# Patient Record
Sex: MALE | Race: WHITE | NOT HISPANIC OR LATINO | Employment: OTHER | URBAN - METROPOLITAN AREA
[De-identification: names, ages, dates, MRNs, and addresses within clinical notes are randomized per-mention and may not be internally consistent; named-entity substitution may affect disease eponyms.]

---

## 2024-01-21 DIAGNOSIS — G40.909 EPILEPSY, UNSPECIFIED, NOT INTRACTABLE, WITHOUT STATUS EPILEPTICUS (MULTI): ICD-10-CM

## 2024-01-22 NOTE — TELEPHONE ENCOUNTER
Pt's listed number not answered, VMB full.  I then called wife:    1) needs FU visit with Kathia for Keppra refill    2) was supposed to be seen by Kathia summer 2023 after seizures (inpatient) , pt had no insurance    3) Pt now has Medicare    4) I explained this to wife.  Note given to Cathy to schedule FU with wife for pt at [phone]

## 2024-01-24 ENCOUNTER — APPOINTMENT (OUTPATIENT)
Dept: NEUROLOGY | Facility: HOSPITAL | Age: 64
End: 2024-01-24

## 2024-01-24 RX ORDER — LEVETIRACETAM 500 MG/1
500 TABLET ORAL 2 TIMES DAILY
Qty: 60 TABLET | Refills: 2 | OUTPATIENT
Start: 2024-01-24

## 2024-02-12 ENCOUNTER — APPOINTMENT (OUTPATIENT)
Dept: NEUROLOGY | Facility: HOSPITAL | Age: 64
End: 2024-02-12

## 2024-02-12 PROBLEM — R40.4 EPISODE OF ALTERED CONSCIOUSNESS: Status: ACTIVE | Noted: 2024-02-12

## 2024-02-13 NOTE — PROGRESS NOTES
"SUBJECTIVE    Ellis Pollack is a 63 y.o. left-handed male who presents with   Chief Complaint   Patient presents with    Follow-up     Seizures-patient states he has not had one in a long time        Presents for follow up visit  Visit type: in-clinic visit    HISTORY OF PRESENT ILLNESS    RECAP:  Pt was admitted to [facility] from 7/6/23 -7/11/23 for episode of unresponsiveness. Seen inpatient by Dr. Cornell and myself.     PMH: none known     Presented for: per report patient was found in the neighbor’s yard unresponsive. EMS was contacted, attempted to give narcan however this was unsuccessful to improve his status. There was no reported seizure like activity at that time and patient was brought to the ED. Wife was at work and not present at time of event. However she did report that over the past several years he has been having episodes where when he wakes up he is talking with \"mixed up\" words and not making sense, and stumbling around and falling. States she has attempted to get him evaluated for this however patient has declined evaluation previously. She does not know of any prior seizures. Of note patient was seen here in the ED day prior for abdominal pain and found to have cholelithiasis without cholecystitis or biliary distention, and nonobstructing 9mm stone in left kidney without hydronephrosis. He was discharged with tramadol for pain control.      Testing showed:  HCT and CT c-spine without any acute findings  MRI brain wwo contrast - limited due to motion, no definitive acute findings  EEG with diffuse slowing consistent with moderate diffuse encephalopathy     Plan established including:  No driving until cleared by neurology.   Started on Keppra 500 mg BID    02/14/24 -     Pt was >20 min late for appt on 2/11/24 - rescheduled.     Presents alone for today's in-clinic visit. States no more episodes since discharge. Still taking 500 mg BID of Keppra. States did not continue the seroquel dosing. " "Does not think he needs it, states \" I dont want to quiet them. If they are there then keep them.\" Then states \"Cant understand sticking to another language to confuse everyone.\" Unable to elaborate on who the \"them\" is. ? Hearing voices.     ETOH occasionally. Not since \"a little\" at [facility] - states sips of a drink.     Pt states similar episode happened 4-5 years ago also. States it was in the summer and he was working outside helping other people out. Had period of loss of awareness. States he does not know what happened but his josé luis told him that he had a seizure then. Thinks this happened again (in July) due to working outside and being stressed out at home with family needing a lot of things. Had to fix sons car and do a lot of work outside for his wife. States \"I am always taking on other people's stress\"    Denies any issues with the Keppra. States no dizziness or drowsiness. Taking twice daily. Denies missing any doses.     REVIEW OF SYSTEMS:  10 point review of systems performed and is negative except as noted in the HPI.     History reviewed. No pertinent past medical history.    No relevant family history has been documented for this patient.    History reviewed. No pertinent surgical history.    Social History     Substance and Sexual Activity   Drug Use Never     Tobacco Use: Medium Risk (2/14/2024)    Patient History     Smoking Tobacco Use: Former     Smokeless Tobacco Use: Never     Passive Exposure: Not on file     Alcohol Use: Not on file       Current Outpatient Medications   Medication Instructions    levETIRAcetam (KEPPRA) 500 mg, oral, 2 times daily         OBJECTIVE    BP (!) 140/95   Pulse 89 Comment: Co 99%  Ht 1.702 m (5' 7\")   Wt 94.5 kg (208 lb 4.8 oz)   BMI 32.62 kg/m²       Physical Exam  Vitals reviewed.   Constitutional:       Appearance: He is not ill-appearing or toxic-appearing.   Neurological:      Mental Status: He is alert.      Cranial Nerves: No dysarthria.      " Motor: Motor strength is normal.  Psychiatric:         Attention and Perception: Attention and perception normal.         Mood and Affect: Affect is blunt.         Speech: Speech is tangential.      Comments: Disorganized thoughts  Flight of ideas  Jumping between multiple stories through discussion       Neurological Exam  Mental Status  Alert. Oriented to person, place and time. no dysarthria present.    Cranial Nerves  CN II-XII grossly intact.    Motor  Normal muscle bulk throughout. No fasciculations present. Normal muscle tone. No abnormal involuntary movements. Strength is 5/5 throughout all four extremities.    Sensory  Light touch is normal in upper and lower extremities.     Gait  Casual gait is normal including stance, stride, and arm swing.        MR brain w and wo IV contrast 07/08/2023    Narrative  Interpreted By:  JEANNINE BRANNON MD  MRN: [id number]  Patient Name: FELICIA MCLEAN    STUDY:  MRI BRAIN W/WO CONTRAST;  7/8/2023 5:01 pm    INDICATION:  New onset seizure .    COMPARISON:  CT head dated 07/06/2023.    ACCESSION NUMBER(S):  [id number]    ORDERING CLINICIAN:  MACHELLE SAEED    TECHNIQUE:  Multiplanar and multisequence MRI of the brain was performed before  and after intravenous administration of 14 mL of Dotarem intravenous  gadolinium contrast. Please note that the patient was unable to  tolerate the entire exam, and volumetric postcontrast T1 weighted  images were not obtained, only spin echo T1 weighted postcontrast  images present on current study.    FINDINGS:  Exam is somewhat degraded by motion.    Within limits of the study, no diffusion restriction abnormality is  present to suggest an acute infarct.    No definite evidence of intracranial hemorrhage is identified. There  is no mass effect or midline shift.    Several punctate foci of nonspecific FLAIR signal are present in the  subcortical white matter bilateral cerebral hemispheres, otherwise no  significant parenchymal signal  abnormality is identified. No  pathologic enhancement is identified on spin echo T1 weighted images.    No ventricular dilatation is present. Basal cisterns are patent. No  abnormal extra-axial fluid collections are identified.    Small amount of mucus/secretions are present in the inferior  maxillary sinus. Mastoid air cells are unremarkable in appearance.    Impression  1.  Limited examination due to motion and patient's inability to  tolerate the entire study, with volumetric T1 postcontrast images not  obtained.  2. Within limits of the study, no evidence of diffusion restriction  abnormality to suggest a new infarct, or other acute intracranial  abnormality.        Lab Results   Component Value Date    WBC 11.0 07/10/2023    RBC 4.95 07/10/2023    HGB 15.8 07/10/2023    HCT 45.0 07/10/2023     07/10/2023     07/10/2023    K 3.9 07/10/2023     07/10/2023    BUN 16 07/10/2023    CREATININE 0.73 07/10/2023    CALCIUM 9.0 07/10/2023    ALKPHOS 45 07/07/2023    AST 24 07/07/2023    ALT 13 07/07/2023    MG 1.81 07/09/2023    [id number] 237 07/11/2023    HGBA1C 5.4 11/22/2021    TSH 1.88 07/10/2023              ASSESSMENT & PLAN  Problem List Items Addressed This Visit       Episode of altered consciousness - Primary    Overview     Suspected seizure  Hospitalized July 2023, was newly on tramadol at the time which could have lowered seizure threshold. No witnesses, found unresponsive outside.   Difficult historian, flight of ideas  Tolerating Keppra well, 500 mg BID  Possible reported seizure episode 4-5 years ago also  Both episodes occurred with reported increased stress and working outside for long periods         Current Assessment & Plan     Continue Keppra 500 mg BID  Rx sent for 1 year         Relevant Medications    levETIRAcetam (Keppra) 500 mg tablet    Other Relevant Orders    Follow Up In Neurology    Seizure-like activity (CMS/HCC)    Relevant Orders    Follow Up In Neurology         FU  in 1 year

## 2024-02-14 ENCOUNTER — OFFICE VISIT (OUTPATIENT)
Dept: NEUROLOGY | Facility: HOSPITAL | Age: 64
End: 2024-02-14
Payer: MEDICARE

## 2024-02-14 VITALS
WEIGHT: 208.3 LBS | BODY MASS INDEX: 32.69 KG/M2 | SYSTOLIC BLOOD PRESSURE: 140 MMHG | HEIGHT: 67 IN | DIASTOLIC BLOOD PRESSURE: 95 MMHG | HEART RATE: 89 BPM

## 2024-02-14 DIAGNOSIS — R40.4 EPISODE OF ALTERED CONSCIOUSNESS: Primary | ICD-10-CM

## 2024-02-14 DIAGNOSIS — R56.9 SEIZURE-LIKE ACTIVITY (MULTI): ICD-10-CM

## 2024-02-14 PROCEDURE — 1036F TOBACCO NON-USER: CPT | Performed by: NURSE PRACTITIONER

## 2024-02-14 PROCEDURE — 1126F AMNT PAIN NOTED NONE PRSNT: CPT | Performed by: NURSE PRACTITIONER

## 2024-02-14 PROCEDURE — 1159F MED LIST DOCD IN RCRD: CPT | Performed by: NURSE PRACTITIONER

## 2024-02-14 PROCEDURE — 99213 OFFICE O/P EST LOW 20 MIN: CPT | Performed by: NURSE PRACTITIONER

## 2024-02-14 PROCEDURE — 1160F RVW MEDS BY RX/DR IN RCRD: CPT | Performed by: NURSE PRACTITIONER

## 2024-02-14 RX ORDER — LEVETIRACETAM 500 MG/1
500 TABLET ORAL 2 TIMES DAILY
Qty: 60 TABLET | Refills: 11 | Status: SHIPPED | OUTPATIENT
Start: 2024-02-14 | End: 2025-02-13

## 2024-02-14 ASSESSMENT — ENCOUNTER SYMPTOMS
OCCASIONAL FEELINGS OF UNSTEADINESS: 0
DEPRESSION: 1
LOSS OF SENSATION IN FEET: 0

## 2024-02-14 ASSESSMENT — PAIN SCALES - GENERAL: PAINLEVEL: 0-NO PAIN

## 2024-02-14 ASSESSMENT — PATIENT HEALTH QUESTIONNAIRE - PHQ9
1. LITTLE INTEREST OR PLEASURE IN DOING THINGS: SEVERAL DAYS
SUM OF ALL RESPONSES TO PHQ9 QUESTIONS 1 AND 2: 2
2. FEELING DOWN, DEPRESSED OR HOPELESS: SEVERAL DAYS
10. IF YOU CHECKED OFF ANY PROBLEMS, HOW DIFFICULT HAVE THESE PROBLEMS MADE IT FOR YOU TO DO YOUR WORK, TAKE CARE OF THINGS AT HOME, OR GET ALONG WITH OTHER PEOPLE: NOT DIFFICULT AT ALL

## 2024-02-14 NOTE — PATIENT INSTRUCTIONS
Seizures:  As we discussed, because you have not had any events or seizures where you lose awareness or control of your actions, you have no restrictions at this time. However, if you have an event of seizure where this were to occur, you must inform our office, and we will reassess things. In this event, you stop driving, using heavy machinery, climbing heights, or engaging in any other activity that would cause harm/death to yourself or others were you to lose awareness/consciousness and have a seizure. These restrictions would need to stay in place until at least 6 months of seizure freedom and clearance from your physician. Avoid possible triggers for seizure activity including alcohol or drug use, stress, not eating, and sleep deprivation.    Guidelines on when to treat a first seizure found that starting seizure medications after the first seizure can lessen the risk for more seizures in the first 2 years. The report also found:  When an adult has an unprovoked seizure, the risk for more seizures without treatment in the next 2 years can range 21 to 45%. This risk is greatest in people who have had a brain injury (such as stroke or trauma) and seizure activity is seen on the EEG. Other factors that may lead to a higher risk for seizures in the first 2 years are other abnormalities on brain imaging tests and seizures at night.  Starting a seizure medication right away may help lessen this early risk, but does not seem to affect the long-term risk of seizures. The risk of side effects from seizure medications may range from 7 to 31%. Medication side effects are usually mild and go away. Early treatment may not affect the person's quality of life over time.    Please be sure your family and friends know how to manage any future seizures you may have. This includes staying calm, turning you on your side and removing potential close hazards, cushion your head if able, remove any glasses, do NOT put anything in your  mouth, observe and monitor duration. It is normal to be tired and disoriented in your post-ictal (after seizure) phase. This can last several minutes up to hours. You may develop a headache during this time.     Call us to notify us if you have any seizure activity.   When to call 911: if you are injured during the seizure (including hitting your head), if the seizure lasts longer than 5-10 minutes, if your face/lips turn blue, if you do not start to wake up after about 30 mins following the seizure, or if it is not your typical seizure activity.     Additional helpful resources are available at:  [employer] - www.epilepsy.com  [facility] [place] [facility] - www.[facility].org

## 2024-02-22 ENCOUNTER — HOSPITAL ENCOUNTER (EMERGENCY)
Facility: HOSPITAL | Age: 64
Discharge: HOME | End: 2024-02-22
Attending: EMERGENCY MEDICINE
Payer: MEDICARE

## 2024-02-22 VITALS
WEIGHT: 142 LBS | DIASTOLIC BLOOD PRESSURE: 76 MMHG | HEART RATE: 66 BPM | BODY MASS INDEX: 22.29 KG/M2 | HEIGHT: 67 IN | OXYGEN SATURATION: 98 % | TEMPERATURE: 98.1 F | SYSTOLIC BLOOD PRESSURE: 124 MMHG | RESPIRATION RATE: 16 BRPM

## 2024-02-22 DIAGNOSIS — M79.5 FOREIGN BODY (FB) IN SOFT TISSUE: Primary | ICD-10-CM

## 2024-02-22 PROCEDURE — 90715 TDAP VACCINE 7 YRS/> IM: CPT | Performed by: EMERGENCY MEDICINE

## 2024-02-22 PROCEDURE — 99283 EMERGENCY DEPT VISIT LOW MDM: CPT | Mod: 25

## 2024-02-22 PROCEDURE — 10120 INC&RMVL FB SUBQ TISS SMPL: CPT | Performed by: EMERGENCY MEDICINE

## 2024-02-22 PROCEDURE — 2500000005 HC RX 250 GENERAL PHARMACY W/O HCPCS: Performed by: EMERGENCY MEDICINE

## 2024-02-22 PROCEDURE — 90471 IMMUNIZATION ADMIN: CPT | Performed by: EMERGENCY MEDICINE

## 2024-02-22 PROCEDURE — 2500000004 HC RX 250 GENERAL PHARMACY W/ HCPCS (ALT 636 FOR OP/ED): Performed by: EMERGENCY MEDICINE

## 2024-02-22 RX ORDER — LIDOCAINE HYDROCHLORIDE AND EPINEPHRINE 10; 10 MG/ML; UG/ML
5 INJECTION, SOLUTION INFILTRATION; PERINEURAL ONCE
Status: COMPLETED | OUTPATIENT
Start: 2024-02-22 | End: 2024-02-22

## 2024-02-22 RX ADMIN — TETANUS TOXOID, REDUCED DIPHTHERIA TOXOID AND ACELLULAR PERTUSSIS VACCINE, ADSORBED 0.5 ML: 5; 2.5; 8; 8; 2.5 SUSPENSION INTRAMUSCULAR at 18:18

## 2024-02-22 RX ADMIN — LIDOCAINE HYDROCHLORIDE,EPINEPHRINE BITARTRATE 5 ML: 10; .01 INJECTION, SOLUTION INFILTRATION; PERINEURAL at 18:23

## 2024-02-22 ASSESSMENT — PAIN - FUNCTIONAL ASSESSMENT: PAIN_FUNCTIONAL_ASSESSMENT: 0-10

## 2024-02-22 ASSESSMENT — PAIN SCALES - GENERAL
PAINLEVEL_OUTOF10: 0 - NO PAIN
PAINLEVEL_OUTOF10: 0 - NO PAIN

## 2024-02-22 NOTE — ED PROVIDER NOTES
HPI   Chief Complaint   Patient presents with    shaving of metal in abdomen        HPI     HISTORY OF PRESENT ILLNESS:  Patient is a 63-year-old male presents to the emergency department for foreign body.  Patient states that 2 months ago, he was changing the brakes on his car.  There was some flecks of metal that had went into his pants.  It hit his abdominal wall.  He noted today that there was a piece that he was trying to get out.  There is no pain or discharge.  Does not know when his last tetanus shot was.    Past Medical History: Seizures on Keppra, suspected dementia      __________________________________________________________  PHYSICAL EXAM:    Appearance: Alert, oriented , cooperative   Skin: Intact,  dry skin, no lesions, rash, petechiae or purpura.   Eyes: PERRLA, EOMs intact,  Conjunctiva pink with no redness or exudates.    HENT: Normocephalic, atraumatic. Nares patent   Neck: Supple. Trachea at midline.   Pulmonary: Lung sounds are clear bilaterally.  There is no rales, rhonchi, or wheezing.  Cardiac: Regular rate and rhythm, no rubs, murmurs, or gallops. No JVD,   Abdomen: Abdomen is soft, nontender, and nondistended.  No palpable organomegaly.  No rebound or guarding.  No CVA tenderness. Nonsurgical abdomen  Genitourinary: Exam deferred.  Musculoskeletal: no edema, pain, cyanosis, or deformity in extremities. Pulses full and equal.   Neurological:  Cranial nerves are grossly intact, grossly normal sensation, no weakness, no focal findings identified.    __________________________________________________________  MEDICAL DECISION MAKING:    Patient was seen and examined.  Patient had what appeared to be a foreign body in the lower abdominal region.  Patient believes that this could have been when he was replacing the brakes.  The area did not have any area of fluctuance.  I updated the patient's tetanus as he did not know when his last tetanus was.  Then removed the object after injecting the  patient with lidocaine.  This was removed with forceps.  Patient was given return precautions.  Patient was given supportive care instructions including Neosporin and return precautions for cellulitis.  Patient was discharged.        Chronic Medical Conditions Significantly Affecting Care:    External Records Reviewed: I reviewed recent and relevant outside records including: Patient discharge summary from July 11, 2023    Everette Garcia  Emergency Medicine               [place] Coma Scale Score: 15                     Patient History   No past medical history on file.  No past surgical history on file.  No family history on file.  Social History     Tobacco Use    Smoking status: Former     Types: Cigarettes    Smokeless tobacco: Never   Substance Use Topics    Alcohol use: Never    Drug use: Never       Physical Exam   ED Triage Vitals [02/22/24 1752]   Temperature Heart Rate Respirations BP   36.7 °C (98.1 °F) 66 16 124/76      Pulse Ox Temp Source Heart Rate Source Patient Position   98 % Temporal Monitor Sitting      BP Location FiO2 (%)     Left arm --       Physical Exam    ED Course & MDM   Diagnoses as of 02/22/24 1849   Foreign body (FB) in soft tissue       Medical Decision Making      Procedure  Foreign Body Removal - Embedded    Performed by: Everette Garcia DO  Authorized by: Everette Garcia DO    Consent:     Consent obtained:  Written and verbal    Consent given by:  Patient    Risks discussed:  Bleeding, infection, pain and worsening of condition    Alternatives discussed:  No treatment and observation  Universal protocol:     Patient identity confirmed:  Verbally with patient, arm band and provided demographic data  Location:     Location: lower abdominaln wall.    Depth:  Subcutaneous  Pre-procedure details:     Neurovascular status: intact    Anesthesia:     Anesthesia method:  Local infiltration    Local anesthetic:  Lidocaine 1% WITH epi  Procedure type:     Procedure complexity:   Simple  Procedure details:     Dissection of underlying tissues: no      Bloodless field: yes      Removal mechanism:  Forceps    Foreign bodies recovered:  1    Description:  Black, soft tissue, 2mm in lenth.  Post-procedure details:     Neurovascular status: intact      Confirmation:  No additional foreign bodies on visualization    Skin closure:  None    Dressing:  Non-adherent dressing    Procedure completion:  Tolerated       Everette Garcia DO  02/22/24 5539

## 2024-03-04 ENCOUNTER — APPOINTMENT (OUTPATIENT)
Dept: NEUROLOGY | Facility: HOSPITAL | Age: 64
End: 2024-03-04

## 2024-04-09 ENCOUNTER — APPOINTMENT (OUTPATIENT)
Dept: RADIOLOGY | Facility: HOSPITAL | Age: 64
End: 2024-04-09
Payer: MEDICARE

## 2024-04-09 ENCOUNTER — HOSPITAL ENCOUNTER (EMERGENCY)
Facility: HOSPITAL | Age: 64
Discharge: HOME | End: 2024-04-09
Payer: MEDICARE

## 2024-04-09 VITALS
BODY MASS INDEX: 21.19 KG/M2 | DIASTOLIC BLOOD PRESSURE: 68 MMHG | HEART RATE: 65 BPM | HEIGHT: 67 IN | OXYGEN SATURATION: 98 % | RESPIRATION RATE: 16 BRPM | TEMPERATURE: 98.5 F | WEIGHT: 135 LBS | SYSTOLIC BLOOD PRESSURE: 109 MMHG

## 2024-04-09 DIAGNOSIS — S30.851A FOREIGN BODY IN SKIN OF ABDOMEN: Primary | ICD-10-CM

## 2024-04-09 PROCEDURE — 99283 EMERGENCY DEPT VISIT LOW MDM: CPT

## 2024-04-09 PROCEDURE — 74018 RADEX ABDOMEN 1 VIEW: CPT

## 2024-04-09 PROCEDURE — 74018 RADEX ABDOMEN 1 VIEW: CPT | Performed by: RADIOLOGY

## 2024-04-09 PROCEDURE — 2500000001 HC RX 250 WO HCPCS SELF ADMINISTERED DRUGS (ALT 637 FOR MEDICARE OP)

## 2024-04-09 PROCEDURE — 2500000001 HC RX 250 WO HCPCS SELF ADMINISTERED DRUGS (ALT 637 FOR MEDICARE OP): Performed by: NURSE PRACTITIONER

## 2024-04-09 RX ORDER — BACITRACIN ZINC 500 UNIT/G
OINTMENT IN PACKET (EA) TOPICAL ONCE
Status: COMPLETED | OUTPATIENT
Start: 2024-04-09 | End: 2024-04-09

## 2024-04-09 RX ORDER — BACITRACIN ZINC 500 UNIT/G
OINTMENT IN PACKET (EA) TOPICAL
Status: COMPLETED
Start: 2024-04-09 | End: 2024-04-09

## 2024-04-09 RX ADMIN — BACITRACIN 1 APPLICATION: 500 OINTMENT TOPICAL at 14:39

## 2024-04-09 RX ADMIN — BACITRACIN 1 APPLICATION: 500 OINTMENT TOPICAL at 16:35

## 2024-04-09 ASSESSMENT — PAIN SCALES - GENERAL: PAINLEVEL_OUTOF10: 0 - NO PAIN

## 2024-04-09 ASSESSMENT — PAIN DESCRIPTION - LOCATION: LOCATION: ABDOMEN

## 2024-04-09 ASSESSMENT — PAIN DESCRIPTION - ORIENTATION: ORIENTATION: LOWER

## 2024-04-09 ASSESSMENT — LIFESTYLE VARIABLES
EVER HAD A DRINK FIRST THING IN THE MORNING TO STEADY YOUR NERVES TO GET RID OF A HANGOVER: NO
EVER FELT BAD OR GUILTY ABOUT YOUR DRINKING: NO
TOTAL SCORE: 0
HAVE PEOPLE ANNOYED YOU BY CRITICIZING YOUR DRINKING: NO
HAVE YOU EVER FELT YOU SHOULD CUT DOWN ON YOUR DRINKING: NO

## 2024-04-09 ASSESSMENT — PAIN - FUNCTIONAL ASSESSMENT: PAIN_FUNCTIONAL_ASSESSMENT: 0-10

## 2024-04-09 NOTE — DISCHARGE INSTRUCTIONS
WOUND CARE PATIENT INSTRUCTIONS:    GENERAL INFORMATION:   A wound is a break in the skin.  There are several types of wounds.  Abrasions occur when the outer layer of the skin is rubbed or scraped off.  Lacerations are cuts in the skin.  Puncture wounds are holes that are made by round, sharp objects such as needles or nails.  It may have been necessary to close the wound with stitches (sutures) to speed healing and to prevent infection.  Using stitches also will decrease the amount of scarring.    INSTRUCTIONS:   1.  Wash the area with soap and water twice daily, pat it dry.    2.  Apply antibiotic ointment to the area twice daily after cleansing.    3.  Cover with a Band-Aid or sterile dressing.      CONTACT YOUR DOCTOR OR GOTO THE EMERGENCY DEPARTMENT IF:  1. You have a temperature over 100.4 F (38 C).    2. You have signs of infection such as increasing pain or soreness, swelling, redness, pus, a foul smell, or red   streaks coming from the injured site.    3. You have numbness or swelling below the wound, or you can't move the joint below.     INFORMATION FROM UP TO DATE - ALL RIGHTS RESERVED

## 2024-04-09 NOTE — ED PROVIDER NOTES
"    HPI   Chief Complaint   Patient presents with    possible fb lower abdominal wall       Patient presents the emergency department for concern of a foreign body in his superficial skin of the left lower abdominal wall.  He states months ago he was working on brakes and felt that he got a hortencia of metal in his skin.  He was evaluated at the end of February and had a small piece of metal removed from the skin.  Since then, a few days ago he noticed suspicion of a metal foreign body again.  He has had no redness, swelling, abdominal pain, nausea, vomiting, fever, chills, myalgias or malaise.  He has been eating, drinking, defecating and urinating as per normal.  He reports his tetanus vaccine to be up-to-date.  He wants to see if there is still a piece of metal left over after he had it taken out.      History provided by:  Patient   used: No                          [place] Coma Scale Score: 15                  Patient History   No past medical history on file.  No past surgical history on file.  No family history on file.  Social History     Tobacco Use    Smoking status: Former     Types: Cigarettes    Smokeless tobacco: Never   Substance Use Topics    Alcohol use: Never    Drug use: Never       Physical Exam   Visit Vitals  /68 (BP Location: Left arm, Patient Position: Sitting)   Pulse 65   Temp 36.9 °C (98.5 °F) (Skin)   Resp 16   Ht 1.702 m (5' 7\")   Wt 61.2 kg (135 lb)   SpO2 98%   BMI 21.14 kg/m²   Smoking Status Former   BSA 1.7 m²      Physical Exam  Vitals and nursing note reviewed.   Constitutional:       General: He is not in acute distress.     Appearance: He is well-developed.   HENT:      Head: Normocephalic and atraumatic.      Mouth/Throat:      Lips: Pink.      Mouth: Mucous membranes are moist.   Eyes:      Conjunctiva/sclera: Conjunctivae normal.   Cardiovascular:      Rate and Rhythm: Normal rate and regular rhythm.      Pulses:           Radial pulses are 2+ on the " left side.   Pulmonary:      Effort: Pulmonary effort is normal.      Breath sounds: Normal breath sounds.   Abdominal:      General: Bowel sounds are normal.      Palpations: Abdomen is soft.      Tenderness: There is no abdominal tenderness.       Genitourinary:     Comments: deferred  Musculoskeletal:         General: No swelling.      Cervical back: Full passive range of motion without pain and neck supple.      Comments: PALACIOS katty   Skin:     General: Skin is warm and dry.      Capillary Refill: Capillary refill takes less than 2 seconds.      Findings: Lesion (suspicious for foreign body as documented under abdomem) present.   Neurological:      General: No focal deficit present.      Mental Status: He is alert and oriented to person, place, and time.   Psychiatric:         Mood and Affect: Mood normal.         Behavior: Behavior is cooperative.         XR abdomen 1 view   Final Result   No radiopaque foreign body is noted.        MACRO:   None        Signed by: Rajinder Luke 4/9/2024 3:08 PM   Dictation workstation:   [id number]          Labs Reviewed - No data to display      ED Course & [facility]   ED Course as of 04/09/24 1627   Tue Apr 09, 2024 1624 Patient continues to feel comfortable sitting in a recliner.  We discussed his x-ray result as well as skin care at home.  He states he has an antibiotic cream that he can use and declines offer for prescription. [NA]      ED Course User Index  [NA] Debbie Calles, APRN-CNP         Diagnoses as of 04/09/24 1627   Foreign body in skin of abdomen           Medical Decision Making  Patient returns to the emergency department for recheck of suspicion foreign body to the superficial skin of the abdominal wall.  He had a potential metal foreign body from doing brakes in April and believes one resurfaced or it did not completely come out.  It is quite superficial and patient did not require patient provides consent for removal without anesthesia.  Upon exam it is  difficult to ascertain if it is a piece of retained metal or a thick hair.  Will perform a KUB in which the technologist at the bedside was asked to perform lateral view to check for foreign body of the superficial skin.  Patient declines need for dose of Tylenol and he has no clinical evidence of acute abdomen or suspicion of sepsis as he is afebrile, no tachycardia, normotensive and with abdomen soft, nontender with active bowel sounds and no rigidity.     X-ray as interpreted by radiologist negative for radiopaque foreign body.  I reviewed the films as well and I do not see a foreign body in the area as well.    Plan is for skin care with daily shower with soap and water, over-the-counter topical antibiotic cream and outpatient follow-up with primary care for a wound recheck as needed. Patient is non-toxic, not hypoxic and appropriate for this outpatient management plan which they prefer. Written handout of discharge instructions provided. Patient was educated on signs of symptoms to watch for indicative of re-evaluation in the emergency department setting to include any worsening of current symptoms. They verbalized understanding of instructions and is amenable to this treatment plan with no social determinants of health that would obscure this plan. Patient departed in stable condition.              Your medication list        ASK your doctor about these medications        Instructions Last Dose Given Next Dose Due   levETIRAcetam 500 mg tablet  Commonly known as: Keppra      Take 1 tablet (500 mg) by mouth 2 times a day.                Procedure  Time: 1430    FOREIGN BODY REMOVAL SKIN    Indication: foreign body skin  Performed By: Debbie Calles CNP  Risks, benefits and alternatives for the applicable procedure described. Opportunity for questions and answers provided to allow for informed decision making.  Consent: Verbal consent was obtained by the patient    Location:   LLQ superficial skin    Prep:  The skin was cleansed with chlorhexidine gluconate  Local Anesthesia: None  Removal: The foreign body was retrieved with 18 g needle and forceps.  It was approximately 2 mm thin linear dark brown/black in color.  Unable to determine if it is consistent with metal or a thick hair.  Complications: There were no complications and patient tolerated the procedure well. Bacitracin band-aid applied.       *This report was transcribed using voice recognition software.  Every effort was made to ensure accuracy; however, inadvertent computerized transcription errors may be present.*  Debbie Calles, MELVIN-AUNDREA  04/09/24         Debbie Calles, MELVIN-AUNDREA  04/09/24 [id number]

## 2025-02-14 ENCOUNTER — APPOINTMENT (OUTPATIENT)
Dept: NEUROLOGY | Facility: HOSPITAL | Age: 65
End: 2025-02-14
Payer: MEDICARE

## 2025-03-06 ENCOUNTER — OFFICE VISIT (OUTPATIENT)
Dept: NEUROLOGY | Facility: HOSPITAL | Age: 65
End: 2025-03-06
Payer: MEDICARE

## 2025-03-06 VITALS
BODY MASS INDEX: 23.63 KG/M2 | DIASTOLIC BLOOD PRESSURE: 95 MMHG | WEIGHT: 150.9 LBS | SYSTOLIC BLOOD PRESSURE: 155 MMHG | HEART RATE: 95 BPM

## 2025-03-06 DIAGNOSIS — R56.9 SEIZURE-LIKE ACTIVITY (MULTI): ICD-10-CM

## 2025-03-06 DIAGNOSIS — R40.4 EPISODE OF ALTERED CONSCIOUSNESS: Primary | ICD-10-CM

## 2025-03-06 PROCEDURE — 1036F TOBACCO NON-USER: CPT | Performed by: NURSE PRACTITIONER

## 2025-03-06 PROCEDURE — 1160F RVW MEDS BY RX/DR IN RCRD: CPT | Performed by: NURSE PRACTITIONER

## 2025-03-06 PROCEDURE — 1159F MED LIST DOCD IN RCRD: CPT | Performed by: NURSE PRACTITIONER

## 2025-03-06 PROCEDURE — 99214 OFFICE O/P EST MOD 30 MIN: CPT | Performed by: NURSE PRACTITIONER

## 2025-03-06 PROCEDURE — 1126F AMNT PAIN NOTED NONE PRSNT: CPT | Performed by: NURSE PRACTITIONER

## 2025-03-06 RX ORDER — LEVETIRACETAM 500 MG/1
500 TABLET ORAL 2 TIMES DAILY
Qty: 60 TABLET | Refills: 11 | Status: SHIPPED | OUTPATIENT
Start: 2025-03-06 | End: 2026-03-06

## 2025-03-06 ASSESSMENT — ENCOUNTER SYMPTOMS
OCCASIONAL FEELINGS OF UNSTEADINESS: 0
LOSS OF SENSATION IN FEET: 0
DEPRESSION: 0

## 2025-03-06 ASSESSMENT — PAIN SCALES - GENERAL: PAINLEVEL_OUTOF10: 0-NO PAIN

## 2025-03-06 NOTE — PROGRESS NOTES
"[facility]    SUBJECTIVE    Ellis Pollack is a 64 y.o. left-handed male who presents with   Chief Complaint   Patient presents with    Seizures     Yearly visit        Presents for follow up visit  Visit type: in-clinic visit    HISTORY OF PRESENT ILLNESS    RECAP:  Initially seen as hospital follow up from admission 7/6/23 -7/11/23 for episode of unresponsiveness. PMH: none known. Per report patient was found in the neighbor’s yard unresponsive. EMS was contacted, attempted to give narcan however this was unsuccessful to improve his status. There was no reported seizure like activity at that time and patient was brought to the ED. Wife was at work and not present at time of event. However she did report that over the past several years he has been having episodes where when he wakes up he is talking with \"mixed up\" words and not making sense, and stumbling around and falling. States she has attempted to get him evaluated for this however patient has declined evaluation previously. She does not know of any prior seizures. Of note patient was seen here in the ED day prior for abdominal pain and found to have cholelithiasis without cholecystitis or biliary distention, and nonobstructing 9mm stone in left kidney without hydronephrosis. He was discharged with tramadol for pain control.      HCT and CT c-spine without any acute findings  MRI brain wwo contrast - limited due to motion, no definitive acute findings  EEG with diffuse slowing consistent with moderate diffuse encephalopathy    No driving until cleared by neurology. Started on Keppra 500 mg BID    Did not follow up until February 2024. Presents alone for today's in-clinic visit. States no more episodes since discharge. Still taking 500 mg BID of Keppra. States did not continue the seroquel dosing. Does not think he needs it, states \" I dont want to quiet them. If they are there then keep them.\" Then states \"Cant understand sticking " "to another language to confuse everyone.\" Unable to elaborate on who the \"them\" is. ? Hearing voices.      ETOH occasionally. Not since \"a little\" at [place] - states sips of a drink.      Pt states similar episode happened 4-5 years ago also. States it was in the summer and he was working outside helping other people out. Had period of loss of awareness. States he does not know what happened but his josé luis told him that he had a seizure then. Thinks this happened again (in July) due to working outside and being stressed out at home with family needing a lot of things. Had to fix sons car and do a lot of work outside for his wife. States \"I am always taking on other people's stress\"     Denies any issues with the Keppra. States no dizziness or drowsiness. Taking twice daily. Denies missing any doses.     03/06/25 - pt presented alone for today's visit.     Patient denies any auras, shaking, jerking, convulsions, loss of time, zoning out, waking up with tongue or cheek bites, incontinence or unexplained bruising.    States he has been not overexerting himself either. Previously was working all day and then going home and doing all of the house work. He states he set boundaries with his family.     REVIEW OF SYSTEMS:  10 point review of systems performed and is negative except as noted in the HPI.     History reviewed. No pertinent past medical history.    No relevant family history has been documented for this patient.    History reviewed. No pertinent surgical history.    Social History     Substance and Sexual Activity   Drug Use Never     Tobacco Use: Medium Risk (3/6/2025)    Patient History     Smoking Tobacco Use: Former     Smokeless Tobacco Use: Never     Passive Exposure: Not on file     Alcohol Use: Not on file       Current Outpatient Medications   Medication Instructions    levETIRAcetam (KEPPRA) 500 mg, oral, 2 times daily         OBJECTIVE    BP (!) 155/95   Pulse 95   Wt 68.4 kg (150 lb 14.4 oz)   " BMI 23.63 kg/m²       Physical Exam  Psychiatric:         Mood and Affect: Affect is flat.         Speech: Speech is rapid and pressured and tangential.       Neurological Exam  Mental Status  Awake, alert and oriented to person, place and time. Language is fluent with no aphasia. Attention and concentration are normal. Fund of knowledge is appropriate for level of education.    Cranial Nerves  CN II-XII grossly intact.    Motor  Normal muscle bulk throughout. No fasciculations present. Normal muscle tone. No abnormal involuntary movements.  Strength at least antigravity throughout    +decreased blink  No tremor.    Sensory  Light touch is normal in upper and lower extremities.     Coordination  Right: Finger-to-nose normal.Left: Finger-to-nose normal.    Gait  Casual gait: Reduced stride length. Shuffling gait.        MR brain w and wo IV contrast 07/08/2023    Narrative  Interpreted By:  JEANNINE BRANNON MD  MRN: [id number]  Patient Name: FELICIA MCLEAN    STUDY:  MRI BRAIN W/WO CONTRAST;  7/8/2023 5:01 pm    INDICATION:  New onset seizure .    COMPARISON:  CT head dated 07/06/2023.    ACCESSION NUMBER(S):  [id number]    ORDERING CLINICIAN:  MACHELLE SAEED    TECHNIQUE:  Multiplanar and multisequence MRI of the brain was performed before  and after intravenous administration of 14 mL of Dotarem intravenous  gadolinium contrast. Please note that the patient was unable to  tolerate the entire exam, and volumetric postcontrast T1 weighted  images were not obtained, only spin echo T1 weighted postcontrast  images present on current study.    FINDINGS:  Exam is somewhat degraded by motion.    Within limits of the study, no diffusion restriction abnormality is  present to suggest an acute infarct.    No definite evidence of intracranial hemorrhage is identified. There  is no mass effect or midline shift.    Several punctate foci of nonspecific FLAIR signal are present in the  subcortical white matter bilateral cerebral  hemispheres, otherwise no  significant parenchymal signal abnormality is identified. No  pathologic enhancement is identified on spin echo T1 weighted images.    No ventricular dilatation is present. Basal cisterns are patent. No  abnormal extra-axial fluid collections are identified.    Small amount of mucus/secretions are present in the inferior  maxillary sinus. Mastoid air cells are unremarkable in appearance.    Impression  1.  Limited examination due to motion and patient's inability to  tolerate the entire study, with volumetric T1 postcontrast images not  obtained.  2. Within limits of the study, no evidence of diffusion restriction  abnormality to suggest a new infarct, or other acute intracranial  abnormality.      Lab Results   Component Value Date    WBC 11.0 07/10/2023    RBC 4.95 07/10/2023    HGB 15.8 07/10/2023    HCT 45.0 07/10/2023     07/10/2023     07/10/2023    K 3.9 07/10/2023     07/10/2023    BUN 16 07/10/2023    CREATININE 0.73 07/10/2023    CALCIUM 9.0 07/10/2023    ALKPHOS 45 07/07/2023    AST 24 07/07/2023    ALT 13 07/07/2023    MG 1.81 07/09/2023    [id number] 237 07/11/2023    HGBA1C 5.4 11/22/2021    TSH 1.88 07/10/2023          ASSESSMENT & PLAN  Problem List Items Addressed This Visit       Episode of altered consciousness - Primary    Overview     Suspected seizure  Hospitalized July 2023, was newly on tramadol at the time which could have lowered seizure threshold. No witnesses, found unresponsive outside.   Difficult historian, flight of ideas  Tolerating Keppra well, 500 mg BID  Possible reported seizure episode 4-5 years ago also  Both episodes occurred with reported increased stress and working outside for long periods         Relevant Medications    levETIRAcetam (Keppra) 500 mg tablet    Other Relevant Orders    Follow Up In Neurology    Seizure-like activity (Multi)    Relevant Medications    levETIRAcetam (Keppra) 500 mg tablet    Other Relevant Orders     Follow Up In Neurology         FU in 1 year         Kathia Chan, APRN-CNP

## 2025-04-10 ENCOUNTER — PHARMACY VISIT (OUTPATIENT)
Dept: PHARMACY | Facility: CLINIC | Age: 65
End: 2025-04-10
Payer: MEDICARE

## 2025-04-10 ENCOUNTER — APPOINTMENT (OUTPATIENT)
Dept: URGENT CARE | Age: 65
End: 2025-04-10

## 2025-04-10 ENCOUNTER — HOSPITAL ENCOUNTER (EMERGENCY)
Facility: HOSPITAL | Age: 65
Discharge: HOME | End: 2025-04-10
Payer: COMMERCIAL

## 2025-04-10 ENCOUNTER — APPOINTMENT (OUTPATIENT)
Dept: RADIOLOGY | Facility: HOSPITAL | Age: 65
End: 2025-04-10
Payer: COMMERCIAL

## 2025-04-10 VITALS
RESPIRATION RATE: 16 BRPM | TEMPERATURE: 98.1 F | HEIGHT: 67 IN | DIASTOLIC BLOOD PRESSURE: 84 MMHG | SYSTOLIC BLOOD PRESSURE: 123 MMHG | BODY MASS INDEX: 23.54 KG/M2 | HEART RATE: 98 BPM | OXYGEN SATURATION: 95 % | WEIGHT: 150 LBS

## 2025-04-10 DIAGNOSIS — S61.421A LACERATION OF RIGHT HAND WITH FOREIGN BODY, INITIAL ENCOUNTER: Primary | ICD-10-CM

## 2025-04-10 PROCEDURE — 73130 X-RAY EXAM OF HAND: CPT | Mod: RT

## 2025-04-10 PROCEDURE — 2500000005 HC RX 250 GENERAL PHARMACY W/O HCPCS: Performed by: NURSE PRACTITIONER

## 2025-04-10 PROCEDURE — 73130 X-RAY EXAM OF HAND: CPT | Mod: RIGHT SIDE | Performed by: RADIOLOGY

## 2025-04-10 PROCEDURE — RXMED WILLOW AMBULATORY MEDICATION CHARGE

## 2025-04-10 PROCEDURE — 2500000001 HC RX 250 WO HCPCS SELF ADMINISTERED DRUGS (ALT 637 FOR MEDICARE OP): Performed by: NURSE PRACTITIONER

## 2025-04-10 PROCEDURE — 99283 EMERGENCY DEPT VISIT LOW MDM: CPT

## 2025-04-10 RX ORDER — CEPHALEXIN 250 MG/1
500 CAPSULE ORAL ONCE
Status: COMPLETED | OUTPATIENT
Start: 2025-04-10 | End: 2025-04-10

## 2025-04-10 RX ORDER — BACITRACIN ZINC 500 UNIT/G
OINTMENT IN PACKET (EA) TOPICAL ONCE
Status: COMPLETED | OUTPATIENT
Start: 2025-04-10 | End: 2025-04-10

## 2025-04-10 RX ORDER — ACETAMINOPHEN 325 MG/1
975 TABLET ORAL ONCE
Status: COMPLETED | OUTPATIENT
Start: 2025-04-10 | End: 2025-04-10

## 2025-04-10 RX ORDER — CEPHALEXIN 500 MG/1
500 CAPSULE ORAL 4 TIMES DAILY
Qty: 28 CAPSULE | Refills: 0 | Status: SHIPPED | OUTPATIENT
Start: 2025-04-10 | End: 2025-04-17

## 2025-04-10 RX ORDER — MUPIROCIN 20 MG/G
1 OINTMENT TOPICAL 2 TIMES DAILY
Qty: 22 G | Refills: 0 | Status: SHIPPED | OUTPATIENT
Start: 2025-04-10 | End: 2025-04-17

## 2025-04-10 RX ADMIN — BACITRACIN 1 APPLICATION: 500 OINTMENT TOPICAL at 16:28

## 2025-04-10 RX ADMIN — ACETAMINOPHEN 975 MG: 325 TABLET ORAL at 16:26

## 2025-04-10 RX ADMIN — CEPHALEXIN 500 MG: 250 CAPSULE ORAL at 17:21

## 2025-04-10 ASSESSMENT — PAIN SCALES - GENERAL
PAINLEVEL_OUTOF10: 0 - NO PAIN
PAINLEVEL_OUTOF10: 0 - NO PAIN

## 2025-04-10 ASSESSMENT — PAIN - FUNCTIONAL ASSESSMENT
PAIN_FUNCTIONAL_ASSESSMENT: 0-10
PAIN_FUNCTIONAL_ASSESSMENT: 0-10

## 2025-04-10 ASSESSMENT — COLUMBIA-SUICIDE SEVERITY RATING SCALE - C-SSRS
2. HAVE YOU ACTUALLY HAD ANY THOUGHTS OF KILLING YOURSELF?: NO
1. IN THE PAST MONTH, HAVE YOU WISHED YOU WERE DEAD OR WISHED YOU COULD GO TO SLEEP AND NOT WAKE UP?: NO
6. HAVE YOU EVER DONE ANYTHING, STARTED TO DO ANYTHING, OR PREPARED TO DO ANYTHING TO END YOUR LIFE?: NO

## 2025-04-10 NOTE — ED TRIAGE NOTES
Pt presented to the ED with complaints of laceration to right hand. Pt states he was working with metal when a pice cut his right hand. Pt shana pain in the hand at this time.

## 2025-04-10 NOTE — DISCHARGE INSTRUCTIONS
WOUND CARE PATIENT INSTRUCTIONS:    GENERAL INFORMATION:   A wound is a break in the skin.  There are several types of wounds.  Abrasions occur when the outer layer of the skin is rubbed or scraped off.  Lacerations are cuts in the skin.  Puncture wounds are holes that are made by round, sharp objects such as needles or nails.  It may have been necessary to close the wound with stitches (sutures) to speed healing and to prevent infection.  Using stitches also will decrease the amount of scarring.    INSTRUCTIONS:   IF WOUND CARE CALLS AND CAN SEE YOU TOMORROW, 4-11-25, LEAVE THE DRESSING ON UNTIL THEY SEE YOU.    1.  Wash the area with soap and water twice daily, pat it dry.    2.  Apply small smear of antibiotic ointment to the area twice daily after cleansing.    3.  Cover with a Band-Aid or sterile dressing.      CONTACT YOUR DOCTOR OR GOTO THE EMERGENCY DEPARTMENT IF:  1. You have a temperature over 100.4 F (38 C).    2. You have signs of infection such as increasing pain or soreness, swelling, redness, pus, a foul smell, or red   streaks coming from the injured site.    3. You have numbness or swelling below the wound, or you can't move the joint below.     INFORMATION FROM UP TO DATE - ALL RIGHTS RESERVED

## 2025-04-11 ASSESSMENT — VISUAL ACUITY: OU: 1

## 2025-04-11 NOTE — ED PROVIDER NOTES
"    HPI   Chief Complaint   Patient presents with    Hand Injury     Laceration to right hand       Patient presents the emergency department for evaluation of a right hand laceration that occurred with a piece of metal yesterday around 2 PM.  Patient got the bleeding to stop by applying tape to his hand.  He had concerned because his wound would gape open when he would open up his index and thumb so he taped his index and thumb to gather.  He denies being on any anticoagulation or being a diabetic.  He does not have any foreign body sensation to the area and he did not notice any limited range of motion of his thumb or fingers secondary to his injury.  He reports being left hand dominant.  He wondered today now if maybe he should have gotten sutures.  He has not taken any over-the-counter medication for discomfort as there is not much pain.  Otherwise he has no other complaints including no syncope, chest pain, shortness of breath, vomiting, abdominal pain, numbness or weakness.      History provided by:  Patient   used: No        INFORMED PHOTO CONSENT:    The patient has given verbal consent to have photos taken of right hand and inserted into their provider note as a part of their permanent medical record for purposes of documentation, treatment management, and/or medical review. All images taken were transmitted and stored on a secure Epic  Site located within a Media Folder Tab by a registered Epic-Haiku Mobile Application Device. See \"Media\" tab in Epic or photo as below.                    [place] Coma Scale Score: 15                  Patient History   History reviewed. No pertinent past medical history.  History reviewed. No pertinent surgical history.  No family history on file.  Social History     Tobacco Use    Smoking status: Former     Types: Cigarettes    Smokeless tobacco: Never   Substance Use Topics    Alcohol use: Never    Drug use: Never       Physical Exam   Visit " "Vitals  /84   Pulse 98   Temp 36.7 °C (98.1 °F)   Resp 16   Ht 1.702 m (5' 7\")   Wt 68 kg (150 lb)   SpO2 95%   BMI 23.49 kg/m²   Smoking Status Former   BSA 1.79 m²      Physical Exam  Vitals reviewed.   Constitutional:       General: He is not in acute distress.     Appearance: He is not ill-appearing.      Comments: Patient has type wrapped around his right hand as well as thumb and index finger upon my approach.  No saturation of blood.   HENT:      Head: Normocephalic and atraumatic.      Right Ear: Hearing normal.      Left Ear: Hearing normal.      Nose: Nose normal.      Mouth/Throat:      Lips: Pink.      Mouth: Mucous membranes are moist.   Eyes:      General: Vision grossly intact.   Cardiovascular:      Rate and Rhythm: Normal rate and regular rhythm.      Pulses:           Radial pulses are 2+ on the right side.   Pulmonary:      Effort: Pulmonary effort is normal.      Breath sounds: Normal breath sounds.   Musculoskeletal:      Cervical back: Normal range of motion and neck supple.      Comments: There is no bony tenderness to the right wrist, hand, thumb or fingers.  To the webspace between the right thumb and index finger is a 4 cm laceration as well as a 1 cm laceration and skin tear.  The skin is macerated.  There is no obvious foreign bodies.  There is dirt debris about the hands.  No evidence of tendon injury as patient is able to abduct, abduct, flex and extend the right thumb and index finger and is strong against resistance.  Strong radial and ulnar pulse.  Full sensation and motor movement along the radial, median and ulnar nerve distribution.   Skin:     General: Skin is warm and dry.      Capillary Refill: Capillary refill takes less than 2 seconds.      Findings: Wound (right hand as depicted below) present.   Neurological:      Mental Status: He is alert and oriented to person, place, and time.      Sensory: Sensation is intact.      Motor: Motor function is intact.      " Coordination: Coordination is intact.      Gait: Gait is intact.   Psychiatric:         Behavior: Behavior is cooperative.         XR hand right 3+ views   Final Result   1. Soft tissue laceration in the 1st webspace with a few subtle   punctate foci of metallic foreign body in the webspace.        Signed by: Miguel A Adams 4/10/2025 4:40 PM   Dictation workstation:   [id number]          Labs Reviewed - No data to display    No results found for this or any previous visit (from the past 4464 hours).    ED Course & MDM     Medical Decision Making  Patient presents for evaluation of a wound. Tetanus vaccination is up to date. Bleeding is controlled and patient is not on anticoagulation. There is clinical evidence warranting diagnostic imaging.  Plan is for irrigation of the wound, hand cleansing, x-ray of the hand and empiric antibiotic therapy with referral to wound care.    X-ray as interpreted by radiologist shows no fracture.  Punctate foreign bodies are superficial in the skin and not believed to be within the wound however the wound was irrigated well.    Given the appearance of the wound, it being open for more than 24 hours and his hands being dirty and the likelihood of increased risk for infection upon closing the wound that has been open for this period of time, shared decision making with patient for no formal closure of the wound with sutures.  Plan is for cephalexin 500 mg 4 times daily, wound care with soap and water, bacitracin, nonstick dressing and referral to wound care.  Ideally this would take place prior to the weekend however patient was given some supplies for wound care over the weekend and may return to the emergency department as needed for wound recheck until follow-up with wound care.  Patient is aware of S/S indicative of re-evaluation in the ER setting. Patient verbalizes understanding of instructions and departed in stable condition with no social determinants of health that would  obscure this treatment plan.         ED Course as of 04/11/25 0101   Thu Apr 10, 2025   1636 Wet read of the hand x-ray shows some punctate foreign bodies towards the second metacarpal not in the wound defect but likely superficial on the surface.  No obvious fractures in the area of the wound.  Awaiting formal read by radiologist. [NA]      ED Course User Index  [NA] MELVIN Woods-CNP         Diagnoses as of 04/11/25 0101   Laceration of right hand with foreign body, initial encounter          Your medication list        START taking these medications        Instructions Last Dose Given Next Dose Due   cephalexin 500 mg capsule  Commonly known as: Keflex      Take 1 capsule (500 mg) by mouth 4 times a day for 7 days.       mupirocin 2 % ointment  Commonly known as: Bactroban      Apply 1 Application topically 2 times a day for 7 days.              ASK your doctor about these medications        Instructions Last Dose Given Next Dose Due   levETIRAcetam 500 mg tablet  Commonly known as: Keppra      Take 1 tablet (500 mg) by mouth 2 times a day.                 Where to Get Your Medications        These medications were sent to [facility]  [address] [place] [id number]      Hours: 8AM to 6PM Mon-Fri, 8AM to 4PM Sat-Sun Phone: [phone]   cephalexin 500 mg capsule  mupirocin 2 % ointment         Procedure  TIME: 1655    WOUND CARE    Indication: hand laceration and skin tear  Performed By: Debbie Calles CNP  Risks, benefits and alternatives for the applicable procedure described. Opportunity for questions and answers provided to allow for informed decision making.  Consent: Verbal consent was obtained by the patient    LOCATION: hand hand  LENGTH: 4 cm and 1 cm  THICKNESS: Partial     Anesthesia was not required. The wound was cleaned with wound cleanser and irrigated with normal saline under pressure. The wound was explored and there are no foreign bodies. A  bacitracin Telfa pad,  large Band-Aid  were applied and secured with ace wrap. Patient was neurovascularly intact before and after the procedure. They tolerated the procedure well without complications. Wound care, dressing changes and return precautions discussed with understanding verbalized.        *This report was transcribed using voice recognition software.  Every effort was made to ensure accuracy; however, inadvertent computerized transcription errors may be present.*  Debbie Calles, MELVIN-AUNDREA  04/11/25         MELVIN Woods-AUNDREA  04/11/25 0101

## 2025-08-05 ENCOUNTER — HOSPITAL ENCOUNTER (EMERGENCY)
Facility: HOSPITAL | Age: 65
Discharge: PSYCHIATRIC OR SUBSTANCE USE FACILITY | End: 2025-08-05
Attending: EMERGENCY MEDICINE
Payer: MEDICARE

## 2025-08-05 ENCOUNTER — APPOINTMENT (OUTPATIENT)
Dept: CARDIOLOGY | Facility: HOSPITAL | Age: 65
End: 2025-08-05
Payer: MEDICARE

## 2025-08-05 ENCOUNTER — HOSPITAL ENCOUNTER (INPATIENT)
Facility: HOSPITAL | Age: 65
End: 2025-08-05
Attending: PSYCHIATRY & NEUROLOGY | Admitting: PSYCHIATRY & NEUROLOGY
Payer: MEDICARE

## 2025-08-05 VITALS
SYSTOLIC BLOOD PRESSURE: 148 MMHG | TEMPERATURE: 98 F | HEART RATE: 68 BPM | BODY MASS INDEX: 23.3 KG/M2 | WEIGHT: 145 LBS | DIASTOLIC BLOOD PRESSURE: 84 MMHG | OXYGEN SATURATION: 97 % | HEIGHT: 66 IN | RESPIRATION RATE: 18 BRPM

## 2025-08-05 DIAGNOSIS — F22 PARANOID DELUSION (MULTI): Primary | ICD-10-CM

## 2025-08-05 PROBLEM — F29 ATYPICAL PSYCHOSIS: Status: ACTIVE | Noted: 2025-08-05

## 2025-08-05 LAB
ALBUMIN SERPL BCP-MCNC: 3.9 G/DL (ref 3.4–5)
ALP SERPL-CCNC: 49 U/L (ref 33–136)
ALT SERPL W P-5'-P-CCNC: 10 U/L (ref 10–52)
AMPHETAMINES UR QL SCN: ABNORMAL
ANION GAP SERPL CALC-SCNC: 8 MMOL/L (ref 10–20)
APAP SERPL-MCNC: <10 UG/ML (ref ?–30)
APPEARANCE UR: CLEAR
AST SERPL W P-5'-P-CCNC: 12 U/L (ref 9–39)
BARBITURATES UR QL SCN: ABNORMAL
BASOPHILS # BLD AUTO: 0.07 X10*3/UL (ref 0–0.1)
BASOPHILS NFR BLD AUTO: 0.8 %
BENZODIAZ UR QL SCN: ABNORMAL
BILIRUB SERPL-MCNC: 0.6 MG/DL (ref 0–1.2)
BILIRUB UR STRIP.AUTO-MCNC: NEGATIVE MG/DL
BUN SERPL-MCNC: 17 MG/DL (ref 6–23)
BZE UR QL SCN: ABNORMAL
CALCIUM SERPL-MCNC: 9 MG/DL (ref 8.6–10.3)
CANNABINOIDS UR QL SCN: ABNORMAL
CHLORIDE SERPL-SCNC: 108 MMOL/L (ref 98–107)
CO2 SERPL-SCNC: 27 MMOL/L (ref 21–32)
COLOR UR: ABNORMAL
CREAT SERPL-MCNC: 0.91 MG/DL (ref 0.5–1.3)
EGFRCR SERPLBLD CKD-EPI 2021: >90 ML/MIN/1.73M*2
EOSINOPHIL # BLD AUTO: 0.14 X10*3/UL (ref 0–0.7)
EOSINOPHIL NFR BLD AUTO: 1.5 %
ERYTHROCYTE [DISTWIDTH] IN BLOOD BY AUTOMATED COUNT: 13.2 % (ref 11.5–14.5)
ETHANOL SERPL-MCNC: <10 MG/DL
FENTANYL+NORFENTANYL UR QL SCN: ABNORMAL
GLUCOSE SERPL-MCNC: 91 MG/DL (ref 74–99)
GLUCOSE UR STRIP.AUTO-MCNC: NORMAL MG/DL
HCT VFR BLD AUTO: 43.2 % (ref 41–52)
HGB BLD-MCNC: 14.8 G/DL (ref 13.5–17.5)
IMM GRANULOCYTES # BLD AUTO: 0.02 X10*3/UL (ref 0–0.7)
IMM GRANULOCYTES NFR BLD AUTO: 0.2 % (ref 0–0.9)
KETONES UR STRIP.AUTO-MCNC: NEGATIVE MG/DL
LEUKOCYTE ESTERASE UR QL STRIP.AUTO: ABNORMAL
LYMPHOCYTES # BLD AUTO: 2.11 X10*3/UL (ref 1.2–4.8)
LYMPHOCYTES NFR BLD AUTO: 22.6 %
MCH RBC QN AUTO: 32.1 PG (ref 26–34)
MCHC RBC AUTO-ENTMCNC: 34.3 G/DL (ref 32–36)
MCV RBC AUTO: 94 FL (ref 80–100)
METHADONE UR QL SCN: ABNORMAL
MONOCYTES # BLD AUTO: 1.04 X10*3/UL (ref 0.1–1)
MONOCYTES NFR BLD AUTO: 11.1 %
MUCOUS THREADS #/AREA URNS AUTO: NORMAL /LPF
NEUTROPHILS # BLD AUTO: 5.95 X10*3/UL (ref 1.2–7.7)
NEUTROPHILS NFR BLD AUTO: 63.8 %
NITRITE UR QL STRIP.AUTO: NEGATIVE
NRBC BLD-RTO: 0 /100 WBCS (ref 0–0)
OPIATES UR QL SCN: ABNORMAL
OXYCODONE+OXYMORPHONE UR QL SCN: ABNORMAL
PCP UR QL SCN: ABNORMAL
PH UR STRIP.AUTO: 5.5 [PH]
PLATELET # BLD AUTO: 232 X10*3/UL (ref 150–450)
POTASSIUM SERPL-SCNC: 3.9 MMOL/L (ref 3.5–5.3)
PROT SERPL-MCNC: 6 G/DL (ref 6.4–8.2)
PROT UR STRIP.AUTO-MCNC: NEGATIVE MG/DL
RBC # BLD AUTO: 4.61 X10*6/UL (ref 4.5–5.9)
RBC # UR STRIP.AUTO: NEGATIVE MG/DL
RBC #/AREA URNS AUTO: NORMAL /HPF
SALICYLATES SERPL-MCNC: <3 MG/DL (ref ?–20)
SARS-COV-2 RNA RESP QL NAA+PROBE: NOT DETECTED
SODIUM SERPL-SCNC: 139 MMOL/L (ref 136–145)
SP GR UR STRIP.AUTO: 1.02
UROBILINOGEN UR STRIP.AUTO-MCNC: NORMAL MG/DL
WBC # BLD AUTO: 9.3 X10*3/UL (ref 4.4–11.3)
WBC #/AREA URNS AUTO: NORMAL /HPF

## 2025-08-05 PROCEDURE — 85025 COMPLETE CBC W/AUTO DIFF WBC: CPT | Performed by: EMERGENCY MEDICINE

## 2025-08-05 PROCEDURE — 93005 ELECTROCARDIOGRAM TRACING: CPT

## 2025-08-05 PROCEDURE — 80307 DRUG TEST PRSMV CHEM ANLYZR: CPT | Performed by: EMERGENCY MEDICINE

## 2025-08-05 PROCEDURE — 80320 DRUG SCREEN QUANTALCOHOLS: CPT | Performed by: EMERGENCY MEDICINE

## 2025-08-05 PROCEDURE — 87086 URINE CULTURE/COLONY COUNT: CPT | Mod: PORLAB | Performed by: EMERGENCY MEDICINE

## 2025-08-05 PROCEDURE — 80053 COMPREHEN METABOLIC PANEL: CPT | Performed by: EMERGENCY MEDICINE

## 2025-08-05 PROCEDURE — 87635 SARS-COV-2 COVID-19 AMP PRB: CPT | Performed by: EMERGENCY MEDICINE

## 2025-08-05 PROCEDURE — 99285 EMERGENCY DEPT VISIT HI MDM: CPT | Performed by: EMERGENCY MEDICINE

## 2025-08-05 PROCEDURE — 81001 URINALYSIS AUTO W/SCOPE: CPT | Mod: 59 | Performed by: EMERGENCY MEDICINE

## 2025-08-05 PROCEDURE — 36415 COLL VENOUS BLD VENIPUNCTURE: CPT | Performed by: EMERGENCY MEDICINE

## 2025-08-05 SDOH — HEALTH STABILITY: PHYSICAL HEALTH
ON AVERAGE, HOW MANY DAYS PER WEEK DO YOU ENGAGE IN MODERATE TO STRENUOUS EXERCISE (LIKE A BRISK WALK)?: PATIENT DECLINED

## 2025-08-05 SDOH — SOCIAL STABILITY: SOCIAL INSECURITY: HAVE YOU HAD THOUGHTS OF HARMING ANYONE ELSE?: NO

## 2025-08-05 SDOH — SOCIAL STABILITY: SOCIAL NETWORK: HOW OFTEN DO YOU ATTEND MEETINGS OF THE CLUBS OR ORGANIZATIONS YOU BELONG TO?: NEVER

## 2025-08-05 SDOH — HEALTH STABILITY: MENTAL HEALTH: SUICIDAL BEHAVIOR (LIFETIME): NO

## 2025-08-05 SDOH — HEALTH STABILITY: MENTAL HEALTH: EXPERIENCED ANY OF THE FOLLOWING LIFE EVENTS: OTHER (COMMENT)

## 2025-08-05 SDOH — ECONOMIC STABILITY: FOOD INSECURITY: WITHIN THE PAST 12 MONTHS, YOU WORRIED THAT YOUR FOOD WOULD RUN OUT BEFORE YOU GOT THE MONEY TO BUY MORE.: NEVER TRUE

## 2025-08-05 SDOH — HEALTH STABILITY: MENTAL HEALTH: HAVE YOU EVER DONE ANYTHING, STARTED TO DO ANYTHING, OR PREPARED TO DO ANYTHING TO END YOUR LIFE?: NO

## 2025-08-05 SDOH — HEALTH STABILITY: PHYSICAL HEALTH
HOW OFTEN DO YOU NEED TO HAVE SOMEONE HELP YOU WHEN YOU READ INSTRUCTIONS, PAMPHLETS, OR OTHER WRITTEN MATERIAL FROM YOUR DOCTOR OR PHARMACY?: NEVER

## 2025-08-05 SDOH — HEALTH STABILITY: MENTAL HEALTH: BEHAVIORAL HEALTH(WDL): EXCEPTIONS TO WDL

## 2025-08-05 SDOH — ECONOMIC STABILITY: INCOME INSECURITY: IN THE PAST 12 MONTHS HAS THE ELECTRIC, GAS, OIL, OR WATER COMPANY THREATENED TO SHUT OFF SERVICES IN YOUR HOME?: NO

## 2025-08-05 SDOH — SOCIAL STABILITY: SOCIAL INSECURITY: WITHIN THE LAST YEAR, HAVE YOU BEEN HUMILIATED OR EMOTIONALLY ABUSED IN OTHER WAYS BY YOUR PARTNER OR EX-PARTNER?: NO

## 2025-08-05 SDOH — ECONOMIC STABILITY: HOUSING INSECURITY: IN THE PAST 12 MONTHS, HOW MANY TIMES HAVE YOU MOVED WHERE YOU WERE LIVING?: 1

## 2025-08-05 SDOH — HEALTH STABILITY: MENTAL HEALTH
DO YOU FEEL STRESS - TENSE, RESTLESS, NERVOUS, OR ANXIOUS, OR UNABLE TO SLEEP AT NIGHT BECAUSE YOUR MIND IS TROUBLED ALL THE TIME - THESE DAYS?: TO SOME EXTENT

## 2025-08-05 SDOH — HEALTH STABILITY: MENTAL HEALTH: NON-SPECIFIC ACTIVE SUICIDAL THOUGHTS (PAST 1 MONTH): NO

## 2025-08-05 SDOH — SOCIAL STABILITY: SOCIAL INSECURITY: ARE YOU MARRIED, WIDOWED, DIVORCED, SEPARATED, NEVER MARRIED, OR LIVING WITH A PARTNER?: MARRIED

## 2025-08-05 SDOH — HEALTH STABILITY: MENTAL HEALTH: BEHAVIORS/MOOD: AGITATED;FLAT AFFECT;FLIGHT OF IDEAS;PARANOID

## 2025-08-05 SDOH — HEALTH STABILITY: MENTAL HEALTH: HAVE YOU ACTUALLY HAD ANY THOUGHTS OF KILLING YOURSELF?: NO

## 2025-08-05 SDOH — SOCIAL STABILITY: SOCIAL NETWORK: HOW OFTEN DO YOU GET TOGETHER WITH FRIENDS OR RELATIVES?: ONCE A WEEK

## 2025-08-05 SDOH — HEALTH STABILITY: MENTAL HEALTH: DELUSIONS: PARANOID

## 2025-08-05 SDOH — HEALTH STABILITY: MENTAL HEALTH: WISH TO BE DEAD (PAST 1 MONTH): NO

## 2025-08-05 SDOH — ECONOMIC STABILITY: FOOD INSECURITY: WITHIN THE PAST 12 MONTHS, THE FOOD YOU BOUGHT JUST DIDN'T LAST AND YOU DIDN'T HAVE MONEY TO GET MORE.: NEVER TRUE

## 2025-08-05 SDOH — ECONOMIC STABILITY: FOOD INSECURITY: HOW HARD IS IT FOR YOU TO PAY FOR THE VERY BASICS LIKE FOOD, HOUSING, MEDICAL CARE, AND HEATING?: NOT VERY HARD

## 2025-08-05 SDOH — ECONOMIC STABILITY: HOUSING INSECURITY: AT ANY TIME IN THE PAST 12 MONTHS, WERE YOU HOMELESS OR LIVING IN A SHELTER (INCLUDING NOW)?: NO

## 2025-08-05 SDOH — HEALTH STABILITY: MENTAL HEALTH: BEHAVIORS/MOOD: SLEEPING

## 2025-08-05 SDOH — SOCIAL STABILITY: SOCIAL INSECURITY
WITHIN THE LAST YEAR, HAVE YOU BEEN KICKED, HIT, SLAPPED, OR OTHERWISE PHYSICALLY HURT BY YOUR PARTNER OR EX-PARTNER?: NO

## 2025-08-05 SDOH — SOCIAL STABILITY: SOCIAL INSECURITY: ARE YOU OR HAVE YOU BEEN THREATENED OR ABUSED PHYSICALLY, EMOTIONALLY, OR SEXUALLY BY ANYONE?: NO

## 2025-08-05 SDOH — HEALTH STABILITY: MENTAL HEALTH: FOR HIGH RISK PATIENTS: ALL INTERVENTIONS ABOVE, PLUS:;1:1 PATIENT OBSERVER AT ALL TIMES

## 2025-08-05 SDOH — SOCIAL STABILITY: SOCIAL INSECURITY: WERE YOU ABLE TO COMPLETE ALL THE BEHAVIORAL HEALTH SCREENINGS?: YES

## 2025-08-05 SDOH — HEALTH STABILITY: MENTAL HEALTH: SUICIDE ASSESSMENT: ADULT (C-SSRS)

## 2025-08-05 SDOH — HEALTH STABILITY: MENTAL HEALTH: ANXIETY SYMPTOMS: GENERALIZED

## 2025-08-05 SDOH — SOCIAL STABILITY: SOCIAL INSECURITY
WITHIN THE LAST YEAR, HAVE YOU BEEN RAPED OR FORCED TO HAVE ANY KIND OF SEXUAL ACTIVITY BY YOUR PARTNER OR EX-PARTNER?: NO

## 2025-08-05 SDOH — ECONOMIC STABILITY: HOUSING INSECURITY: IN THE LAST 12 MONTHS, WAS THERE A TIME WHEN YOU WERE NOT ABLE TO PAY THE MORTGAGE OR RENT ON TIME?: NO

## 2025-08-05 SDOH — HEALTH STABILITY: MENTAL HEALTH: CONTENT: BLAMING OTHERS

## 2025-08-05 SDOH — SOCIAL STABILITY: SOCIAL NETWORK: IN A TYPICAL WEEK, HOW MANY TIMES DO YOU TALK ON THE PHONE WITH FAMILY, FRIENDS, OR NEIGHBORS?: ONCE A WEEK

## 2025-08-05 SDOH — SOCIAL STABILITY: SOCIAL NETWORK
DO YOU BELONG TO ANY CLUBS OR ORGANIZATIONS SUCH AS CHURCH GROUPS, UNIONS, FRATERNAL OR ATHLETIC GROUPS, OR SCHOOL GROUPS?: NO

## 2025-08-05 SDOH — HEALTH STABILITY: MENTAL HEALTH: IN THE PAST FEW WEEKS, HAVE YOU FELT THAT YOU OR YOUR FAMILY WOULD BE BETTER OFF IF YOU WERE DEAD?: NO

## 2025-08-05 SDOH — SOCIAL STABILITY: SOCIAL INSECURITY: ABUSE: ADULT

## 2025-08-05 SDOH — SOCIAL STABILITY: SOCIAL INSECURITY: POSSIBLE ABUSE REPORTED TO:: OTHER (COMMENT)

## 2025-08-05 SDOH — HEALTH STABILITY: MENTAL HEALTH: IN THE PAST WEEK, HAVE YOU BEEN HAVING THOUGHTS ABOUT KILLING YOURSELF?: NO

## 2025-08-05 SDOH — HEALTH STABILITY: MENTAL HEALTH: HAVE YOU WISHED YOU WERE DEAD OR WISHED YOU COULD GO TO SLEEP AND NOT WAKE UP?: NO

## 2025-08-05 SDOH — SOCIAL STABILITY: SOCIAL INSECURITY: WITHIN THE LAST YEAR, HAVE YOU BEEN AFRAID OF YOUR PARTNER OR EX-PARTNER?: NO

## 2025-08-05 SDOH — SOCIAL STABILITY: SOCIAL NETWORK: HOW OFTEN DO YOU ATTEND CHURCH OR RELIGIOUS SERVICES?: 1 TO 4 TIMES PER YEAR

## 2025-08-05 SDOH — ECONOMIC STABILITY: TRANSPORTATION INSECURITY: IN THE PAST 12 MONTHS, HAS LACK OF TRANSPORTATION KEPT YOU FROM MEDICAL APPOINTMENTS OR FROM GETTING MEDICATIONS?: NO

## 2025-08-05 SDOH — SOCIAL STABILITY: SOCIAL INSECURITY: HAVE YOU HAD ANY THOUGHTS OF HARMING ANYONE ELSE?: NO

## 2025-08-05 SDOH — HEALTH STABILITY: MENTAL HEALTH: HAVE YOU EVER TRIED TO KILL YOURSELF?: NO

## 2025-08-05 SDOH — HEALTH STABILITY: MENTAL HEALTH: IN THE PAST FEW WEEKS, HAVE YOU WISHED YOU WERE DEAD?: NO

## 2025-08-05 SDOH — SOCIAL STABILITY: SOCIAL INSECURITY: DOES ANYONE TRY TO KEEP YOU FROM HAVING/CONTACTING OTHER FRIENDS OR DOING THINGS OUTSIDE YOUR HOME?: NO

## 2025-08-05 SDOH — ECONOMIC STABILITY: HOUSING INSECURITY: FEELS SAFE LIVING IN HOME: OTHER (COMMENT)

## 2025-08-05 SDOH — SOCIAL STABILITY: SOCIAL INSECURITY: HAS ANYONE EVER THREATENED TO HURT YOUR FAMILY OR YOUR PETS?: NO

## 2025-08-05 SDOH — HEALTH STABILITY: MENTAL HEALTH: ARE YOU HAVING THOUGHTS OF KILLING YOURSELF RIGHT NOW?: NO

## 2025-08-05 SDOH — HEALTH STABILITY: MENTAL HEALTH: DEPRESSION SYMPTOMS: NO PROBLEMS REPORTED OR OBSERVED.

## 2025-08-05 SDOH — SOCIAL STABILITY: SOCIAL INSECURITY: ARE THERE ANY APPARENT SIGNS OF INJURIES/BEHAVIORS THAT COULD BE RELATED TO ABUSE/NEGLECT?: NO

## 2025-08-05 SDOH — SOCIAL STABILITY: SOCIAL INSECURITY: DO YOU FEEL ANYONE HAS EXPLOITED OR TAKEN ADVANTAGE OF YOU FINANCIALLY OR OF YOUR PERSONAL PROPERTY?: NO

## 2025-08-05 SDOH — SOCIAL STABILITY: SOCIAL INSECURITY: DO YOU FEEL UNSAFE GOING BACK TO THE PLACE WHERE YOU ARE LIVING?: NO

## 2025-08-05 ASSESSMENT — LIFESTYLE VARIABLES
SUBSTANCE_ABUSE_PAST_12_MONTHS: YES
PRESCIPTION_ABUSE_PAST_12_MONTHS: NO
NAUSEA AND VOMITING: NO NAUSEA AND NO VOMITING
AGITATION: NORMAL ACTIVITY
SUBSTANCE_ABUSE_PAST_12_MONTHS: YES
AUDITORY DISTURBANCES: NOT PRESENT
SKIP TO QUESTIONS 9-10: 1
PAROXYSMAL SWEATS: NO SWEAT VISIBLE
VISUAL DISTURBANCES: NOT PRESENT
TREMOR: NO TREMOR
PRESCIPTION_ABUSE_PAST_12_MONTHS: YES
EVER FELT BAD OR GUILTY ABOUT YOUR DRINKING: NO
ORIENTATION AND CLOUDING OF SENSORIUM: ORIENTED AND CAN DO SERIAL ADDITIONS
TOTAL SCORE: 0
ANXIETY: MILDLY ANXIOUS
HAVE PEOPLE ANNOYED YOU BY CRITICIZING YOUR DRINKING: NO
CIWA TOTAL SCORE: 1
HOW OFTEN DO YOU HAVE 6 OR MORE DRINKS ON ONE OCCASION: NEVER
EVER HAD A DRINK FIRST THING IN THE MORNING TO STEADY YOUR NERVES TO GET RID OF A HANGOVER: NO
TOTAL_SCORE: 0
HAVE YOU EVER FELT YOU SHOULD CUT DOWN ON YOUR DRINKING: NO
HEADACHE, FULLNESS IN HEAD: NOT PRESENT
AUDIT-C TOTAL SCORE: 0
HOW MANY STANDARD DRINKS CONTAINING ALCOHOL DO YOU HAVE ON A TYPICAL DAY: PATIENT DOES NOT DRINK
HOW OFTEN DO YOU HAVE A DRINK CONTAINING ALCOHOL: NEVER
AUDIT-C TOTAL SCORE: 0

## 2025-08-05 ASSESSMENT — PAIN SCALES - GENERAL
PAINLEVEL_OUTOF10: 0 - NO PAIN

## 2025-08-05 ASSESSMENT — ACTIVITIES OF DAILY LIVING (ADL)
HEARING - RIGHT EAR: FUNCTIONAL
WALKS IN HOME: INDEPENDENT
PATIENT'S MEMORY ADEQUATE TO SAFELY COMPLETE DAILY ACTIVITIES?: NO
FEEDING YOURSELF: INDEPENDENT
HEARING - LEFT EAR: FUNCTIONAL
ASSISTIVE_DEVICE: DENTURES UPPER;EYEGLASSES
BATHING: INDEPENDENT
JUDGMENT_ADEQUATE_SAFELY_COMPLETE_DAILY_ACTIVITIES: NO
ADEQUATE_TO_COMPLETE_ADL: YES
GROOMING: INDEPENDENT
LACK_OF_TRANSPORTATION: NO
TOILETING: INDEPENDENT
DRESSING YOURSELF: INDEPENDENT

## 2025-08-05 ASSESSMENT — PATIENT HEALTH QUESTIONNAIRE - PHQ9
SUM OF ALL RESPONSES TO PHQ9 QUESTIONS 1 & 2: 2
2. FEELING DOWN, DEPRESSED OR HOPELESS: SEVERAL DAYS
1. LITTLE INTEREST OR PLEASURE IN DOING THINGS: SEVERAL DAYS

## 2025-08-05 ASSESSMENT — PAIN - FUNCTIONAL ASSESSMENT
PAIN_FUNCTIONAL_ASSESSMENT: 0-10
PAIN_FUNCTIONAL_ASSESSMENT: 0-10

## 2025-08-05 NOTE — PROGRESS NOTES
"Emergency Medicine Transition of Care Note.    I received Ellis Pollack in signout from Dr. Cruz.  Please see the previous ED provider note for all HPI, PE and MDM up to the time of signout at 0700. This is in addition to the primary record.    In brief Ellis Pollack is an 64 y.o. male presenting for   Chief Complaint   Patient presents with    Psychiatric Evaluation     At the time of signout we were awaiting: Psych eval    ED Course as of 08/05/25 1318   Tue Aug 05, 2025   0857 I spoke with the patient's wife who corroborated the story that the patient called 911.  She states that he has \"mental issues\" and that he feels that everybody including the government is out to get him.  She says that he is becoming more agitated, combative, and she is concerned about his mental wellbeing.  I discussed with her that we will be having our emergency psychiatric assessment team evaluating the patient here in the emergency department.    At this present time the patient is medically cleared [NS]      ED Course User Index  [NS] Oli Lind MD         Diagnoses as of 08/05/25 1318   Paranoid delusion (Multi)       Medical Decision Making  Patient was signed out to me from the outgoing team pending EPAT consultation.  I did consult EPAT after I got collaboration from the wife.  Patient is medically cleared.  EPAT recommended inpatient psychiatric stabilization and treatment.  Patient was accepted to [facility].  Hilliard slip and EMTALA form were filled out.  The patient will be transferred for definitive management.    Final diagnoses:   [F22] Paranoid delusion (Multi)           Procedure  Procedures    Oli Lind MD   "
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
## 2025-08-05 NOTE — ED PROCEDURE NOTE
Procedure  Suture Removal    Performed by: Oli Lind MD  Authorized by: Oli Lind MD    Consent:     Consent obtained:  Verbal    Consent given by:  Patient    Risks discussed:  Pain and wound separation    Alternatives discussed:  Delayed treatment  Universal protocol:     Patient identity confirmed:  Verbally with patient  Location:     Location:  Head/neck    Head/neck location:  Scalp  Procedure details:     Wound appearance:  No signs of infection, good wound healing, nontender and clean    Number of sutures removed:  2  Post-procedure details:     Post-removal:  No dressing applied    Procedure completion:  Tolerated well, no immediate complications               Oli Lind MD  08/05/25 4532

## 2025-08-05 NOTE — SIGNIFICANT EVENT
Application for Emergency Admission      Ready for Transfer?  Is the patient medically cleared for transfer to inpatient psychiatry: Yes  Has the patient been accepted to an inpatient psychiatric hospital: Yes    Application for Emergency Admission  IN ACCORDANCE WITH [place]122[place]  The Chief Clinical Officer of: GREG Willett 8/5/2025 .1:16 PM    Reason for Hospitalization  The undersigned has reason to believe that: Ellis Pollack Is a mentally ill person subject to hospitalization by court order under division B Section 5122.01 of the Revised Code, i.e., this person:    1.No  Represents a substantial risk of physical harm to self as manifested by evidence of threats of, or attempts at, suicide or serious self-inflicted bodily harm    2.No Represents a substantial risk of physical harm to others as manifested by evidence of recent homicidal or other violent behavior, evidence of recent threats that place another in reasonable fear of violent behavior and serious physical harm, or other evidence of present dangerousness    3.Yes Represents a substantial and immediate risk of serious physical impairment or injury to self as manifested by  evidence that the person is unable to provide for and is not providing for the person's basic physical needs because of the person's mental illness and that appropriate provision for those needs cannot be made  immediately available in the community    4.Yes Would benefit from treatment in a hospital for his mental illness and is in need of such treatment as manifested by evidence of behavior that creates a grave and imminent risk to substantial rights of others or  himself.    5.No Would benefit from treatment as manifested by evidence of behavior that indicates all of the following:       (a) The person is unlikely to survive safely in the community without supervision, based on a clinical determination.       (b) The person has a history of lack of compliance with  treatment for mental illness and one of the following applies:      (i) At least twice within the thirty-six months prior to the filing of an affidavit seeking court-ordered treatment of the person under section 5122.111 of the Revised Code, the lack of compliance has been a significant factor in necessitating hospitalization in a hospital or receipt of services in a forensic or other mental health unit of a correctional facility, provided that the thirty-six-month period shall be extended by the length of any hospitalization or incarceration of the person that occurred within the thirty-six-month period.      (ii) Within the forty-eight months prior to the filing of an affidavit seeking court-ordered treatment of the person under section 5122.111 of the Revised Code, the lack of compliance resulted in one or more acts of serious violent behavior toward self or others or threats of, or attempts at, serious physical harm to self or others, provided that the forty-eight-month period shall be extended by the length of any hospitalization or incarceration of the person that occurred within the forty-eight-month period.      (c) The person, as a result of mental illness, is unlikely to voluntarily participate in necessary treatment.       (d) In view of the person's treatment history and current behavior, the person is in need of treatment in order to prevent a relapse or deterioration that would be likely to result in substantial risk of serious harm to the person or others.    (e) Represents a substantial risk of physical harm to self or others if allowed to remain at liberty pending examination.    Therefore, it is requested that said person be admitted to the above named facility.    STATEMENT OF BELIEF    Must be filled out by one of the following: a psychiatrist, licensed physician, licensed clinical psychologist, health or ,  or .  (Statement shall include the circumstances under  which the individual was taken into custody and the reason for the person's belief that hospitalization is necessary. The statement shall also include a reference to efforts made to secure the individual's property at his residence if he was taken into custody there. Every reasonable and appropriate effort should be made to take this person into custody in the least conspicuous manner possible.)    Patient presented to the emergency department pink slipped by the 's department for paranoid delusions.  No homicidal or suicidal ideation, but collateral information obtained from the wife demonstrates that the patient has been having increased paranoid thoughts that are affecting his ADLs and overall life.  He has been evaluated by the emergency psychiatric assessment team and deemed appropriate for inpatient psychiatric stabilization and treatment.    Oli Lind MD 8/5/2025     _____________________________________________________________   Place of Employment: [facility]    STATEMENT OF OBSERVATION BY PSYCHIATRIST, LICENSED PHYSICIAN, OR LICENSED CLINICAL PSYCHOLOGIST, IF APPLICABLE    Place of Observation (e.g., On license of [facility], general hospital, office, emergency facility)  (If applicable, please complete)    Oli Lind MD 8/5/2025    _____________________________________________________________
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
## 2025-08-06 PROBLEM — G40.909 SEIZURE DISORDER (MULTI): Status: ACTIVE | Noted: 2025-08-06

## 2025-08-06 LAB
BACTERIA UR CULT: NORMAL
HOLD SPECIMEN: NORMAL

## 2025-08-06 SDOH — HEALTH STABILITY: MENTAL HEALTH: PREVIOUS MENTAL HEALTH TREATMENT: COUNSELING

## 2025-08-06 SDOH — HEALTH STABILITY: MENTAL HEALTH: MENTAL HEALTH TREATMENT: COUNSELING

## 2025-08-06 SDOH — HEALTH STABILITY: MENTAL HEALTH: MENTAL HEALTH CONDITIONS/ SYMPTOMS: DELUSIONAL;FLIGHT OF IDEAS;THOUGHT PROCESS, ALTERED

## 2025-08-06 SDOH — HEALTH STABILITY: MENTAL HEALTH: EMOTIONAL/ PSCYHOLOGICAL COMMENTS: PT DECLINES MEDICATIONS

## 2025-08-06 SDOH — HEALTH STABILITY: MENTAL HEALTH: RECENT CHANGES IN MENTAL STATUS/COGNITIVE FUNCTIONING: UNABLE TO ASSESS

## 2025-08-06 SDOH — HEALTH STABILITY: MENTAL HEALTH: BEHAVIOR: CONFUSED

## 2025-08-06 SDOH — SOCIAL STABILITY: SOCIAL INSECURITY

## 2025-08-06 SDOH — HEALTH STABILITY: MENTAL HEALTH: DEPRESSION SYMPTOMS: ISOLATIVE;SLEEP DISTURBANCE

## 2025-08-06 SDOH — HEALTH STABILITY: MENTAL HEALTH

## 2025-08-06 SDOH — HEALTH STABILITY: MENTAL HEALTH: ANXIETY SYMPTOMS: GENERALIZED

## 2025-08-06 SDOH — SOCIAL STABILITY: SOCIAL INSECURITY: RELIGIOUS/CULTURAL FACTORS: NONE REPORTED

## 2025-08-06 SDOH — HEALTH STABILITY: MENTAL HEALTH: SUBSTANCE USE: UNABLE TO ASSESS

## 2025-08-06 SDOH — ECONOMIC STABILITY: HOUSING INSECURITY

## 2025-08-06 ASSESSMENT — COLUMBIA-SUICIDE SEVERITY RATING SCALE - C-SSRS
6. HAVE YOU EVER DONE ANYTHING, STARTED TO DO ANYTHING, OR PREPARED TO DO ANYTHING TO END YOUR LIFE?: NO
1. SINCE LAST CONTACT, HAVE YOU WISHED YOU WERE DEAD OR WISHED YOU COULD GO TO SLEEP AND NOT WAKE UP?: NO

## 2025-08-06 ASSESSMENT — PAIN DESCRIPTION - DESCRIPTORS: DESCRIPTORS: PATIENT UNABLE TO DESCRIBE

## 2025-08-06 ASSESSMENT — PAIN SCALES - GENERAL
PAINLEVEL_OUTOF10: 0 - NO PAIN

## 2025-08-06 ASSESSMENT — ACTIVITIES OF DAILY LIVING (ADL)
BATHING_ASSISTANCE: INDEPENDENT
EFFECT OF PAIN ON DAILY ACTIVITIES: MINIMAL
ADL_ASSISTANCE: INDEPENDENT
ADL_ASSISTANCE: INDEPENDENT

## 2025-08-06 ASSESSMENT — COGNITIVE AND FUNCTIONAL STATUS - GENERAL
MOBILITY SCORE: 24
DAILY ACTIVITIY SCORE: 24

## 2025-08-06 ASSESSMENT — PAIN - FUNCTIONAL ASSESSMENT
PAIN_FUNCTIONAL_ASSESSMENT: 0-10

## 2025-08-06 NOTE — CONSULTS
"Nutrition Assessement Note    Nutrition Assessment    Reason for Assessment: Admission nursing screening    Reason for Hospital Admission:  Ellis Pollack is a 64 y.o. male who is admitted for atypical psychosis.     Malnutrition Screening Tool (MST)  Have you recently lost weight without trying?: Unsure  If yes, how much weight have you lost?: Unsure  Weight Loss Score: 2  Have you been eating poorly because of a decreased appetite?: No  Malnutrition Score: 2  Nutrition Screen  Stage 3 or 4 Pressure Injury or Multiple Non-Healing Wounds: No  Home Tube Feeding or Total Parenteral Nutrition (TPN): No  Dietitian Consult Needed: No    Medical History[1]   Surgical History[2]    Nutrition History:  Energy Intake: Good > 75 %    Anthropometrics:  Ht: 170.2 cm (5' 7\"), Wt: 61.2 kg (135 lb), BMI: 21.14    Weight Change:  Daily Weight  08/05/25 : 61.2 kg (135 lb)  08/05/25 : 65.8 kg (145 lb)  04/10/25 : 68 kg (150 lb)  03/06/25 : 68.4 kg (150 lb 14.4 oz)  04/09/24 : 61.2 kg (135 lb)  02/22/24 : 64.4 kg (142 lb)  02/14/24 : 94.5 kg (208 lb 4.8 oz) - question accuracy    Weight History / % Weight Change: per wt hx, pt with a 15# (10%) wt loss over ~5 months (3/6-8/5)  Significant Weight Loss: Yes    Nutrition Focused Physical Exam Findings: defer: not appropriate    Nutrition Significant Labs:  Lab Results   Component Value Date    WBC 9.3 08/05/2025    HGB 14.8 08/05/2025    HCT 43.2 08/05/2025     08/05/2025    ALT 10 08/05/2025    AST 12 08/05/2025     08/05/2025    K 3.9 08/05/2025     (H) 08/05/2025    CREATININE 0.91 08/05/2025    BUN 17 08/05/2025    CO2 27 08/05/2025    TSH 1.88 07/10/2023    HGBA1C 5.4 11/22/2021     Nutrition Specific Medications:  Scheduled Medications[3]  Continuous Medications[4]    Dietary Orders (From admission, onward)       Start     Ordered    08/05/25 2034  Adult diet Cardiac; 70 gm fat; 2 - 3 grams Sodium  Diet effective now        Question Answer Comment   Diet type " Cardiac    Fat restriction: 70 gm fat    Sodium restriction: 2 - 3 grams Sodium        08/05/25 2037    Unscheduled  Oral nutritional supplements  Until discontinued        Question Answer Comment   Deliver with Dinner    Select supplement: Sugar Free Mighty Shake        08/06/25 1011                  Estimated Needs:   Estimated Energy Needs  Total Energy Estimated Needs in 24 hours (kCal): 1841 kCal  Energy Estimated Needs per kg Body Weight in 24 hours (kCal/kg): 30 kCal/kg  Method for Estimating Needs: actual wt    Estimated Protein Needs  Total Protein Estimated Needs in 24 Hours (g): 74 g  Protein Estimated Needs per kg Body Weight in 24 Hours (g/kg): 1.2 g/kg  Method for Estimating 24 Hour Protein Needs: actual wt    Estimated Fluid Needs  Method for Estimating 24 Hour Fluid Needs: 1 ml/kcal or per MD       Nutrition Diagnosis   Nutrition Diagnosis:  Malnutrition Diagnosis  Patient has Malnutrition Diagnosis: No    Nutrition Diagnosis  Patient has Nutrition Diagnosis: Yes  Diagnosis Status (1): New  Nutrition Diagnosis 1: Unintended weight loss  Related to (1): decreased ability to consume/tolerate sufficient energy  As Evidenced by (1): 15# (10%) wt loss over ~5 months       Nutrition Interventions/Recommendations   Nutrition Interventions and Recommendations:  Nutrition Prescription: Nutrition prescription for oral nutrition    Nutrition Recommendations:  Individualized Nutrition Prescription Provided for : continue cardiac diet with mighty shake once daily to supplement po intake.    Nutrition Interventions/Goals:   Food and/or Nutrient Delivery Interventions  Interventions: Meals and snacks, Medical food supplement  Meals and Snacks: Mineral-modified diet, Fat-modified diet  Goal: provide as ordered  Medical Food Supplement: Commercial beverage medical food supplement therapy  Goal: mighty shake once daily to provide 200 kcals and 7g protein    Education Documentation  No documentation found.               Nutrition Monitoring and Evaluation   Monitoring/Evaluation:   Food/Nutrient Related History Monitoring  Monitoring and Evaluation Plan: Estimated Energy Intake  Estimated Energy Intake: Energy intake greater or equal to 75% of estimated energy needs    Anthropometric Measurements  Monitoring and Evaluation Plan: Body weight  Body Weight: Body weight - Maintain stable weight    Goal Status: New goal(s) identified    Follow Up  Time Spent (min): 30 minutes  Last Date of Nutrition Visit: 08/06/25  Nutrition Follow-Up Needed?: 5-7 days  Follow up Comment: 8/13/25          [1] History reviewed. No pertinent past medical history.  [2] History reviewed. No pertinent surgical history.  [3] calcium carbonate, 1,250 mg of calcium carbonate, oral, Daily  levETIRAcetam, 500 mg, oral, BID     [4]

## 2025-08-06 NOTE — CARE PLAN
The patient's goals for the shift include go home    The clinical goals for the shift include maintain safety    Over the shift, the patient did not make progress toward the following goals. Barriers to progression include lack of insight into reason for being here. Recommendations to address these barriers include reorientation as needed.      Problem: Safety - Adult  Goal: Free from fall injury  Outcome: Progressing
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
## 2025-08-06 NOTE — NURSING NOTE
ALLY NOTE     Problem:  Pt. Is continuing their journey on working to improve their anxiety & depressive Sx.     Behavior:  Pt. Is cooperative w/ Tx. Plan and has been agreeable to talking w/ staff & peers alike. Pt. Has been able to maintain safety.   Appetite/Meals: good        Interventions:  Pt. Was offered groups and their scheduled medications.     Response:  Pt. Has been compliant w/ meds, tolerated them well; and did not attend the group sessions.     Plan:  Pt. will continue to be monitored for changes in mental status & safety on the unit.

## 2025-08-06 NOTE — PROGRESS NOTES
"Occupational Therapy Evaluation       08/06/25 [id number]   OT Last Visit   OT Received On 08/06/25   General   Reason for Referral Impaired ADLs d/t atypical psychosis   Referred By Dr. Dhillon   Past Medical History Relevant to Rehab epsiodes of altered consciousness   Family/Caregiver Present No   Co-Treatment PT   Co-Treatment Reason to maximize safety with mobility and outcome   Prior to Session Communication Bedside nurse   Patient Position Received Up in chair;Alarm off, caregiver present   Preferred Learning Style verbal;visual   General Comment Cleared by nursing and agreeable for therapy   Precautions   Hearing/Visual Limitations +corrective lenses   Medical Precautions Fall precautions;Seizure precautions   Precautions Comment assault and suicide precautions   Pain Assessment   Pain Assessment 0-10   0-10 (Numeric) Pain Score 0 - No pain   Cognition   Overall Cognitive Status Impaired   Following Commands   (Follows one step commands with redirection/cueing d/t impulsivity)   Safety Judgment Decreased awareness of need for safety precautions   Cognition Comments Pt has been aggressive and verbally abusive toward staff since admission, primarily in the evenings per nursing   Insight Moderate   Impulsive Moderately   Home Living   Type of Home House   Lives With Spouse  (and 19 yo son)   Home Adaptive Equipment Cane   Home Layout One level   Home Access Stairs to enter with rails   Entrance Stairs-Rails Both   Entrance Stairs-Number of Steps 3   Bathroom Shower/Tub Tub/shower unit   Bathroom Toilet Standard   Bathroom Equipment None   Home Living Comments Lives with spouse and son who both work   Prior Function Per Pt/Caregiver Report   Level of [place] Independent with ADLs and functional transfers;Independent with homemaking with ambulation   Receives Help From Family  (Shares IADLs with spouse:  \"I do the outside, she does the inside\")   ADL Assistance Independent   Homemaking Assistance Independent "   Ambulatory Assistance Independent   Prior Function Comments +drives   ADL   Eating Assistance Independent   Grooming Assistance Independent   Bathing Assistance Independent   UE Dressing Assistance Independent   LE Dressing Assistance Independent   LE Dressing Deficit   (able to reach feet to adjust socks)   Toileting Assistance with Device Independent   ADL Comments all per clinical judgement   Activity Tolerance   Endurance Endurance does not limit participation in activity   Functional Mobility   Functional Mobility Performed   (Independent without an A.D. in bedroom, bathroom, and on unit ~50'.)   Transfers   Transfer Yes   Transfer 1   Transfer From 1 Chair with arms to   Transfer to 1 Stand   Technique 1 Sit to stand   Transfer Level of Assistance 1 Independent   Transfers 2   Transfer From 2 Toilet to   Transfer to 2 Stand   Technique 2 Sit to stand;Stand to sit   Transfer Level of Assistance 2 Independent   Sensation   Light Touch No apparent deficits   Strength   Strength Comments BUEs=>3+/5   Hand Function   Gross Grasp Functional   Coordination Functional   IP OT Assessment   OT Assessment 63 yo male admits with paranoid delusions.  Pt currently is at baseline functional status with ADLs, transfers and mobility.  No skilled OT needs identified at this time.  Will sign off.   Barriers to Discharge Home Cognition needs   Cognition Needs 24hr supervision for safety awareness needed;Insight of patient limited regarding functional ability/needs   Evaluation/Treatment Tolerance Patient tolerated treatment well   Medical Staff Made Aware Yes   End of Session Communication Bedside nurse   End of Session Patient Position Up in chair;Alarm off, caregiver present   OT Assessment   OT Assessment Results Decreased cognition;Decreased safe judgment during ADL   Strengths Premorbid level of function   Barriers to Participation   (Cognitive/psychosocial deficits)   Inpatient/Swing Bed or Outpatient   Inpatient/Swing  Bed or Outpatient Inpatient   Inpatient Plan   No Skilled OT No acute OT goals identified   OT Frequency OT eval only   OT Recommended Transfer Status Stand by assist

## 2025-08-06 NOTE — GROUP NOTE
Group Topic: Goals   Group Date: 8/5/2025  Start Time: 2010  End Time: 2025  Facilitators: Meseret Yang   Department: [facility] Geriatric     Number of Participants: 9   Group Focus: check in  Treatment Modality: Interpersonal Therapy  Interventions utilized were reminiscence and story telling  Purpose: feelings    Name: Ellis Pollack YOB: 1960   MR: [id number]      Facilitator: Mental Health [facility]  Level of Participation: did not attend  Quality of Participation: distractible, distracting to others, intrusive, oppositional, pushed limits, removed from group, and restless  Interactions with others: minimal  Mood/Affect: agitated, angry, blunted, incongruent, and irritable  Triggers (if applicable): none  Cognition: confused, not focused, and pressured speech  Progress: Minimal  Comments: patient presented with Atypical psychosis  Plan: continue with services

## 2025-08-06 NOTE — GROUP NOTE
Group Topic: Goals   Group Date: 8/6/2025  Start Time: 0730  End Time: 0800  Facilitators: Zaira Spring LPN   Department: [facility]    Number of Participants: 11   Group Focus: goals  Treatment Modality: Interpersonal Therapy  Interventions utilized were support  Purpose: feelings    Name: Ellis Pollack YOB: 1960   MR: [id number]      Facilitator: Mental Health PCNA  Level of Participation: minimal  Quality of Participation: distractible  Interactions with others: minimal  Mood/Affect: agitated, angry, blunted, incongruent, and irritable  Triggers (if applicable): none  Cognition: confused, not focused, and pressured speech  Progress: Minimal  Comments: patient presented with Atypical psychosis  Plan: continue with services

## 2025-08-06 NOTE — PROGRESS NOTES
Physical Therapy    Physical Therapy Evaluation    Patient Name: Ellis Pollack  MRN: [id number]  Department: [facility]  Room: 402/402-A  Today's Date: 8/6/2025   Time Calculation  Start Time: 1031  Stop Time: 1047  Time Calculation (min): 16 min    Assessment/Plan   PT Assessment  PT Assessment Results:  (none)  Medical Staff Made Aware: Yes  Strengths: Premorbid level of function  Barriers to Participation: Comorbidities  End of Session Communication: Bedside nurse  Assessment Comment: 64 year old male who presents with cognition deficits, however, no physical deficits noted. Patient is independent for all mobility and has not suffered a decline in function. No PT needed at this time. Discharge PT>  End of Session Patient Position: Up in room (with Psychologist)  IP OR SWING BED PT PLAN  Inpatient or Swing Bed: Inpatient  PT Plan  PT Plan: PT Eval only  PT Eval Only Reason: At baseline function  PT Recommended Transfer Status: Independent  PT - OK to Discharge: Yes    Subjective     PT Visit Info:  PT Received On: 08/06/25  General Visit Information:  General  Reason for Referral: Impaired mobility, Atypical psychosis  Referred By: Dr. Dhillon  Past Medical History Relevant to Rehab: none per medical chart  Patient Position Received: Up in chair (in group room)  General Comment: 64 yr old male to ED brought in on a pink slip by the 's department due to concern for paranoid delusions  Home Living:  Home Living  Type of Home: House  Lives With: Spouse (20 yr old son)  Home Adaptive Equipment: Cane  Home Layout: One level  Home Access: Stairs to enter with rails  Entrance Stairs-Rails: Both  Entrance Stairs-Number of Steps: 3  Bathroom Shower/Tub: Tub/shower unit  Bathroom Toilet: Standard  Bathroom Equipment: None  Prior Level of Function:  Prior Function Per Pt/Caregiver Report  Level of [place]: Independent with ADLs and functional transfers, Independent with homemaking with  ambulation  Receives Help From: Family (shares with wife)  ADL Assistance: Independent  Homemaking Assistance: Independent  Ambulatory Assistance: Independent  Prior Function Comments: + drives  Precautions:  Precautions  Hearing/Visual Limitations: glasses, age related hearing changes, no aides  Medical Precautions: Fall precautions             Objective   Pain:  Pain Assessment  Pain Assessment: 0-10  0-10 (Numeric) Pain Score: 0 - No pain  Cognition:  Cognition  Overall Cognitive Status: Impaired  Orientation Level: Disoriented to situation, Disoriented to place  Insight: Mild  Impulsive: Mildly    General Assessments:                  Activity Tolerance  Endurance: Endurance does not limit participation in activity    Sensation  Light Touch: No apparent deficits  Sensation Comment: denies paresthesias B UE and B LE    Strength  Strength Comments: B LE 4+/5 hip, knee,and ankle  Coordination  Movements are Fluid and Coordinated: Yes  Rapid Alternating Movements: Intact  Alternating Toe Taps: Intact    Postural Control  Postural Control: Within Functional Limits    Static Sitting Balance  Static Sitting-Balance Support: Feet supported  Static Sitting-Level of Assistance: Independent  Dynamic Sitting Balance  Dynamic Sitting-Balance Support: Feet supported  Dynamic Sitting-Level of Assistance: Independent    Static Standing Balance  Static Standing-Balance Support: No upper extremity supported  Static Standing-Level of Assistance: Independent  Dynamic Standing Balance  Dynamic Standing-Balance Support: No upper extremity supported  Dynamic Standing-Level of Assistance: Independent  Dynamic Standing-Balance: Turning  Functional Assessments:  Bed Mobility  Bed Mobility: No    Transfers  Transfer: Yes  Transfer 1  Transfer From 1: Chair with arms to  Transfer to 1: Stand  Technique 1: Sit to stand, Stand to sit  Transfer Device 1:  (none)  Transfer Level of Assistance 1: Independent  Trials/Comments 1: pushes chair  back from table and stands supporting self on table, initial loss of balance to right but self corrects. Stand to sit to chair without arms Independent, safe technique noted.    Ambulation/Gait Training  Ambulation/Gait Training Performed: Yes  Ambulation/Gait Training 1  Surface 1: Level tile  Device 1: No device  Assistance 1: Independent  Quality of Gait 1: Narrow base of support  Comments/Distance (ft) 1: 100 ft, 30 ft no device, turns 360 degrees no loss of balance, bends over to  pen from floor with no loss of balance Independent     Object From Floor  Devices: No device  Assist Level: Independent  Comments: pen from floor  Extremity/Trunk Assessments: B LE WFL     Outcome Measures:  [facility] Basic Mobility  Turning from your back to your side while in a flat bed without using bedrails: None  Moving from lying on your back to sitting on the side of a flat bed without using bedrails: None  Moving to and from bed to chair (including a wheelchair): None  Standing up from a chair using your arms (e.g. wheelchair or bedside chair): None  To walk in hospital room: None  Climbing 3-5 steps with railing: None  Basic Mobility - Total Score: 24        Education Documentation  Mobility Training, taught by Marta Lantigua PT at 8/6/2025 11:07 AM.  Learner: Patient  Readiness: Acceptance  Method: Explanation  Response: Verbalizes Understanding  Comment: Education provided re: PT purpose, POC, safe mobility techniques

## 2025-08-06 NOTE — GROUP NOTE
Group Topic: Self-Care/Wellness   Group Date: 8/6/2025  Start Time: 1000  End Time: 1045  Facilitators: Olimpia De Santiago CTRS   Department: [facility] REHABTH VIRTUAL    Number of Participants: 9   Group Focus: other Self Care Tips  Treatment Modality: Other: Recreation Therapy  Interventions utilized were exploration, group exercise, patient education, problem solving, and support  Purpose: coping skills, maladaptive thinking, feelings, irrational fears, communication skills, insight or knowledge, self-worth, self-care, relapse prevention strategies, and trigger / craving management    Name: Ellis Pollack YOB: 1960   MR: [id number]      Facilitator: Recreational Therapist  Level of Participation: minimal  Quality of Participation: distractible, distracting to others, impulsive, intrusive, and restless  Interactions with others: intrusive, monopolizing, and sarcastic  Mood/Affect: angry, flat, and irritable  Triggers (if applicable): n/a  Cognition: grandiose, no insight, and processing slowly  Progress: Minimal  Comments: pt problem is delusional thought.  Pt joins group with little encouragement  Displays intrusive and somewhat disruptive behaviors.  Talk during group but not at CTRS or peers, mumbles under his breath.  Comments/statements made are paranoid and not related to group questions.  Pt engages about being trained to be a criminal.  Pt exits group to have a PT treatment.   Plan: continue with services
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
## 2025-08-06 NOTE — GROUP NOTE
Group Topic: Reminiscence   Group Date: 8/6/2025  Start Time: 1330  End Time: 1430  Facilitators: DENVER Mcghee   Department: [facility] REHABTH VIRTUAL    Number of Participants: 8   Group Focus: other Let's Talk  Treatment Modality: Other: Recreation Therapy  Interventions utilized were exploration, group exercise, reminiscence, and story telling  Purpose: other: fun, elevate mood, increase socialization, enhance self esteem    Name: Ellis Pollack YOB: 1960   MR: [id number]      Facilitator: Recreational Therapist  Level of Participation: minimal  Quality of Participation: distractible, distracting to others, and impulsive  Interactions with others: intrusive and monopolizing  Mood/Affect: irritable and restless  Triggers (if applicable): n/a  Cognition: confused and illogical  Progress: Minimal  Comments: pt problem is delusional.  Pt is late to group after initially refusing to attend.  Upon entering group, he is muttering to himself which is disruptive to group.   Making comments inappropriate and not related to group.  Unable to focus and appears paranoia and angry.  Redirection ineffective and pt exits group right after redirection is offered.  Very limited responses and very limited insight offered.  Plan: continue with services

## 2025-08-06 NOTE — CARE PLAN
The patient's goals for the shift include go home    The clinical goals for the shift include maintain safety    Over the shift, the patient did make progress toward the following goals.

## 2025-08-06 NOTE — CONSULTS
Consults    Reason For Consult  Medical management at [facility]    History Of Present Illness  Ellis Pollack is a 64 y.o. male presenting with paranoid delusions.  Patient was admitted to the ER with paranoid delusions and was evaluated and medically cleared for admission to [facility].  Patient's evaluation done in the ER reviewed..  Patient's urine tox is positive for cannabinoids.  Liver and kidney functions are normal no anemia.  CBC is normal.  Patient does have seizure disorder and is on Keppra.     Past Medical History  Seizure disoder  cholelithisasis    Surgical History  He has no past surgical history on file.     Social History  He reports that he has quit smoking. His smoking use included cigarettes. He has never used smokeless tobacco. He reports that he does not drink alcohol and does not use drugs.    Family History  Family History[1]     Allergies  Patient has no known allergies.    Review of Systems   Negative for fever or chills  Negative for sore throat, ear pain, nasal discharge  Negative for cough, shortness of breath or wheezing  Negative for chest pain, palpitations, swelling of legs  Negative for abdominal pain, constipation, diarrhea, blood in the stools  Negative for urinary complaints  Negative for headache, dizziness, weakness or numbness  Negative for joint pain  Positive for depression or anxiety  All other systems reviewed and were negative   Physical Exam   Constitutional: Patient does not appear to be in any acute distress  Head and Face: NCAT  Eyes: Normal external exam, EOMI  ENT: Normal external inspection of ears and nose. Oropharynx normal.  Cardiovascular: RRR, S1/S2, no murmurs, rubs, or gallops, radial pulses +2, no edema of extremities  Pulmonary: CTAB, no respiratory distress.  Abdomen: +BS, soft, non-tender, nondistended, no guarding or rebound, no masses noted  MSK: No joint swelling, normal movements of all extremities. Range of motion- normal.  Skin- No lesions,  "contusions, or erythema.  Peripheral puslses palpable bilaterally 2+  Neuro: AAO X1-2, Cranial nerves 2-12 grossly intact,DTR 2+ in all 4 limbs      Last Recorded Vitals  Blood pressure 125/79, pulse 62, temperature 36.2 °C (97.2 °F), temperature source Temporal, resp. rate 18, height 1.702 m (5' 7\"), weight 61.2 kg (135 lb), SpO2 98%.    Relevant Results  Results for orders placed or performed during the hospital encounter of 08/05/25 (from the past 96 hours)   Electrocardiogram, 12-lead   Result Value Ref Range    Ventricular Rate 68 BPM    Atrial Rate 69 BPM    OH Interval 126 ms    QRS Duration 90 ms    QT Interval 370 ms    QTC Calculation(Bazett) 394 ms    P Axis 59 degrees    R Axis 51 degrees    T Axis 38 degrees    QRS Count 11 beats    Q Onset 252 ms    T Offset 437 ms    QTC Fredericia 386 ms   Sars-CoV-2 PCR   Result Value Ref Range    Coronavirus 2019, PCR Not Detected Not Detected   CBC and Auto Differential   Result Value Ref Range    WBC 9.3 4.4 - 11.3 x10*3/uL    nRBC 0.0 0.0 - 0.0 /100 WBCs    RBC 4.61 4.50 - 5.90 x10*6/uL    Hemoglobin 14.8 13.5 - 17.5 g/dL    Hematocrit 43.2 41.0 - 52.0 %    MCV 94 80 - 100 fL    MCH 32.1 26.0 - 34.0 pg    MCHC 34.3 32.0 - 36.0 g/dL    RDW 13.2 11.5 - 14.5 %    Platelets 232 150 - 450 x10*3/uL    Neutrophils % 63.8 40.0 - 80.0 %    Immature Granulocytes %, Automated 0.2 0.0 - 0.9 %    Lymphocytes % 22.6 13.0 - 44.0 %    Monocytes % 11.1 2.0 - 10.0 %    Eosinophils % 1.5 0.0 - 6.0 %    Basophils % 0.8 0.0 - 2.0 %    Neutrophils Absolute 5.95 1.20 - 7.70 x10*3/uL    Immature Granulocytes Absolute, Automated 0.02 0.00 - 0.70 x10*3/uL    Lymphocytes Absolute 2.11 1.20 - 4.80 x10*3/uL    Monocytes Absolute 1.04 (H) 0.10 - 1.00 x10*3/uL    Eosinophils Absolute 0.14 0.00 - 0.70 x10*3/uL    Basophils Absolute 0.07 0.00 - 0.10 x10*3/uL   Comprehensive Metabolic Panel   Result Value Ref Range    Glucose 91 74 - 99 mg/dL    Sodium 139 136 - 145 mmol/L    Potassium 3.9 3.5 - " 5.3 mmol/L    Chloride 108 (H) 98 - 107 mmol/L    Bicarbonate 27 21 - 32 mmol/L    Anion Gap 8 (L) 10 - 20 mmol/L    Urea Nitrogen 17 6 - 23 mg/dL    Creatinine 0.91 0.50 - 1.30 mg/dL    eGFR >90 >60 mL/min/1.73m*2    Calcium 9.0 8.6 - 10.3 mg/dL    Albumin 3.9 3.4 - 5.0 g/dL    Alkaline Phosphatase 49 33 - 136 U/L    Total Protein 6.0 (L) 6.4 - 8.2 g/dL    AST 12 9 - 39 U/L    Bilirubin, Total 0.6 0.0 - 1.2 mg/dL    ALT 10 10 - 52 U/L   Acute Toxicology Panel, Blood   Result Value Ref Range    Acetaminophen <10.0 10.0 - 30.0 ug/mL    Salicylate  <3 4 - 20 mg/dL    Alcohol <10 <=10 mg/dL   Urinalysis with Reflex Culture and Microscopic   Result Value Ref Range    Color, Urine Light-Yellow Light-Yellow, Yellow, Dark-Yellow    Appearance, Urine Clear Clear    Specific Gravity, Urine 1.021 1.005 - 1.035    pH, Urine 5.5 5.0, 5.5, 6.0, 6.5, 7.0, 7.5, 8.0    Protein, Urine NEGATIVE NEGATIVE, 10 (TRACE), 20 (TRACE) mg/dL    Glucose, Urine Normal Normal mg/dL    Blood, Urine NEGATIVE NEGATIVE mg/dL    Ketones, Urine NEGATIVE NEGATIVE mg/dL    Bilirubin, Urine NEGATIVE NEGATIVE mg/dL    Urobilinogen, Urine Normal Normal mg/dL    Nitrite, Urine NEGATIVE NEGATIVE    Leukocyte Esterase, Urine 25 Noemi/uL (A) NEGATIVE   Microscopic Only, Urine   Result Value Ref Range    WBC, Urine 1-5 1-5, NONE /HPF    RBC, Urine 1-2 NONE, 1-2, 3-5 /HPF    Mucus, Urine FEW Reference range not established. /LPF   Drug Screen, Urine   Result Value Ref Range    Amphetamine Screen, Urine Presumptive Negative Presumptive Negative    Barbiturate Screen, Urine Presumptive Negative Presumptive Negative    Benzodiazepines Screen, Urine Presumptive Negative Presumptive Negative    Cannabinoid Screen, Urine Presumptive Positive (A) Presumptive Negative    Cocaine Metabolite Screen, Urine Presumptive Negative Presumptive Negative    Fentanyl Screen, Urine Presumptive Negative Presumptive Negative    Opiate Screen, Urine Presumptive Negative Presumptive  Negative    Oxycodone Screen, Urine Presumptive Negative Presumptive Negative    PCP Screen, Urine Presumptive Negative Presumptive Negative    Methadone Screen, Urine Presumptive Negative Presumptive Negative         Assessment/Plan     Active Hospital Problems    Seizure disorder (Multi)      *Atypical psychosis    Patient is being seen for medical management at [facility].  Patient was admitted to the ER with paranoid delusions.  Patient had workup done in the ER.  Urine tox screen is positive for cannabinoids.  Patient's CMP and CBC is normal urine is normal.  Patient's urine culture is normal.  Patient does have a history of seizures and is on Keppra.  Will continue with the same dose.  Patient will be seen by [facility] team and will monitor for any medical issues.    I spent 25 minutes in the professional and overall care of this patient.               [1] No family history on file.

## 2025-08-06 NOTE — GROUP NOTE
Group Topic: Music Therapy   Group Date: 8/6/2025  Start Time: 1100  End Time: 1200  Facilitators: Vesna Hennessy   Department: [facility] EXPRESSIVE THER VIRTUAL    Number of Participants: 11   Group Focus: expressive outlet, feeling awareness/expression, and self-esteem/task mastery  Treatment Modality: Music Therapy  Interventions Utilized were: active music engagement, empathic listening/validating emotions, exploration, and passive music engagement    Pts were invited to share their musical background, interests, and given the choice of whether they'd like to make group music or have an introspective intervention. Pts decided together to sing and play songs as a group with uplifting and resilient messages.      Name: Ellis Pollack YOB: 1960   MR: [id number]      Level of Participation: moderate  Quality of Participation: attention seeking, distracting to others, and side talking  Interactions with others: appropriate and sarcastic  Mood/Affect: blunted and flat  Cognition, Pre Treatment: confused and tracking difficulty  Cognition, Post Treatment: confused and tracking difficulty  Progress: Minimal  Plan: continue with services    Pt disruptive at times- asking a lot of questions about staff/ programming, also about life in general. Redirectable at times, but usually tangential and talked through most of session regardless.

## 2025-08-07 ASSESSMENT — PAIN - FUNCTIONAL ASSESSMENT
PAIN_FUNCTIONAL_ASSESSMENT: 0-10
PAIN_FUNCTIONAL_ASSESSMENT: 0-10

## 2025-08-07 ASSESSMENT — PAIN SCALES - GENERAL
PAINLEVEL_OUTOF10: 0 - NO PAIN
PAINLEVEL_OUTOF10: 0 - NO PAIN

## 2025-08-07 NOTE — CARE PLAN
The clinical goals for the shift include maintain safety, promote rest.    Over the shift, the patient did not make progress toward the following goals. Barriers to progression include irritability, uncooperative. Recommendations to address these barriers include redirection as needed, encourage med compliance.

## 2025-08-07 NOTE — CONSULTS
Inpatient consult to Podiatry  Consult performed by: Diego Manzanares DPM  Consult ordered by: Jasvir Dhillon DO        Reason For Consult  Routine footcare needs    History Of Present Illness  Ellis Pollack is a 64 y.o. male currently admitted to [facility] secondary to development of paranoid delusions thinking the government was trying to hurt him.  Podiatric consultation was placed secondary to routine care needs.  Patient with painful elongated nail plates.  Nondiabetic.       Past Medical History  He has no past medical history on file.    Surgical History  He has no past surgical history on file.     Social History  He reports that he has quit smoking. His smoking use included cigarettes. He has never used smokeless tobacco. He reports that he does not drink alcohol and does not use drugs.    Family History  Family History[1]     Allergies  Patient has no known allergies.    Review of Systems    REVIEW OF SYSTEMS  GENERAL:  Negative for malaise, significant weight loss, fever  HEENT:  No changes in hearing or vision, no nose bleeds or other nasal problems and Negative for frequent or significant headaches  NECK:  Negative for lumps, goiter, pain and significant neck swelling  RESPIRATORY:  Negative for cough, wheezing and shortness of breath  CARDIOVASCULAR:  Negative for chest pain, leg swelling and palpitations  GI:  Negative for abdominal discomfort, blood in stools or black stools and change in bowel habits  :  Negative for dysuria, frequency and incontinence  MUSCULOSKELETAL:  Negative for joint pain or swelling, back pain, and muscle pain.  SKIN:  Negative for lesions, rash, and itching  PSYCH:  Negative for sleep disturbance, mood disorder and recent psychosocial stressors  HEMATOLOGY/LYMPHOLOGY:  Negative for prolonged bleeding, bruising easily, and swollen nodes.  ENDOCRINE:  Negative for cold or heat intolerance, polyuria, polydipsia and goiter  NEURO: Negative, denies any burning,  "tingling or numbness     Objective:   Vasc: DP and PT pulses are palpable bilateral.  CFT is less than 3 seconds bilateral.  Skin temperature is warm to cool proximal to distal bilateral.      Neuro:  Light touch is intact to the foot bilateral.  Protective sensation is intact to the foot when tested with the 5.07 SWM bilateral.  There is no clonus noted.  The hallux is downgoing bilateral.      Derm: Nails 1-5 bilateral are elongated, mycotic appearing with pain to bilateral hallux borders.  Skin is supple with normal texture and turgor noted.  Webspaces are clean, dry and intact bilateral.  There are no hyperkeratoses, ulcerations, verruca or other lesions noted.      Ortho: Pain to bilateral hallux borders.  Muscle strength is 5/5 for all pedal groups tested.  Ankle joint, subtalar joint, 1st MPJ and lesser MPJ ROM is full and without pain or crepitus.  The foot type is rectus bilateral off weight bearing.  There are no structural deformities noted.       Physical Exam     Last Recorded Vitals  Blood pressure 132/77, pulse 74, temperature 36.9 °C (98.4 °F), temperature source Temporal, resp. rate 18, height 1.702 m (5' 7\"), weight 61.2 kg (135 lb), SpO2 98%.       Assessment/Plan   Pain into the left foot  Pain in toe right foot  Onychomycosis    Pedal exam performed today.  Nail debridement was performed without incident with sterile nail nipper.  No other remarkable issues on exam today.  Discussed podiatric follow-up outpatient as needed.      I spent 50 minutes in the professional and overall care of this patient.    Diego Manzanares DPM                 [1] No family history on file.    "

## 2025-08-07 NOTE — PROGRESS NOTES
Social Work Note    Met with pt to review consent paperwork. Pt signed in voluntary, signed GOMEZ for wife Em, and signed medicare, bill of rights, and general consents.     Pt was cooperative. Shared concerns about not having needs met quickly enough and believing he does not require hospitalization, but that yesterday he thought this placement was a care home, so he is in better spirits now that he knows it is a hospital.     [facility]il[facility] Hernandez [facility]

## 2025-08-07 NOTE — NURSING NOTE
"ALLY NOTE     Problem:  Delusions     Behavior:  Pt on phone with wife at start of shift. Loud, yelling into phone, disruptive to milieu. OOR for snack, cooperative with VS. When this RN went to pt room for med pass, pt irritable and angry, demanding this nurse leave him alone. Pt refused scheduled meds, stating \"I'm sick of being harrassed by everyone. It's all harrassment. Take your money and shove it.\"  Group Participation: n/a  Appetite/Meals: HS snack provided     Interventions:  1:1 provided with reassurance and much encouragement for med compliance. Encouraged pt to make needs known.    Response:  Pt resting in bed overnight. OOR this morning around 0545 requesting we change the date on his board. Pt expressed gratefulness when PCA updated his board.      Plan:  Will continue to monitor behaviors and effectiveness of interventions while maintaining pt safety.    "

## 2025-08-07 NOTE — GROUP NOTE
Group Topic: Goals   Group Date: 8/7/2025  Start Time: 0730  End Time: 0800  Facilitators: Jyoti Foote   Department: [facility] Geriatric     Number of Participants: 12   Group Focus: daily focus and goals  Treatment Modality: Other: Daily goal setting  Interventions utilized were assignment  Purpose: other: Daily goal setting    Name: Ellis Pollack YOB: 1960   MR: [id number]      Facilitator: Mental Health PCNA  Level of Participation: minimal  Quality of Participation: appropriate/pleasant and cooperative  Interactions with others: appropriate  Mood/Affect: appropriate  Cognition: coherent/clear  Progress: Minimal  Comments: Patient problem is atypical psychosis. Patient attended group and took handout but did not fill in all areas needing completion.   Plan: continue with services

## 2025-08-07 NOTE — GROUP NOTE
Group Topic: Other   Group Date: 8/7/2025  Start Time: 1330  End Time: 1430  Facilitators: BONNIE ColeS   Department: [facility] REHABTH VIRTUAL    Number of Participants: 11   Group Focus: leisure skills, reminiscence, self-esteem, and social skills  Treatment Modality: Leisure Development and Other: Recreation Therapy  Interventions utilized were group exercise, leisure development, mental fitness, and reminiscence  Purpose: In this Recreation Therapy group of [facility] patients were prompted to engage together in a game which aims to promote increased socialization, motivation, activity level, following directions, concentration along with utilizing mental fitness    Name: Ellis Pollack YOB: 1960   MR: [id number]      Facilitator: Recreational Therapist  Level of Participation: active  Quality of Participation: appropriate/pleasant, attentive, cooperative, and engaged  Interactions with others: appropriate  Mood/Affect: appropriate and bright  Cognition: coherent/clear  Progress: Moderate  Comments: patient engages well with peers. Displays good attention to tasks with some prompting needed. Appropriate mood and behaviors.   Plan: continue with services

## 2025-08-07 NOTE — CARE PLAN
The patient's goals for the shift include go to a group but I dont need to be here    The clinical goals for the shift include participate in group activities    Over the shift, the patient did make progress toward the following goals.       Problem: Chronic Conditions and Co-morbidities  Goal: Patient's chronic conditions and co-morbidity symptoms are monitored and maintained or improved  Outcome: Progressing

## 2025-08-07 NOTE — GROUP NOTE
Group Topic: Goals   Group Date: 8/6/2025  Start Time: 2000  End Time: 2030  Facilitators: Laura Corley   Department: [facility] Geriatric     Number of Participants: 12   Group Focus: goals  Treatment Modality: Individual Therapy and Patient-Centered Therapy  Interventions utilized were problem solving  Purpose: communication skills, self-worth, and self-care    Name: Ellis Pollack YOB: 1960   MR: [id number]      Facilitator: Mental Health PCNA  Level of Participation: minimal  Quality of Participation: appropriate/pleasant  Interactions with others: appropriate  Mood/Affect: appropriate  Triggers (if applicable): N/A  Cognition: coherent/clear  Progress: Minimal  Comments:  pt problem is delusional thought.   Plan: continue with services

## 2025-08-07 NOTE — PROGRESS NOTES
"Ellis Pollack is a 64 y.o. male on day 2 of admission presenting with Atypical psychosis.      Subjective   Case discussed in morning team and chart reviewed.  Patient slept well last night, appetite is ok. He is less irritable today. He declined totake risperdal last night but provder explained the reasoning behind oprescribing it in a way he seemed tohear today and he agreed to take it- ordered risperdal 1mg bid for psychosis/anxiety.       Objective     Last Recorded Vitals  Blood pressure 132/77, pulse 74, temperature 36.9 °C (98.4 °F), temperature source Temporal, resp. rate 18, height 1.702 m (5' 7\"), weight 61.2 kg (135 lb), SpO2 98%.         Sleep Log  Estimated \"Sleeping\" Durations   Night Est. Hours   08/05 8.00-8.75   08/06 8.50-9.25   08/07 0.00        Review of Systems    Psychiatric ROS - Adult  Anxiety: General Anxiety Disorder (SAPPHIRE)SAPPHIRE Behaviors: difficult to control worry, excessive anxiety/worry, difficulty concentrating, irritability, restlessness, and sleep disturbance  Depression: anhedonia, concentration, helpless, and irritability  Delirium: negative  Psychosis: delusions and paranoia  Elizabeth: negative  Safety Issues: delusions  Psychiatric ROS Comment: admits to confusion at times  Physical Exam      Mental Status Exam  General: appears older than stated age, mildly disheveled  Appearance: hospital attire  Attitude: mildly frustrated, irritable  Behavior: cooperative  Motor Activity: gait steady, no abnormal movements noted  Speech: normal rate and volume  Mood: \"I am frustrated and confused with this world\"  Affect: mildly constricted  Thought Process: moderately disorganized  Thought Content: probable delusions, probable paranoia, deies SI/HI  Thought Perception: denies AH/VH  Cognition: alert and oriented x4  Insight: patient is aware that he has confusion, he denies mentalhealthissuesnow or in thepast  Judgement: patient is able to participate in medical decision making     Psychiatric " Risk Assessment  Violence Risk Assessment: major mental illness, male, substance abuse, and other possible developing dementia  Acute Risk of Harm to Others is Considered: moderate   Suicide Risk Assessment: age > 65 yrs old, , and male  Protective Factors against Suicide: other denies SI/HI, denies past SA  Acute Risk of Harm to Self is Considered: low    Relevant Results           Scheduled medications  Scheduled Medications[1]  Continuous medications  Continuous Medications[2]  PRN medications  PRN Medications[3]   No results found for this or any previous visit (from the past 24 hours).   Assessment & Plan  Atypical psychosis    Seizure disorder (Multi)    Patient is a 65yo male dx with anxiety, psychosis.  He is presenting as suspicious /paranoia, moderately disorganized,though adding onto the political division in the [place].  He says everything is a  conspiracy theory, that he can't believe anything that is said on the news, anything that is in his email. He says he is always feeling frustrated and confused.  He does have a hisptory of mental health issues and does deny that as well though chart review says he is in cousneling twice per month for anxiety.  He lives with his wife who is 64yo.     Biological  -increase risperdal 1mg at bid for psychosis/agitation/anxiety with possible intentof DYER as patient does not appear to be cognizant of need for medication  -cont to assess for dementia  -prns available  -medical pertinences appreciated     Psychological     -Provider encourages patient to attend all groups.     Sociological     -Provider encourages patient to work with  social work to work on discharge plan. Will contact family for collateral               I spent 25 minutes in the professional and overall care of this patient.      Lucero Batista, APRN-CNS           [1] calcium carbonate, 1,250 mg of calcium carbonate, oral, Daily  levETIRAcetam, 500 mg, oral, BID  risperiDONE, 1 mg,  oral, BID  [2]    [3] PRN medications: acetaminophen, alum-mag hydroxide-simeth, hydrOXYzine HCL, magnesium hydroxide, nicotine polacrilex, OLANZapine **OR** OLANZapine, traZODone

## 2025-08-07 NOTE — GROUP NOTE
Group Topic: Symptom Management   Group Date: 8/7/2025  Start Time: 1030  End Time: 1130  Facilitators: Apoorva FALL CTRS   Department: [facility] REHABTH VIRTUAL    Number of Participants: 12   Group Focus: coping skills, feeling awareness/expression, personal responsibility, and self-awareness  Treatment Modality: Other: Recreation Therapy, Education  Interventions utilized were exploration, patient education, and support  Purpose: Patients engaged in a group session aimed to increase knowledge focused on stressors/triggers, warning signs/symptoms and personal responsibilities. Pts also encouraged to create a coping skills toolbox for those times when in a distressed situation/s. This group also aims to help foster use of prosocial behaviors and become aware of and handle negative behaviors in a more effective manner.     Name: Ellis Pollack YOB: 1960   MR: [id number]      Facilitator: Recreational Therapist  Level of Participation: active  Quality of Participation: disruptive, intrusive, redirectable, and side talking  Interactions with others: distracting and intrusive  Mood/Affect: restless  Cognition: no insight  Progress: Minimal  Comments: engages without prompting. Little insight. Often not following topic. Intrusive and needs redirection.   Plan: continue with services

## 2025-08-07 NOTE — NURSING NOTE
"ALLY NOTE     Problem:  Paranoia     Behavior:  This patient has been calm and cooperative.  He went to am group and he is taking scheduled medications.  Pt is alert and oriented to person, place and time but became confused about what medications he took at breakfast.  Pt made remarks about the world being in turmoil and stated \"What are you going to do about it?\"  But did not say anything more.  Pt observed talking to himself at times.  Group Participation: yes  Appetite/Meals: good       Interventions:  1:1 interaction with encouragement to go to groups and take scheduled medications.  Every 15 minute checks  Monitor for side effects      Response:  Pt has been walking the hallways for exercise.  He has been calm and cooperative      Plan:  1:1 interaction with encouragement to go to groups and take scheduled medications.  Every 15 minute checks  Monitor for side effects    "

## 2025-08-08 ASSESSMENT — PAIN SCALES - GENERAL
PAINLEVEL_OUTOF10: 0 - NO PAIN
PAINLEVEL_OUTOF10: 0 - NO PAIN

## 2025-08-08 ASSESSMENT — PAIN - FUNCTIONAL ASSESSMENT
PAIN_FUNCTIONAL_ASSESSMENT: 0-10
PAIN_FUNCTIONAL_ASSESSMENT: 0-10

## 2025-08-08 NOTE — PROGRESS NOTES
Ellis Pollack is a 64 y.o. male on day 3 of admission presenting with Atypical psychosis. Patient remains inpatient for ongoing care and treatment. Patient was conversational with this worker this date. He was noted to attend group and socialized with staff at nurses station. Spoke to NP this PM who made aware patient contacted the help line this date. She was also able to connect with spouse Em for collateral information.  will continue to follow as a resource for transition of care and will meet with patient to answer questions or concerns, and collaborate with IDT on patient needs to ensure safe discharge plan. There is no anticipated date of discharge at this time.

## 2025-08-08 NOTE — NURSING NOTE
"JOSIEP NOTE     Problem:  Delusions     Behavior:  Pt out in milieu this evening. Pt irritable, disorganized and preoccupied with unit information sheet from red folder. Pt pointing to phone access, stating \"I don't even have a phone.\" Writing [phone] on the sheet , reporting he tried calling and it would not go through. Pt easily distracted with med pass. Pt selectively compliant with meds, taking scheduled keppra but refusing risperdal, stating he only takes it once a day in the morning.  Group Participation: n/a  Appetite/Meals: HS snack provided     Interventions:  1:1 provided with redirection and reassurance as needed. Encouraged pt to make needs known.    Response:  Pt resting in bed overnight. 0520-Pt OOR approaches nurses station pointing his finger at this RN stating \"Playing truth or dare is hurting thousands of people and you know it.\" Pt mumbled something under his breath as he walked back to his room.      Plan:  Will continue to monitor behaviors and effectiveness of interventions while maintaining pt safety.    "

## 2025-08-08 NOTE — NURSING NOTE
ALLY NOTE     Problem:  Paranoia       Behavior:  Pt refused taking Risperidone and Os jaspreet this morning.  Pt was confused about how many medications he takes in the morning.  Attempted to go over medications with patient and he refused teaching.  Group Participation: yes  Appetite/Meals: good         Interventions:  Education regarding medications  Encourage pt to go to group activities  Discuss in team meeting    Response:  Pt came to nurses station and stated he was confused about his medications and he is willing to take them now.       Plan:  Encourage pt to take his medications  Discuss in team meeting  Every 15 minutes

## 2025-08-08 NOTE — CARE PLAN
The patient's goals for the shift include pt did not state a goal    The clinical goals for the shift include encourage pt to take his medications    Over the shift, the patient did make progress toward the following goals.       Problem: Chronic Conditions and Co-morbidities  Goal: Patient's chronic conditions and co-morbidity symptoms are monitored and maintained or improved  Outcome: Progressing

## 2025-08-08 NOTE — GROUP NOTE
"Group Topic: Other   Group Date: 8/8/2025  Start Time: 1330  End Time: 1430  Facilitators: Apoorva FALL CTRS   Department: [facility] REHABTH VIRTUAL    Number of Participants: 10   Group Focus: leisure skills, self-esteem, and social skills, perception  Treatment Modality: Leisure Development and Other: Recreation Therapy  Interventions utilized were group exercise, leisure development, and support  Purpose: This session involved a leisure activity game “apples to apples\".  This is a perspective-based game which involves cognitive tasks, social interactions, leisure awareness, and following directions.    Name: Ellis Pollack YOB: 1960   MR: [id number]      Facilitator: Recreational Therapist  Level of Participation: active  Quality of Participation: appropriate/pleasant, cooperative, and engaged  Interactions with others: appropriate  Mood/Affect: appropriate  Cognition: loose  Progress: Moderate  Comments: joins group half way. Needs some reminders to follow directions. Appropriate interactions displays.   Plan: continue with services      "

## 2025-08-08 NOTE — GROUP NOTE
Group Topic: Self-Care/Wellness   Group Date: 8/8/2025  Start Time: 1030  End Time: 1130  Facilitators: BONNIE ColeS   Department: [facility] REHAB[facility] VIRTUAL    Number of Participants: 9   Group Focus: coping skills, feeling awareness/expression, healthy friendships, leisure skills, problem solving, self-esteem, and social skills  Treatment Modality: Psychoeducation and Other: Recreation Therapy  Interventions utilized were exploration, patient education, and support  Purpose: Pt engaged in group session focused on the topic of protective factors. This group aims to review multiple protective factors and ngoc to indicate how well they are performing in each area. Patents then discussed which factors have been the most valuable and ones they would like to improve on, why and steps to take to improve.      Name: Ellis Pollack YOB: 1960   MR: [id number]      Facilitator: Recreational Therapist  Level of Participation: moderate  Quality of Participation: disruptive, distractible, distracting to others, and intrusive  Interactions with others: distracting and intrusive  Mood/Affect: restless  Cognition: no insight, confused  Progress: Minimal  Comments: pt often needs redirection secondary to intrusive and disruption. Often uttering indistinctly. Distracted by coloring sheets.   Plan: continue with services
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
## 2025-08-08 NOTE — GROUP NOTE
Group Topic: Goals   Group Date: 8/8/2025  Start Time: 0730  End Time: 0800  Facilitators: Zaira Spring LPN   Department: [facility]    Number of Participants: 7   Group Focus: goals  Treatment Modality: Patient-Centered Therapy  Interventions utilized were exploration  Purpose: feelings    Name: Ellis Pollack YOB: 1960   MR: [id number]      Facilitator: Mental Health [facility]  Level of Participation: minimal  Quality of Participation: appropriate/pleasant  Interactions with others: minimal  Mood/Affect: agitated, angry, blunted, incongruent, and irritable  Triggers (if applicable): none  Cognition: confused, not focused, and pressured speech  Progress: Minimal  Comments: patient presented with Atypical psychosis  Plan: continue with services

## 2025-08-08 NOTE — PROGRESS NOTES
"[facility]: [facility]  PROGRESS NOTE      Ellis Pollack is a 64 y.o. male that is being seen  today for follow up at [facility].  Subjective   Pt. Is being seen for follow up at [facility].  Patient has been stable.  Vital signs are stable.  Patient is a pleasantly confused.  Not able to provide adequate review of system.      ROS  Patient is pleasantly confused and not able to provide adequate review of system.  Vitals:    08/06/25 1900   BP: 132/77   Pulse: 74   Resp: 18   SpO2: 98%      Vitals:    08/05/25 2036   Weight: 61.2 kg (135 lb)     Body mass index is 21.14 kg/m².  Physical Exam  Constitutional: Patient does not appear to be in any acute distress  Cardiovascular: RRR, S1/S2, no murmurs, rubs, or gallops, radial pulses +2, no edema of extremities  Pulmonary: CTAB, no respiratory distress.  Abdomen: +BS, soft, non-tender, nondistended, no guarding or rebound, no masses noted  MSK: No joint swelling, normal movements of all extremities. Range of motion- normal.  Skin- No lesions, contusions, or erythema.  Peripheral puslses palpable bilaterally 2+  Neuro: AAO X1-2, Cranial nerves 2-12 grossly intact,DTR 2+ in all 4 limbs       LABS   [unfilled]  Lab Results   Component Value Date    GLUCOSE 91 08/05/2025    CALCIUM 9.0 08/05/2025     08/05/2025    K 3.9 08/05/2025    CO2 27 08/05/2025     (H) 08/05/2025    BUN 17 08/05/2025    CREATININE 0.91 08/05/2025     Lab Results   Component Value Date    ALT 10 08/05/2025    AST 12 08/05/2025    ALKPHOS 49 08/05/2025    BILITOT 0.6 08/05/2025     Lab Results   Component Value Date    WBC 9.3 08/05/2025    HGB 14.8 08/05/2025    HCT 43.2 08/05/2025    MCV 94 08/05/2025     08/05/2025     No results found for: \"CHOL\"  No results found for: \"HDL\"  No results found for: \"LDLCALC\"  No results found for: \"TRIG\"  Lab Results   Component Value Date    HGBA1C 5.4 11/22/2021     Other labs not included in the list above were reviewed " either before or during this encounter.    History    Medical History[1]  Surgical History[2]  Family History[3]  Allergies[4]  Medications Ordered Prior to Encounter[5]  Immunization History   Administered Date(s) Administered    Moderna SARS-CoV-2 Vaccination 04/10/2021, 05/08/2021, 12/28/2021    Pfizer COVID-19 vaccine, 12 years and older, (30mcg/0.3mL) (Comirnaty) 12/06/2023, 09/29/2024    Pfizer COVID-19 vaccine, bivalent, age 12 years and older (30 mcg/0.3 mL) 12/20/2022    Tdap vaccine, age 7 year and older (BOOSTRIX, ADACEL) 02/22/2024     Patient's medical history was reviewed and updated either before or during this encounter.  ASSESSMENT / PLAN:  Active Hospital Problems    Seizure disorder (Multi)      *Atypical psychosis       Patient is being seen for follow-up at [facility].  Patient has seizure disorder and is on Keppra.  Patient's vital signs are stable.  Patient is being seen by [facility] team for atypical psychosis.  Patient is pleasantly confused not able to provide adequate review of systems.      Pierce Murray MD          [1] History reviewed. No pertinent past medical history.  [2] History reviewed. No pertinent surgical history.  [3] No family history on file.  [4] No Known Allergies  [5]   No current facility-administered medications on file prior to encounter.     Current Outpatient Medications on File Prior to Encounter   Medication Sig Dispense Refill    calcium carbonate 500 mg calcium (1,250 mg) chewable tablet Chew and swallow 1 tablet (500 mg of elemental calcium) once daily.      levETIRAcetam (Keppra) 500 mg tablet Take 1 tablet (500 mg) by mouth 2 times a day. 60 tablet 11    multivitamin tablet Take 1 tablet by mouth once daily.

## 2025-08-09 LAB
ATRIAL RATE: 69 BPM
P AXIS: 59 DEGREES
PR INTERVAL: 126 MS
Q ONSET: 252 MS
QRS COUNT: 11 BEATS
QRS DURATION: 90 MS
QT INTERVAL: 370 MS
QTC CALCULATION(BAZETT): 394 MS
QTC FREDERICIA: 386 MS
R AXIS: 51 DEGREES
T AXIS: 38 DEGREES
T OFFSET: 437 MS
VENTRICULAR RATE: 68 BPM

## 2025-08-09 ASSESSMENT — PAIN - FUNCTIONAL ASSESSMENT: PAIN_FUNCTIONAL_ASSESSMENT: 0-10

## 2025-08-09 ASSESSMENT — PAIN SCALES - WONG BAKER: WONGBAKER_NUMERICALRESPONSE: NO HURT

## 2025-08-09 ASSESSMENT — PAIN SCALES - GENERAL
PAINLEVEL_OUTOF10: 0 - NO PAIN
PAINLEVEL_OUTOF10: 0 - NO PAIN

## 2025-08-09 NOTE — CARE PLAN
The patient's goals for the shift include pt did not state a goal    The clinical goals for the shift include maintain safety/rest        Problem: Safety - Adult  Goal: Free from fall injury  Outcome: Progressing     Problem: Discharge Planning  Goal: Discharge to home or other facility with appropriate resources  Outcome: Progressing     Problem: Fall/Injury  Goal: Verbalize understanding of personal risk factors for fall in the hospital  Outcome: Progressing     Problem: Fall/Injury  Goal: Verbalize understanding of risk factor reduction measures to prevent injury from fall in the home  Outcome: Progressing     Problem: Community resource needs  Goal: Patient is receiving increased resource support to enhance ability to remain at home  Outcome: Progressing     Problem: Mental health issues  Goal: Stabilize adverse mental health factors affecting plan of care  Outcome: Progressing

## 2025-08-09 NOTE — NURSING NOTE
ALLY NOTE     Problem:  Paranoia and Delusions       Behavior:  Pt presents calm at time of assessment. Pt Denies Auditory and Visual , and Denies suicidal ideation with plan, and voices no homicidal ideation at this time. Pt continues to have thought of paranoia AEB asking staff to check chart to make sure that his house keys was checked in when he came in. Pt voices 0/10 pain at this time during assessment. Pt educated on medications and has no other new concerns. Pt compliant with medication at this time. Pt has not been given PRN medications. Pt has no other new concerns with nutritional needs at this time. Pt encouraged to attend unit programing throughout shift to and to adopt positive coping skills. Pt also encouraged to wear slip resistance foot wear to reduce and minimize falls while ambulating around the hospital unit. Pt encouraged to be social while on unit with other peers.     Group Participation: No   Appetite/Meals: Excellent    [unfilled]      Interventions:  Pt. Was offered groups and their scheduled medications per MAR orders.    PRN Medications:  N/A     Response:  Pt. Has been compliant w/ meds, tolerated them well; and did not attend the majority of group sessions, while having very minium outburst of labile behaviors.      Plan:  Pt. will continue to be monitored Q 15 minutes  for changes in mental status & safety on the unit.    Charly Shafer RN  8/9/2025

## 2025-08-09 NOTE — PROGRESS NOTES
"Ellis Pollack is a 64 y.o. male on day 4 of admission presenting with Atypical psychosis.      Subjective   Case discussed with team and chart reviewed. Fair sleep and appetite. No irritability. Attending groups.    Pt asked this provider why he was admitted, and despite explaining concerning behavior and cognitive symptoms, he continued to say he does not understand why he is admitted. Pt trying to remember AED that he stopped last year because it didn't work.    States he is anxious from waiting here. States he always tried to do right, is bothered by how broken the world is, trying to balance love and jealousy and greed.        Objective     Last Recorded Vitals  Blood pressure 124/78, pulse 79, temperature 35.9 °C (96.6 °F), temperature source Temporal, resp. rate 16, height 1.702 m (5' 7\"), weight 61.2 kg (135 lb), SpO2 100%.         Sleep Log  Estimated \"Sleeping\" Durations   Night Est. Hours   08/05 8.00-8.75   08/06 8.50-9.25   08/07 6.75-7.50   08/08 7.75-8.75        Review of Systems    Psychiatric ROS - Adult  Anxiety: General Anxiety Disorder (SAPPHIRE)SAPPHIRE Behaviors: difficult to control worry, excessive anxiety/worry, difficulty concentrating, irritability, restlessness, and sleep disturbance  Depression: anhedonia, concentration, helpless, and irritability  Delirium: negative  Psychosis: delusions and paranoia  Elizabeth: negative  Safety Issues: delusions  Psychiatric ROS Comment: admits to confusion at times  Physical Exam      Mental Status Exam  General: appears older than stated age, mildly disheveled  Appearance: hospital attire  Attitude: cooperative  Behavior: cooperative  Motor Activity: gait steady, no abnormal movements noted  Speech: normal rate and volume  Mood: \"I don't understand why I'm here\", anxious  Affect: mildly constricted, anxious, confused  Thought Process: perseverative, some loose associations, tangential  Thought Content: probable delusions, probable paranoia, deies SI/HI  Thought " Perception: denies AH/VH  Cognition: alert and oriented x4  Insight: patient is aware that he has confusion, he denies mentalhealthissuesnow or in thepast  Judgement: patient is able to participate in medical decision making     Psychiatric Risk Assessment  Violence Risk Assessment: major mental illness, male, substance abuse, and other possible developing dementia  Acute Risk of Harm to Others is Considered: moderate   Suicide Risk Assessment: age > 65 yrs old, , and male  Protective Factors against Suicide: other denies SI/HI, denies past SA  Acute Risk of Harm to Self is Considered: low    Relevant Results           Scheduled medications  Scheduled Medications[1]  Continuous medications  Continuous Medications[2]  PRN medications  PRN Medications[3]   No results found for this or any previous visit (from the past 24 hours).   Assessment & Plan  Atypical psychosis    Seizure disorder (Multi)    Patient is a 65yo male dx with anxiety, psychosis.  He is presenting as suspicious /paranoia, moderately disorganized,though adding onto the political division in the [place].  He says everything is a  conspiracy theory, that he can't believe anything that is said on the news, anything that is in his email. He says he is always feeling frustrated and confused.  He does have a hisptory of mental health issues and does deny that as well though chart review says he is in cousneling twice per month for anxiety.  He lives with his wife who is 64yo.     Biological  -continue increased risperdal 1mg at bid for psychosis/agitation/anxiety with possible intentof DYER as patient does not appear to be cognizant of need for medication  -cont to assess for dementia  -consider organic etiology of paranoia.agitation as onset of seizures coincided with change in personality(MRI 7/6/2023 at onset of seizures was limited due to his inanblity to tolerate but what they could see showed no evidence of new infarct or other intracranial  abnormality)  -prns available  -medical pertinences appreciated     Psychological     -Provider encourages patient to attend all groups.     Sociological     -Provider encourages patient to work with  social work to work on discharge plan.     1647 Patient was on phone with [place] [id number] helpline in [place] when provider walked by telling them he could hang himself with the shirt  he had tied around his waist to hold up his shorts. Provider assured [facility] 2 worker  that he was on inpatient unit and was safe.  Provider asked patient who had shorts on over green [facility] bottoms and top to give staff his civilian clothes and will stay in [facility] for safety. Staff will observe for need for sitter.  Wife says he does this often but has not tried to harm himself- she says that is how he ended up in ER because he called a crisis line. She says they all know him well.               I spent 25 minutes in the professional and overall care of this patient.      Ezio Kilpatrick MD               [1] calcium carbonate, 1,250 mg of calcium carbonate, oral, Daily  levETIRAcetam, 500 mg, oral, BID  risperiDONE, 1 mg, oral, BID     [2]    [3] PRN medications: acetaminophen, alum-mag hydroxide-simeth, hydrOXYzine HCL, magnesium hydroxide, nicotine polacrilex, OLANZapine **OR** OLANZapine, traZODone     1

## 2025-08-09 NOTE — NURSING NOTE
ALLY NOTE     Problem:  Delusions     Behavior:  Pt OOR, observed sitting in the group room with peers watching television. Pt is irritable at times. Delusions noted. Pt spoke on the phone to his wife and became agitated and started yelling.   Group Participation: Attends  Appetite/Meals: Hs snack provided       Interventions:  Hs medications administered per MAR    Response:  Medication compliant without incident.     Plan:  Continue to monitor and assist. Maintain q15 minute rounds for safety.

## 2025-08-09 NOTE — GROUP NOTE
Group Topic: Other   Group Date: 8/9/2025  Start Time: 1330  End Time: 1430  Facilitators: DENVER Mcghee   Department: [facility] REHABTH VIRTUAL    Number of Participants: 8   Group Focus: other Rhyme Out!  Treatment Modality: Other: Recreation Therapy  Interventions utilized were mental fitness  Purpose: other: fun, elevate mood, increase socialization, enhance self esteem, stimulate memory    Name: Ellis Pollack YOB: 1960   MR: [id number]      Facilitator: Recreational Therapist  Level of Participation: did not attend  Quality of Participation: did not attend  Interactions with others: did not attend  Mood/Affect: did not attend  Triggers (if applicable): n/a  Cognition: did not attend  Progress: None  Comments: pt problem is delusional thought.  Pt is walking around the unit, refuses to attend group and continues to walk around.  No handout to offer.  Plan: continue with services

## 2025-08-09 NOTE — GROUP NOTE
Group Topic: Coping Skills   Group Date: 8/9/2025  Start Time: 1015  End Time: 1110  Facilitators: DENVER Mcghee   Department: [facility] REHABTH VIRTUAL    Number of Participants: 7   Group Focus: other Coping Skills Cards  Treatment Modality: Other: Recreation Therapy  Interventions utilized were exploration, group exercise, patient education, problem solving, and support  Purpose: coping skills, maladaptive thinking, feelings, irrational fears, communication skills, insight or knowledge, self-worth, self-care, relapse prevention strategies, and trigger / craving management    Name: Ellis Pollack YOB: 1960   MR: [id number]      Facilitator: Recreational Therapist  Level of Participation: did not attend  Quality of Participation: did not attend  Interactions with others: did not attend  Mood/Affect: did not attend  Triggers (if applicable): n/a  Cognition: did not attend  Progress: None  Comments: pt problem is delusional thought.,  Pt is resting in his room.  No handout to offer.  Plan: continue with services

## 2025-08-09 NOTE — CARE PLAN
The patient's goals for the shift include Maintain safety    The clinical goals for the shift include To attend unit programing    Recommendations to address these barriers include pt encouraged to wear non-slip socks when ambulating around unit to minimize slips and falls.

## 2025-08-09 NOTE — GROUP NOTE
Group Topic: Reflection   Group Date: 8/9/2025  Start Time: 0945  End Time: 1015  Facilitators: BONNIE McgheeS   Department: [facility] REHABTH VIRTUAL    Number of Participants: 7   Group Focus: other Getting To Know You  Treatment Modality: Other: Recreation Therapy  Interventions utilized were exploration, reminiscence, and story telling  Purpose: other: fun, elevate mood, increase socialization    Name: Ellis Pollack YOB: 1960   MR: [id number]      Facilitator: Recreational Therapist  Level of Participation: moderate  Quality of Participation: appropriate/pleasant, attentive, and cooperative  Interactions with others: appropriate and gave feedback  Mood/Affect: appropriate  Triggers (if applicable): n/a  Cognition: loose  Progress: Minimal  Comments: pt problem is paranoia.  Pt joins group with little encouragement.  Mumbling and rambling with speech during group.  Speech is difficult to hear so CTRS is unclear if its related to group topic.  Pt exits group right before the second group begins.    Plan: continue with services

## 2025-08-09 NOTE — GROUP NOTE
Group Topic: Goals   Group Date: 8/9/2025  Start Time: 0730  End Time: 0800  Facilitators: Alfonso Glover   Department: [facility] Geriatric     Number of Participants: 6   Group Focus: goals  Treatment Modality: Patient-Centered Therapy  Interventions utilized were assignment  Purpose: self-care    Name: Ellis Pollack YOB: 1960   MR: [id number]      Facilitator: Mental Health PCNA  Level of Participation: minimal  Quality of Participation: appropriate/pleasant  Interactions with others: appropriate  Mood/Affect: appropriate  Triggers (if applicable): n/a  Cognition: coherent/clear  Progress: Moderate  Comments: pt worked on assignment  Plan: continue with services

## 2025-08-10 ASSESSMENT — PAIN SCALES - GENERAL
PAINLEVEL_OUTOF10: 0 - NO PAIN
PAINLEVEL_OUTOF10: 0 - NO PAIN

## 2025-08-10 ASSESSMENT — PAIN - FUNCTIONAL ASSESSMENT
PAIN_FUNCTIONAL_ASSESSMENT: 0-10
PAIN_FUNCTIONAL_ASSESSMENT: 0-10

## 2025-08-10 NOTE — GROUP NOTE
Group Topic: Goals   Group Date: 8/10/2025  Start Time: 0730  End Time: 0800  Facilitators: Alfonso Glover   Department: [facility] Geriatric     Number of Participants: 4   Group Focus: goals  Treatment Modality: Patient-Centered Therapy  Interventions utilized were assignment  Purpose: self-care    Name: Ellis Pollack YOB: 1960   MR: [id number]      Facilitator: Mental Health PCNA  Level of Participation: did not attend  Quality of Participation:  did not attend  Interactions with others:  did not attend  Mood/Affect:  did not attend  Triggers (if applicable):  did not attend  Cognition:  did not attend  Progress: None  Comments:  did not attend  Plan: continue with services

## 2025-08-10 NOTE — GROUP NOTE
Group Topic: Other   Group Date: 8/10/2025  Start Time: 1330  End Time: 1430  Facilitators: DENVER Mcghee   Department: [facility] REHABTH VIRTUAL    Number of Participants: 11   Group Focus: other Tri-Bond Game  Treatment Modality: Other: Recreation Therapy  Interventions utilized were mental fitness  Purpose: other: fun, elevate mood, increase socialization    Name: Ellis Pollack YOB: 1960   MR: [id number]      Facilitator: Recreational Therapist  Level of Participation: minimal  Quality of Participation: passive and quiet  Interactions with others: mostly passive  Mood/Affect: passive  Triggers (if applicable): n/a  Cognition: passive  Progress: Minimal  Comments: pt problem is delusional thought.  Plan: continue with services

## 2025-08-10 NOTE — CARE PLAN
The patient's goals for the shift include Maintain safety    The clinical goals for the shift include maintain safety/rest      Problem: Safety - Adult  Goal: Free from fall injury  Outcome: Progressing     Problem: Discharge Planning  Goal: Discharge to home or other facility with appropriate resources  Outcome: Progressing     Problem: Chronic Conditions and Co-morbidities  Goal: Patient's chronic conditions and co-morbidity symptoms are monitored and maintained or improved  Outcome: Progressing     Problem: Nutrition  Goal: Nutrient intake appropriate for maintaining nutritional needs  Outcome: Progressing

## 2025-08-10 NOTE — GROUP NOTE
Group Topic: Self Esteem   Group Date: 8/10/2025  Start Time: 1030  End Time: 1130  Facilitators: DENVER Mcghee   Department: [facility] REHABTH VIRTUAL    Number of Participants: 9   Group Focus: other Self Esteem Cards  Treatment Modality: Other: Recreation Therapy  Interventions utilized were exploration, group exercise, patient education, problem solving, and support  Purpose: coping skills, maladaptive thinking, feelings, irrational fears, communication skills, insight or knowledge, self-worth, self-care, relapse prevention strategies, and trigger / craving management    Name: Ellis Pollack YOB: 1960   MR: [id number]      Facilitator: Recreational Therapist  Level of Participation: minimal  Quality of Participation: distractible  Interactions with others: gave feedback  Mood/Affect: flat  Triggers (if applicable): n/a  Cognition: confused  Progress: Minimal  Comments: pt problem is delusional thought.  Pt continues to have difficulty understanding/grasping group topic.   Makes comments unrelated to questions asked.  Appears confused.   Plan: continue with services

## 2025-08-10 NOTE — PROGRESS NOTES
"Ellis Pollack is a 64 y.o. male on day 5 of admission presenting with Atypical psychosis.      Subjective   ***       Objective     Last Recorded Vitals  Blood pressure 115/83, pulse 80, temperature 36.6 °C (97.9 °F), temperature source Temporal, resp. rate 16, height 1.702 m (5' 7\"), weight 61.2 kg (135 lb), SpO2 96%.         Sleep Log  Estimated \"Sleeping\" Durations   Night Est. Hours   08/05 8.00-8.75   08/06 8.50-9.25   08/07 6.75-7.50   08/08 7.75-8.75   08/09 9.25-9.75        Review of Systems    Psychiatric ROS - Adult  Anxiety: {Anxiety:90598001}  Depression: {Depression:33866322}  Delirium: {Delirium:81119215}  Psychosis: {Psychosis:75679235}  Elizabeth: {Elizabeth:16787948}  Safety Issues: {Safety Issues:98472399}  Psychiatric ROS Comment: ***    Physical Exam      Mental Status Exam  General: ***  Appearance: ***  Attitude: ***  Behavior: ***  Motor Activity: ***  Speech: ***  Mood: ***  Affect: ***  Thought Process: ***  Thought Content: ***  Thought Perception: ***  Cognition: ***  Insight: ***  Judgement: ***    Psychiatric Risk Assessment  Violence Risk Assessment: {Violence Risk Assessment:43849}  Acute Risk of Harm to Others is Considered: {Low/ModHigh:34434}   Suicide Risk Assessment: {Suicide Risk Assessment:58034}  Protective Factors against Suicide: {Protective Factors:42179}  Acute Risk of Harm to Self is Considered: {Low/ModHigh:21599}    Relevant Results  {If you would like to pull in Medications, type .meds     If you would like to pull in Lab results for the last 24 hours, type .[id number]    If you would like to pull in Imaging results, type .imgrslt :99}    {Link to Stroke Scoring tools - Link :99}       Assessment & Plan  Atypical psychosis    Seizure disorder (Multi)    ***           {This patient does not have an ACP note on file for this encounter, please fill one out - Advance Care Planning Activity :99}    I spent *** minutes in the professional and overall care of this patient.      Ezio BAUGH" MD [place]       plan.     1647 Patient was on phone with [place] [id number] helpline in [place] when provider walked by telling them he could hang himself with the shirt  he had tied around his waist to hold up his shorts. Provider assured [facility] 2 worker  that he was on inpatient unit and was safe.  Provider asked patient who had shorts on over green [facility] bottoms and top to give staff his civilian clothes and will stay in [facility] for safety. Staff will observe for need for sitter.  Wife says he does this often but has not tried to harm himself- she says that is how he ended up in ER because he called a crisis line. She says they all know him well.               I spent 25 minutes in the professional and overall care of this patient.      Ezio Kilpatrick MD                 [1] calcium carbonate, 1,250 mg of calcium carbonate, oral, Daily  levETIRAcetam, 500 mg, oral, BID  risperiDONE, 1 mg, oral, TID     [2]    [3] PRN medications: acetaminophen, alum-mag hydroxide-simeth, hydrOXYzine HCL, magnesium hydroxide, nicotine polacrilex, OLANZapine **OR** OLANZapine, traZODone

## 2025-08-10 NOTE — CARE PLAN
The patient's goals for the shift include go to groups    The clinical goals for the shift include maintain safety    Over the shift, the patient did make progress toward the following goals. Barriers to progression include understanding. Recommendations to address these barriers include education.    Problem: Safety - Adult  Goal: Free from fall injury  Outcome: Progressing     Problem: Nutrition  Goal: Nutrient intake appropriate for maintaining nutritional needs  Outcome: Progressing

## 2025-08-10 NOTE — GROUP NOTE
Group Topic: Reminiscence   Group Date: 8/10/2025  Start Time: 1000  End Time: 1030  Facilitators: DENVER Mcghee   Department: [facility] REHABTH VIRTUAL    Number of Participants: 9   Group Focus: other My favorite...  Treatment Modality: Other: Recreation Therapy  Interventions utilized were exploration, group exercise, reminiscence, and story telling  Purpose: other: fun, elevate mood, increase socialization, enhance self esteem    Name: Ellis Pollack YOB: 1960   MR: [id number]      Facilitator: Recreational Therapist  Level of Participation: moderate  Quality of Participation: cooperative  Interactions with others: gave feedback  Mood/Affect: appropriate  Triggers (if applicable): n/a  Cognition: confused  Progress: Minimal  Comments: pt problem is delusional thought.  Pt joins group with little encouragement.  Appears to have some difficulty focusing on topic.  Restless, in and out of group a few times also.   Plan: continue with services

## 2025-08-11 ASSESSMENT — PAIN SCALES - GENERAL: PAINLEVEL_OUTOF10: 0 - NO PAIN

## 2025-08-11 ASSESSMENT — PAIN - FUNCTIONAL ASSESSMENT: PAIN_FUNCTIONAL_ASSESSMENT: 0-10

## 2025-08-11 NOTE — PROGRESS NOTES
"Ellis Pollack is a 64 y.o. male on day 6 of admission presenting with Atypical psychosis.      Subjective   Case discussed with team and chart reviewed. Ok sleep and appetite. less irritability. Attending groups.    Patient continues to exhibit periods of confusion and anxiety about how fast the world is going and how he is a victim of it. He called a help line again over the weekend which is baseline behavior for him at home. He is taking the rsiperdal as ordered- will increase risperdal 1mg tid.          Objective     Last Recorded Vitals  Blood pressure 109/69, pulse 77, temperature 36.5 °C (97.7 °F), temperature source Temporal, resp. rate 17, height 1.702 m (5' 7\"), weight 61.2 kg (135 lb), SpO2 99%.         Sleep Log  Estimated \"Sleeping\" Durations   Night Est. Hours   08/05 8.00-8.75   08/06 8.50-9.25   08/07 6.75-7.50   08/08 7.75-8.75   08/09 9.25-9.75   08/10 8.75-9.25   08/11 0.00        Review of Systems    Psychiatric ROS - Adult  Anxiety: General Anxiety Disorder (SAPPHIRE)SAPPHIRE Behaviors: difficult to control worry, excessive anxiety/worry, difficulty concentrating, irritability, restlessness, and sleep disturbance  Depression: anhedonia, concentration, helpless, and irritability  Delirium: negative  Psychosis: delusions and paranoia  Elizabeth: negative  Safety Issues: delusions  Psychiatric ROS Comment: admits to confusion at times  Physical Exam      Mental Status Exam  General: appears older than stated age, mildly disheveled  Appearance: hospital attire  Attitude: cooperative  Behavior: cooperative  Motor Activity: gait steady, no abnormal movements noted  Speech: normal rate and volume  Mood: \"I don't understand why I'm here\", anxious  Affect: mildly constricted, anxious, confused  Thought Process: perseverative, some loose associations, tangential  Thought Content: probable delusions, probable paranoia, deies SI/HI  Thought Perception: denies AH/VH  Cognition: alert and oriented x4  Insight: patient is " aware that he has confusion, he denies mentalhealthissuesnow or in thepast  Judgement: patient is able to participate in medical decision making     Psychiatric Risk Assessment  Violence Risk Assessment: major mental illness, male, substance abuse, and other possible developing dementia  Acute Risk of Harm to Others is Considered: moderate   Suicide Risk Assessment: age > 65 yrs old, , and male  Protective Factors against Suicide: other denies SI/HI, denies past SA  Acute Risk of Harm to Self is Considered: low    Relevant Results           Scheduled medications  Scheduled Medications[1]  Continuous medications  Continuous Medications[2]  PRN medications  PRN Medications[3]   No results found for this or any previous visit (from the past 24 hours).   Assessment & Plan  Atypical psychosis    Seizure disorder (Multi)    Patient is a 63yo male dx with anxiety, psychosis.  He is presenting as suspicious /paranoia, moderately disorganized,though adding onto the political division in the [place].  He says everything is a  conspiracy theory, that he can't believe anything that is said on the news, anything that is in his email. He says he is always feeling frustrated and confused.  He does have a hisptory of mental health issues and does deny that as well though chart review says he is in cousneling twice per month for anxiety.  He lives with his wife who is 62yo.     Biological  - increased risperdal 1mg at tid for psychosis/agitation/anxiety with possible intentof DYER as patient does not appear to be cognizant of need for medication  -cont to assess for dementia(MMSE 24/30)  -consider organic etiology of paranoia.agitation as onset of seizures coincided with change in personality(MRI 7/6/2023 at onset of seizures was limited due to his inanblity to tolerate but what they could see showed no evidence of new infarct or other intracranial abnormality)  -prns available  -medical pertinences appreciated      Psychological     -Provider encourages patient to attend all groups.     Sociological     -Provider encourages patient to work with  social work to work on discharge plan.              I spent 25 minutes in the professional and overall care of this patient.      Lucero Batista, MELVIN-CNS                 [1] calcium carbonate, 1,250 mg of calcium carbonate, oral, Daily  levETIRAcetam, 500 mg, oral, BID  risperiDONE, 1 mg, oral, TID     [2]    [3] PRN medications: acetaminophen, alum-mag hydroxide-simeth, hydrOXYzine HCL, magnesium hydroxide, nicotine polacrilex, OLANZapine **OR** OLANZapine, traZODone

## 2025-08-11 NOTE — NURSING NOTE
"ALLY NOTE     Problem:  Delusions     Behavior:  Pt OOR, ambulating throughout hallways. Initially, at the beginning of the shift, Pt was irritable and grumpy. Upset that there was not a telephone available for him to use right when he asked. \"I need to use it, why should I wait? Give me this one then\" while reaching into nursing station for one of the station phones. No delusions noted.    Group Participation: Attends  Appetite/Meals: Hs snack provided.       Interventions:  1:1 time provided- Hs medications administered per MAR    Response:  Pt calmed with interaction and conversation. Compliant with medications. Pt appreciative and told RN to \"have a good night.\"      Plan:  Continue to monitor and assist. Maintain q15 minute rounds for safety.    "

## 2025-08-11 NOTE — GROUP NOTE
Group Topic: Goals   Group Date: 8/10/2025  Start Time: 2010  End Time: 2025  Facilitators: Meseret Yang   Department: [facility] Geriatric     Number of Participants: 15   Group Focus: check in  Treatment Modality: Interpersonal Therapy  Interventions utilized were reminiscence  Purpose: feelings    Name: Ellis Pollack YOB: 1960   MR: [id number]      Facilitator: [facility]  Level of Participation: minimal  Quality of Participation: oppositional  Interactions with others: sarcastic  Mood/Affect: agitated  Triggers (if applicable): none  Cognition: confused  Progress: Minimal  Comments: patient presented with atypical psychosis  Plan: continue with services

## 2025-08-11 NOTE — PROGRESS NOTES
[facility]: [facility]  PROGRESS NOTE      Ellis Pollack is a 64 y.o. male that is being seen  today for follow up at [facility].  Subjective   Pt. Is being seen for follow up at [facility].  Patient has been stable.  Vital signs are stable.  Patient is a pleasantly confused.        ROS  Negative for fever or chills  Negative for sore throat, ear pain, nasal discharge  Negative for cough, shortness of breath or wheezing  Negative for chest pain, palpitations, swelling of legs  Negative for abdominal pain, constipation, diarrhea, blood in the stools  Negative for urinary complaints  Negative for headache, dizziness, weakness or numbness  Negative for joint pain  Positive for dementia and anxiety  All other systems reviewed and were negative   Vitals:    08/11/25 0746   BP: 109/69   Pulse: 77   Resp:    Temp: 36.5 °C (97.7 °F)   SpO2: 99%      Vitals:    08/05/25 2036   Weight: 61.2 kg (135 lb)     Body mass index is 21.14 kg/m².  Physical Exam  Constitutional: Patient does not appear to be in any acute distress  Cardiovascular: RRR, S1/S2, no murmurs, rubs, or gallops, radial pulses +2, no edema of extremities  Pulmonary: CTAB, no respiratory distress.  Abdomen: +BS, soft, non-tender, nondistended, no guarding or rebound, no masses noted  MSK: No joint swelling, normal movements of all extremities. Range of motion- normal.  Skin- No lesions, contusions, or erythema.  Peripheral puslses palpable bilaterally 2+  Neuro: AAO X1-2, Cranial nerves 2-12 grossly intact,DTR 2+ in all 4 limbs       LABS   [unfilled]  Lab Results   Component Value Date    GLUCOSE 91 08/05/2025    CALCIUM 9.0 08/05/2025     08/05/2025    K 3.9 08/05/2025    CO2 27 08/05/2025     (H) 08/05/2025    BUN 17 08/05/2025    CREATININE 0.91 08/05/2025     Lab Results   Component Value Date    ALT 10 08/05/2025    AST 12 08/05/2025    ALKPHOS 49 08/05/2025    BILITOT 0.6 08/05/2025     Lab Results   Component Value Date  "   WBC 9.3 08/05/2025    HGB 14.8 08/05/2025    HCT 43.2 08/05/2025    MCV 94 08/05/2025     08/05/2025     No results found for: \"CHOL\"  No results found for: \"HDL\"  No results found for: \"LDLCALC\"  No results found for: \"TRIG\"  Lab Results   Component Value Date    HGBA1C 5.4 11/22/2021     Other labs not included in the list above were reviewed either before or during this encounter.    History    Medical History[1]  Surgical History[2]  Family History[3]  Allergies[4]  Medications Ordered Prior to Encounter[5]  Immunization History   Administered Date(s) Administered    Moderna SARS-CoV-2 Vaccination 04/10/2021, 05/08/2021, 12/28/2021    Pfizer COVID-19 vaccine, 12 years and older, (30mcg/0.3mL) (Comirnaty) 12/06/2023, 09/29/2024    Pfizer COVID-19 vaccine, bivalent, age 12 years and older (30 mcg/0.3 mL) 12/20/2022    Tdap vaccine, age 7 year and older (BOOSTRIX, ADACEL) 02/22/2024     Patient's medical history was reviewed and updated either before or during this encounter.  ASSESSMENT / PLAN:  Active Hospital Problems    Seizure disorder (Multi)      *Atypical psychosis       Patient is being seen for follow-up at [facility].  Patient has seizure disorder and is on Keppra.  Will continue with the same dose.  Patient's vital signs are stable.  Patient is being seen by [facility] team for atypical psychosis.  Patient is pleasantly confused not able to provide adequate review of systems.      Pierce Murray MD            [1] History reviewed. No pertinent past medical history.  [2] History reviewed. No pertinent surgical history.  [3] No family history on file.  [4] No Known Allergies  [5]   No current facility-administered medications on file prior to encounter.     Current Outpatient Medications on File Prior to Encounter   Medication Sig Dispense Refill    calcium carbonate 500 mg calcium (1,250 mg) chewable tablet Chew and swallow 1 tablet (500 mg of elemental calcium) once daily.      " levETIRAcetam (Keppra) 500 mg tablet Take 1 tablet (500 mg) by mouth 2 times a day. 60 tablet 11    multivitamin tablet Take 1 tablet by mouth once daily.

## 2025-08-11 NOTE — GROUP NOTE
Group Topic: Self-Care/Wellness   Group Date: 8/11/2025  Start Time: 1015  End Time: 1130  Facilitators: DENVER Mcghee   Department: [facility] REHAB[facility] VIRTUAL    Number of Participants: 14   Group Focus: other Positive Steps To Well Being  Treatment Modality: Other: Recreation Therapy  Interventions utilized were exploration, group exercise, patient education, problem solving, and support  Purpose: coping skills, maladaptive thinking, feelings, irrational fears, communication skills, insight or knowledge, self-worth, self-care, relapse prevention strategies, and trigger / craving management    Name: Ellis Pollack YOB: 1960   MR: [id number]      Facilitator: Recreational Therapist  Level of Participation: minimal  Quality of Participation: passive and quiet  Interactions with others: passive  Mood/Affect: passive  Triggers (if applicable): n/a  Cognition: passive  Progress: Minimal  Comments: pt problem is delusional thought.  Pt joins group with little encouragement.  Displays passive participation.   Plan: continue with services

## 2025-08-11 NOTE — GROUP NOTE
Group Topic: Goals   Group Date: 8/11/2025  Start Time: 0730  End Time: 0800  Facilitators: Alfonso Glover   Department: [facility] Geriatric     Number of Participants: 10   Group Focus: goals  Treatment Modality: Patient-Centered Therapy  Interventions utilized were assignment  Purpose: self-care    Name: Ellis Pollack YOB: 1960   MR: [id number]      Facilitator: Mental [facility]  Level of Participation: minimal  Quality of Participation: appropriate/pleasant  Interactions with others: appropriate  Mood/Affect: appropriate  Triggers (if applicable): n/a  Cognition: coherent/clear  Progress: Minimal  Comments: pt attended group and worked on assignment  Plan: continue with services

## 2025-08-11 NOTE — CARE PLAN
The patient's goals for the shift include pt did not state    The clinical goals for the shift include participate in a group activity    Over the shift, the patient did make progress toward the following goals.       Problem: Chronic Conditions and Co-morbidities  Goal: Patient's chronic conditions and co-morbidity symptoms are monitored and maintained or improved  Outcome: Progressing     Problem: Nutrition  Goal: Nutrient intake appropriate for maintaining nutritional needs  Outcome: Progressing     Problem: Fall/Injury  Goal: Not fall by end of shift  Outcome: Progressing     Problem: Fall/Injury  Goal: Be free from injury by end of the shift  Outcome: Progressing

## 2025-08-11 NOTE — PROGRESS NOTES
SOFIA was provided paperwork from the [facility] that pt has a civil commitment hearing on 8/13/25 at 11:30am. SOFIA spoke with Jyoti at the [facility] Board, [phone] and was instructed that this process was started prior to pt being admitted to the hospital by pt's outpatient treatment agency. ALEXW submitted pt's voluntary admission form to the court to notify the court that pt is voluntarily agreeing to treatment. ALEXW served pt the paperwork from the court. Pt became agitated and states that he does not want to attend the hearing. ALEXW instructed pt that he has appointed an  to whom is able to discuss the situation further. ALEXW assisted pt in contacting the 's office and left a message with the . Less than 10 minutes later someone from the 's office called back and informed LSW that the hearing has been cancelled due to pt being voluntary. ALEXW instructed pt of this information. Pt presented confused and required LSW to write for him on who is provider is and when she would meet with him to discuss his treatment. LSW instructed pt that the provider would meet with him tomorrow. ALEXW has not yet received anything from the court with the information about the hearing being cancelled. ALEXW will follow up with the court tomorrow to verify. Pt presented irritable, disorganized, and difficult to process information.     MARGARITA Grace

## 2025-08-11 NOTE — CARE PLAN
The patient's goals for the shift include go to groups    The clinical goals for the shift include maintain safety/rest      Problem: Safety - Adult  Goal: Free from fall injury  Outcome: Progressing     Problem: Discharge Planning  Goal: Discharge to home or other facility with appropriate resources  Outcome: Progressing     Problem: Chronic Conditions and Co-morbidities  Goal: Patient's chronic conditions and co-morbidity symptoms are monitored and maintained or improved  Outcome: Progressing     Problem: Nutrition  Goal: Nutrient intake appropriate for maintaining nutritional needs  Outcome: Progressing

## 2025-08-11 NOTE — NURSING NOTE
ALLY NOTE     Problem:  Delusions     Behavior:  Pt has been out of his room interacting with peers, in the group room.  He is taking scheduled medications and going to group activities.  Group Participation: yes  Appetite/Meals: good       Interventions:  Give scheduled medications and participate in group activities    Response:  Pt out of his room and going to groups and interacting with peers.  Pt expresses delusions when asked regarding the World being out of order.     Plan:  Give scheduled medications   participate in group activities  1:1 interaction with reassurance

## 2025-08-12 ASSESSMENT — PAIN SCALES - GENERAL: PAINLEVEL_OUTOF10: 0 - NO PAIN

## 2025-08-12 ASSESSMENT — PAIN - FUNCTIONAL ASSESSMENT: PAIN_FUNCTIONAL_ASSESSMENT: 0-10

## 2025-08-12 NOTE — NURSING NOTE
ALLY NOTE     Problem:  delusions     Behavior:  Pt has been calm and cooperative with care.  He has not expressed delusions this shift.  He is going to group and taking scheduled medications.  Group Participation: yes  Appetite/Meals: good       Interventions:  Give scheduled medications  Every 15 minute checks  Encourage group participation    Response:  Pt going to groups and taking scheduled medications.     Plan:  Give scheduled medications  Every 15 minute checks  Encourage group participation

## 2025-08-12 NOTE — GROUP NOTE
Group Topic: Medication Education   Group Date: 8/12/2025  Start Time: 1000  End Time: 1055  Facilitators: Ena Stockton PharmD   Department: [facility]    Number of Participants: 10   Group Focus: daily focus and other general medication education  Treatment Modality: Skills Training  Interventions utilized were group exercise, other [employer]opardy Game, and patient education  Purpose: insight or knowledge and other: improved medication compliance     Name: Ellis Pollack YOB: 1960   MR: [id number]      Facilitator: Pharmacist  Level of Participation: minimal  Quality of Participation: distracting to others and side talking  Interactions with others: appropriate  Mood/Affect: agitated and incongruent  Triggers (if applicable): n/a  Cognition: confused, not focused, and processing slowly  Progress: Gaining insight or knowledge  Comments: Ellis Pollack came to the general medication education group late - arriving around 10:40. We played Jeopardy.  We went around the the room choosing categories and each time that Ellis Pollack was called upon he was unable to follow to participate.  Ellis Pollack offered many incongruent statements in group - off topic and hard to understand.  He did always raise his hand to speak.     Plan: continue with services

## 2025-08-12 NOTE — GROUP NOTE
Group Topic: Dialectical Behavioral Therapy - Emotional Regulation   Group Date: 8/12/2025  Start Time: 1100  End Time: 1150  Facilitators: DENVER Mcghee   Department: [facility] REHABTH VIRTUAL    Number of Participants: 12   Group Focus: other Emotion Regulation Worksheet  Treatment Modality: Other: Recreation Therapy  Interventions utilized were exploration, group exercise, patient education, problem solving, and support  Purpose: coping skills, maladaptive thinking, feelings, irrational fears, communication skills, insight or knowledge, self-worth, self-care, relapse prevention strategies, and trigger / craving management    Name: Ellis Pollack YOB: 1960   MR: [id number]      Facilitator: Recreational Therapist  Level of Participation: minimal  Quality of Participation: distractible  Interactions with others: gave feedback  Mood/Affect: flat  Triggers (if applicable): n/a  Cognition: confused and no insight  Progress: Minimal  Comments: pt problem is delusional thought.  Pt joins group with little encouragement.  Displays pleasantly confused behaviors/mood.  Unable to understand group topic and makes comments not related to questions asked.    Plan: continue with services

## 2025-08-12 NOTE — NURSING NOTE
ALLY NOTE     Problem:  Delusions     Behavior:  Patient is in patient’s room laying in bed sleeping. Upon being woken up by this nurse, patient is pleasant and calm. Patient’s affect is blunted. Patient is talkative. Patient maintains brief eye contact.     Group Participation: N/A  Appetite/Meals: N/A       Interventions:  This nurse completed a shift assessment and administered patient's scheduled night time medications.    Response:  Patient remained pleasant and calm and was cooperative throughout this shift assessment and medication administration.     Plan:  Continue to monitor for delusions. Continue to monitor for patient safety.

## 2025-08-12 NOTE — GROUP NOTE
Group Topic: Art Creative   Group Date: 8/12/2025  Start Time: 1330  End Time: 1430  Facilitators: DENVER Mcghee   Department: [facility] REHABTH VIRTUAL    Number of Participants: 10   Group Focus: other Open Art Group  Treatment Modality: Other: Recreation Therapy  Interventions utilized were exploration, story telling, and support  Purpose: feelings and other: fun, elevate mood, increase socialization, enhance self esteem    Name: Ellis Pollack YOB: 1960   MR: [id number]      Facilitator: Recreational Therapist  Level of Participation: active  Quality of Participation: appropriate/pleasant, attentive, and cooperative  Interactions with others: gave feedback  Mood/Affect: appropriate and flat  Triggers (if applicable): n/a  Cognition: confused  Progress: Moderate  Comments: pt problem is delusional thought.  Pt continues to display pleasantly confused and calm behaviors and moods.  Active and focused on painting.  Plan: continue with services

## 2025-08-12 NOTE — GROUP NOTE
Group Topic: Goals   Group Date: 8/12/2025  Start Time: 0730  End Time: 0800  Facilitators: Jyoti Foote   Department: [facility] Geriatric     Number of Participants: 14   Group Focus: goals  Treatment Modality: Individual Therapy  Interventions utilized were assignment  Purpose: feelings and other: goal group    Name: Ellis Pollack YOB: 1960   MR: [id number]      Facilitator: Mental Health PCNA  Level of Participation: active  Quality of Participation: appropriate/pleasant  Interactions with others: appropriate  Mood/Affect: appropriate  Triggers (if applicable):   Cognition: coherent/clear  Progress: Significant  Comments: patient problem atypical psychosis  Plan: continue with services

## 2025-08-12 NOTE — GROUP NOTE
Group Topic: Feeling Awareness/Expression   Group Date: 8/11/2025  Start Time: 2000  End Time: 2015  Facilitators: Meseret Yang   Department: [facility]    Number of Participants: 16   Group Focus: check in  Treatment Modality: Patient-Centered Therapy  Interventions utilized were exploration and reminiscence  Purpose: feelings and self-care    Name: Ellis Pollack YOB: 1960   MR: [id number]      Facilitator: Mental Health PCNA  Level of Participation: did not attend  Quality of Participation: patient was invited did not attend  Interactions with others: isolates, unsocial  Mood/Affect: blunted, closed / guarded, and incongruent  Triggers (if applicable): none  Cognition: confused  Progress: Minimal  Comments: patient presented with atypical psychosis  Plan: continue with services

## 2025-08-12 NOTE — CARE PLAN
The patient's goals for the shift include pt did not state a goal    The clinical goals for the shift include participate in group activities    Over the shift, the patient did make progress toward the following goals.       Problem: Safety - Adult  Goal: Free from fall injury  Outcome: Progressing     Problem: Chronic Conditions and Co-morbidities  Goal: Patient's chronic conditions and co-morbidity symptoms are monitored and maintained or improved  Outcome: Progressing     Problem: Nutrition  Goal: Nutrient intake appropriate for maintaining nutritional needs  Outcome: Progressing     Problem: Fall/Injury  Goal: Not fall by end of shift  Outcome: Progressing     Problem: Fall/Injury  Goal: Be free from injury by end of the shift  Outcome: Progressing

## 2025-08-12 NOTE — PROGRESS NOTES
"Ellis Pollack is a 64 y.o. male on day 7 of admission presenting with Atypical psychosis.      Subjective   Case discussed with team and chart reviewed. Ok sleep and appetite. Substantially less irritability. Attending groups.    Patient continues to exhibit periods of confusion, delusions about the government are less intrusive but still present and anxiety about how fast the world is going and how he is a victim of it. He denies adverse side effects from risperdal 1mg tid- is attending groups.       Objective     Last Recorded Vitals  Blood pressure 118/76, pulse 78, temperature 36.7 °C (98.1 °F), temperature source Temporal, resp. rate 18, height 1.702 m (5' 7\"), weight 61.2 kg (135 lb), SpO2 99%.         Sleep Log  Estimated \"Sleeping\" Durations   Night Est. Hours   08/05 8.00-8.75   08/06 8.50-9.25   08/07 6.75-7.50   08/08 7.75-8.75   08/09 9.25-9.75   08/10 8.75-9.25   08/11 11.00-11.25   08/12 0.00        Review of Systems    Psychiatric ROS - Adult  Anxiety: General Anxiety Disorder (SAPPHIRE)SAPPHIRE Behaviors: difficult to control worry, excessive anxiety/worry, difficulty concentrating, irritability, restlessness, and sleep disturbance  Depression: anhedonia, concentration, helpless, and irritability  Delirium: negative  Psychosis: delusions and paranoia  Elizabeth: negative  Safety Issues: delusions  Psychiatric ROS Comment: admits to confusion at times  Physical Exam      Mental Status Exam  General: appears older than stated age, mildly disheveled  Appearance: hospital attire  Attitude: cooperative  Behavior: cooperative  Motor Activity: gait steady, no abnormal movements noted  Speech: normal rate and volume  Mood: less irritable  Affect: mildly constricted, anxious, confused  Thought Process: perseverative, some loose associations, tangential  Thought Content: probable delusions, probable paranoia, deies SI/HI  Thought Perception: denies AH/VH  Cognition: alert and oriented x4  Insight: patient is aware that he " has confusion, he denies mentalhealthissuesnow or in thepast  Judgement: patient is able to participate in medical decision making     Psychiatric Risk Assessment  Violence Risk Assessment: major mental illness, male, substance abuse, and other possible developing dementia  Acute Risk of Harm to Others is Considered: moderate   Suicide Risk Assessment: age > 65 yrs old, , and male  Protective Factors against Suicide: other denies SI/HI, denies past SA  Acute Risk of Harm to Self is Considered: low    Relevant Results           Scheduled medications  Scheduled Medications[1]  Continuous medications  Continuous Medications[2]  PRN medications  PRN Medications[3]   No results found for this or any previous visit (from the past 24 hours).   Assessment & Plan  Atypical psychosis    Seizure disorder (Multi)    Patient is a 65yo male dx with anxiety, psychosis.  He is presenting as suspicious /paranoia, moderately disorganized,though adding onto the political division in the [place].  He says everything is a  conspiracy theory, that he can't believe anything that is said on the news, anything that is in his email. He says he is always feeling frustrated and confused.  He does have a hisptory of mental health issues and does deny that as well though chart review says he is in cousneling twice per month for anxiety.  He lives with his wife who is 64yo.     Biological  - increased risperdal 1mg at tid for psychosis/agitation/anxiety with possible intent of DYER as patient does not appear to be cognizant of need for medication  -cont to assess for dementia(MMSE 24/30)  -consider organic etiology of paranoia.agitation as onset of seizures coincided with change in personality(MRI 7/6/2023 at onset of seizures was limited due to his inanblity to tolerate but what they could see showed no evidence of new infarct or other intracranial abnormality)  -prns available  -medical pertinences appreciated     Psychological     -Provider  encourages patient to attend all groups.     Sociological     -Provider encourages patient to work with  social work to work on discharge plan.              I spent 25 minutes in the professional and overall care of this patient.      Lucero Btaista, MELVIN-CNS                   [1] calcium carbonate, 1,250 mg of calcium carbonate, oral, Daily  levETIRAcetam, 500 mg, oral, BID  risperiDONE, 1 mg, oral, TID     [2]    [3] PRN medications: acetaminophen, alum-mag hydroxide-simeth, hydrOXYzine HCL, magnesium hydroxide, nicotine polacrilex, OLANZapine **OR** OLANZapine, traZODone

## 2025-08-12 NOTE — GROUP NOTE
Group Topic: Art Therapy   Group Date: 8/11/2025  Start Time: 1340  End Time: 1440  Facilitators: Vesna Hennessy; Trinh Millard   Department: [facility] EXPRESSIVE THER VIRTUAL    Number of Participants: 14   Group Focus: calming/relaxation, expressive outlet, feeling awareness/expression, and growth + development  Treatment Modality: Art Therapy and Music Therapy  Interventions Utilized were: active art engagement, active music engagement, exploration, and sharing/discussion    Patients were given lyrics from various songs, and were prompted to create new songs or poems with those to reflect their current feelings. Pts were then given water color paints and encouraged to expand on prompt through painting related images/ feelings to create final project. Pts then shared their work with their peers and MT/ ATR if comfortable.       Name: Ellis Pollack YOB: 1960   MR: [id number]      Level of Participation: moderate  Quality of Participation: cooperative and drowsy  Interactions with others: appropriate  Mood/Affect: flat  Cognition, Pre Treatment: confused  Cognition, Post Treatment: some insight  Progress: Minimal  Plan: continue with services    Pt struggled to understand the prompt completely, but with support from peers and therapists, was able to paint a thoughtful and symmetrical piece. Pt drooling through most of session.

## 2025-08-13 ASSESSMENT — PAIN SCALES - GENERAL
PAINLEVEL_OUTOF10: 0 - NO PAIN
PAINLEVEL_OUTOF10: 0 - NO PAIN

## 2025-08-13 ASSESSMENT — PAIN - FUNCTIONAL ASSESSMENT: PAIN_FUNCTIONAL_ASSESSMENT: 0-10

## 2025-08-13 NOTE — PROGRESS NOTES
"LSW was approached by pt today in the hallway and was all upset because his nurse was different than the previous day. LSW explained to pt that the nurses do change as they do not work every day and that it can be confusing. Pt responded \"its more than confusing\" and walked away.     LSW attempted to contact pt's wife to obtain collateral and to discuss expectations for after discharge. LSW left a message and requested a return phone call.     Grace Murphy, MARGARITA LSW   "

## 2025-08-13 NOTE — PROGRESS NOTES
[facility]: [facility]  PROGRESS NOTE      Ellis Pollack is a 64 y.o. male that is being seen  today for follow up at [facility].  Subjective   Pt. Is being seen for follow up at [facility].  Patient has been stable.  Vital signs are stable.  Patient is a pleasantly confused.        ROS  Negative for fever or chills  Negative for sore throat, ear pain, nasal discharge  Negative for cough, shortness of breath or wheezing  Negative for chest pain, palpitations, swelling of legs  Negative for abdominal pain, constipation, diarrhea, blood in the stools  Negative for urinary complaints  Negative for headache, dizziness, weakness or numbness  Negative for joint pain  Positive for dementia and anxiety  All other systems reviewed and were negative   Vitals:    08/13/25 0700   BP: 121/82   Pulse: 83   Resp: 16   Temp: 36.6 °C (97.9 °F)   SpO2: 99%      Vitals:    08/05/25 2036   Weight: 61.2 kg (135 lb)     Body mass index is 21.14 kg/m².  Physical Exam  Constitutional: Patient does not appear to be in any acute distress  Cardiovascular: RRR, S1/S2, no murmurs, rubs, or gallops, radial pulses +2, no edema of extremities  Pulmonary: CTAB, no respiratory distress.  Abdomen: +BS, soft, non-tender, nondistended, no guarding or rebound, no masses noted  MSK: No joint swelling, normal movements of all extremities. Range of motion- normal.  Skin- No lesions, contusions, or erythema.  Peripheral puslses palpable bilaterally 2+  Neuro: AAO X1-2, Cranial nerves 2-12 grossly intact,DTR 2+ in all 4 limbs       LABS   [unfilled]  Lab Results   Component Value Date    GLUCOSE 91 08/05/2025    CALCIUM 9.0 08/05/2025     08/05/2025    K 3.9 08/05/2025    CO2 27 08/05/2025     (H) 08/05/2025    BUN 17 08/05/2025    CREATININE 0.91 08/05/2025     Lab Results   Component Value Date    ALT 10 08/05/2025    AST 12 08/05/2025    ALKPHOS 49 08/05/2025    BILITOT 0.6 08/05/2025     Lab Results   Component Value  "Date    WBC 9.3 08/05/2025    HGB 14.8 08/05/2025    HCT 43.2 08/05/2025    MCV 94 08/05/2025     08/05/2025     No results found for: \"CHOL\"  No results found for: \"HDL\"  No results found for: \"LDLCALC\"  No results found for: \"TRIG\"  Lab Results   Component Value Date    HGBA1C 5.4 11/22/2021     Other labs not included in the list above were reviewed either before or during this encounter.    History    Medical History[1]  Surgical History[2]  Family History[3]  Allergies[4]  Medications Ordered Prior to Encounter[5]  Immunization History   Administered Date(s) Administered    Moderna SARS-CoV-2 Vaccination 04/10/2021, 05/08/2021, 12/28/2021    Pfizer COVID-19 vaccine, 12 years and older, (30mcg/0.3mL) (Comirnaty) 12/06/2023, 09/29/2024    Pfizer COVID-19 vaccine, bivalent, age 12 years and older (30 mcg/0.3 mL) 12/20/2022    Tdap vaccine, age 7 year and older (BOOSTRIX, ADACEL) 02/22/2024     Patient's medical history was reviewed and updated either before or during this encounter.  ASSESSMENT / PLAN:  Active Hospital Problems    Seizure disorder (Multi)      *Atypical psychosis       Patient is being seen for follow-up at [facility].  Patient has seizure disorder and is on Keppra.  Will continue with the same dose.  Patient's vital signs are stable.  Patient is being seen by [facility] team for atypical psychosis.  Patient is pleasantly confused not able to provide adequate review of systems.      Pierce Murray MD              [1] History reviewed. No pertinent past medical history.  [2] History reviewed. No pertinent surgical history.  [3] No family history on file.  [4] No Known Allergies  [5]   No current facility-administered medications on file prior to encounter.     Current Outpatient Medications on File Prior to Encounter   Medication Sig Dispense Refill    calcium carbonate 500 mg calcium (1,250 mg) chewable tablet Chew and swallow 1 tablet (500 mg of elemental calcium) once daily.      " levETIRAcetam (Keppra) 500 mg tablet Take 1 tablet (500 mg) by mouth 2 times a day. 60 tablet 11    multivitamin tablet Take 1 tablet by mouth once daily.

## 2025-08-13 NOTE — CARE PLAN
The patient's goals for the shift include No falls    The clinical goals for the shift include maintain safety    Over the shift, the patient did make progress toward the following goals. Barriers to progression include wear yellow safety socks. Recommendations to address these barriers include maintain safety.

## 2025-08-13 NOTE — NURSING NOTE
ALLY NOTE     Problem:  Delusions     Behavior:  Patient is in the group room sitting in a chair and eating a snack. Patient is pleasant and calm. Patient’s affect is blunted. Patient a little talkative. Patient maintains brief eye contact.    Group Participation: N/A  Appetite/Meals: N/A       Interventions:  This nurse completed a shift assessment and administered patient's scheduled night time medications.    Response:  Patient remained pleasant and calm and was cooperative throughout this shift assessment and medication administration.     Plan:  Continue to monitor for delusions. Continue to monitor for patient safety.

## 2025-08-13 NOTE — GROUP NOTE
Group Topic: Goals   Group Date: 8/13/2025  Start Time: 0730  End Time: 0815  Facilitators: Jyoti Foote   Department: [facility] Geriatric     Number of Participants: 9   Group Focus: check in and goals  Treatment Modality: Other: Daily goal setting  Interventions utilized were assignment  Purpose: feelings and other: Morning check in and daily goal setting    Name: Ellis Pollack YOB: 1960   MR: [id number]      Facilitator: Mental Health [facility]  Level of Participation: minimal  Quality of Participation: attentive and engaged  Interactions with others: appropriate  Mood/Affect: appropriate  Cognition: coherent/clear  Progress: Minimal  Comments: Patient problem is atypical psychosis. Patient attended group but did not fill in the activity appropriately.  Plan: continue with services

## 2025-08-13 NOTE — GROUP NOTE
Group Topic: Stress Reduction/Relaxation   Group Date: 8/13/2025  Start Time: 1000  End Time: 1045  Facilitators: DENVRE Mcghee   Department: [facility] REHABTH VIRTUAL    Number of Participants: 10   Group Focus: other The Stress Management Game  Treatment Modality: Other: Recreation Therapy  Interventions utilized were exploration, group exercise, patient education, problem solving, and support  Purpose: coping skills, maladaptive thinking, feelings, irrational fears, communication skills, insight or knowledge, self-worth, self-care, relapse prevention strategies, and trigger / craving management    Name: Ellis Pollack YOB: 1960   MR: [id number]      Facilitator: Recreational Therapist  Level of Participation: active  Quality of Participation: cooperative, distractible, and distracting to others  Interactions with others: pt joins group with little encouragement.  Engages with staff and peers but comments and interaction are not related to group topic. and gave feedback  Mood/Affect: pt is calm  Triggers (if applicable): n/a  Cognition: confused  Progress: Minimal  Comments: pt problem is delusional thought.   Plan: continue with services

## 2025-08-13 NOTE — PROGRESS NOTES
"Ellis Pollack is a 64 y.o. male on day 8 of admission presenting with Atypical psychosis.      Subjective   Chart reviewed and case discussed with multidisciplinary treatment team.    Patient continues to exhibit periods of confusion, and fewer delusions about the government spontaneously contributed today.  Appears withdrawn, mild irritability.  Engagement is met with appearing tired, disengaged.  Answered all asked questions by provider.  Did not endorse benefit of admission nor need for treatment.     Varying levels of group engagement with some description of limited focus, irrelevant participation, and departure in short order while group persisted and other group with description that patient is bright, positive, appropriate, capable, insightful with relevant contributions.    Continuing to await family and home community probate involvement to assist with discharge coordination elements.    Social work note today yielded that patient became overly confused by nurses changing by shift.      Objective     Last Recorded Vitals  Blood pressure 121/82, pulse 83, temperature 36.6 °C (97.9 °F), temperature source Temporal, resp. rate 16, height 1.702 m (5' 7\"), weight 61.2 kg (135 lb), SpO2 99%.         Sleep Log  Estimated \"Sleeping\" Durations   Night Est. Hours   08/05 8.00-8.75   08/06 8.50-9.25   08/07 6.75-7.50   08/08 7.75-8.75   08/09 9.25-9.75   08/10 8.75-9.25   08/11 11.00-11.25   08/12 9.25-9.75   08/13 0.00        Review of Systems    Psychiatric ROS - Adult  Anxiety: General Anxiety Disorder (SAPPHIRE)SAPPHIRE Behaviors: difficult to control worry, excessive anxiety/worry, difficulty concentrating, irritability, restlessness, and sleep disturbance  Depression: anhedonia, concentration, helpless, and irritability  Delirium: negative  Psychosis: delusions and paranoia  Elizabeth: negative  Safety Issues: delusions  Psychiatric ROS Comment: admits to confusion at times  Physical Exam      Mental Status Exam  General: " "appears older than stated age, mildly disheveled  Appearance: hospital attire  Attitude: cooperative  Behavior: cooperative  Motor Activity: gait steady, no abnormal movements noted  Speech: reduced rate and volume, poorly enunciated  Mood: \"ok\"  Affect: mildly constricted, anxious, confused, mild irritability  Thought Process: perseverative, some loose associations, tangential  Thought Content: probable delusions, probable paranoia, deies SI/HI  Thought Perception: denies AH/VH  Cognition: alert and oriented x4  Insight: possible baseline of limited, patient is aware that he has confusion, he denies mental health issues now or in the past  Judgement: possible baseline of poor to fair, patient is taking medications, nonagitated     Psychiatric Risk Assessment  Violence Risk Assessment: major mental illness, male, substance abuse, and other possible developing dementia  Acute Risk of Harm to Others is Considered: moderate   Suicide Risk Assessment: age > 65 yrs old, , and male  Protective Factors against Suicide: other denies SI/HI, denies past SA  Acute Risk of Harm to Self is Considered: moderate    Relevant Results           No results found for this or any previous visit (from the past 96 hours).    Assessment & Plan  Atypical psychosis    Seizure disorder (Multi)    Patient is a 65yo male dx with anxiety, psychosis.  He is presenting as suspicious /paranoia, moderately disorganized,though adding onto the political division in the [place].  He says everything is a  conspiracy theory, that he can't believe anything that is said on the news, anything that is in his email. He says he is always feeling frustrated and confused.  He does have a hisptory of mental health issues and does deny that as well though chart review says he is in cousneling twice per month for anxiety.  He lives with his wife who is 62yo.     Biological  - continue risperdal 1mg tid for psychosis/agitation/anxiety with possible intent of DYER " as patient does not appear to be cognizant of need for medication  -cont to assess for dementia(MMSE 24/30); perform MOCA when patient able to participate given concern MMSE is over representing true cognition at this time  -consider organic etiology of paranoia.agitation as onset of seizures coincided with change in personality(MRI 7/6/2023 at onset of seizures was limited due to his inanblity to tolerate but what they could see showed no evidence of new infarct or other intracranial abnormality)  -prns available  -medical pertinences appreciated     Psychological     -Provider encourages patient to attend all groups.     Sociological     -Provider encourages patient to work with  social work to work on discharge plan.          Parts of this chart have been completed using voice recognition software.  Please excuse any errors of transcription.  Despite the medical decision making time stamp, my medical decision making has taken place during the patient's entire visit.  Thought process and reason for plan has been formulated from the time that I saw the patient until the time of disposition and is not specific to one specific moment during their visit and furthermore the medical decision making encompasses the entire chart and not only that represented in this note.    Nutrition/Malnutrition:  The diagnosis of malnutrition has significant impact on risk adjustment, mortality and readmission rates, length of stay and hospital reimbursement.  The below is a representation of nutrition status if evaluated.  If blank, there is no associated malnutrition finding to incorporate per the dietician team:           Goals for discharge include:  Psychiatric condition: to lower acute risk, return to baseline level of functioning, work to identify positive coping skills to manage psychiatric symptoms, allow for reconciliation of medications  Physical condition: increase daily physical activity, demonstrate interest in completing  daily activity, and complete Activities of Daily Living  Social function: identify protective factors and family supports, increase social interactions on the unit with peers and providing staff, and demonstrate appropriate problem solving skills for engaging after care on discharge          I personally spent 20 minutes today providing care for this patient, including multidisciplinary team discussion, preparation, face to face time, documentation and other services such as review of medical records, diagnostic result, patient education, counseling, coordination of care as specified in the encounter.      Jasvir Dhillon, DO

## 2025-08-13 NOTE — GROUP NOTE
Group Topic: Other   Group Date: 8/13/2025  Start Time: 1330  End Time: 1430  Facilitators: DENVER Mcghee   Department: [facility] REHABTH VIRTUAL    Number of Participants: 9   Group Focus: other Can You Name 5?  Treatment Modality: Other: Recreation therapy  Interventions utilized were mental fitness  Purpose: other: fun, elevate mood, increase socialization, enhance self esteem, stimulate memory    Name: Ellis Pollack YOB: 1960   MR: [id number]      Facilitator: Recreational Therapist  Level of Participation: minimal  Quality of Participation: distractible, passive, and quiet  Interactions with others: pt joins group with little encouragement.  Is unable to focus and exits group after a few minutes.    Mood/Affect: restless  Triggers (if applicable): n/a  Cognition: confused  Progress: Minimal  Comments: pt problem is delusional thought.  Plan: continue with services

## 2025-08-13 NOTE — GROUP NOTE
Group Topic: Music Therapy   Group Date: 8/13/2025  Start Time: 1100  End Time: 1150  Facilitators: Vesna Hennessy   Department: [facility] THER VIRTUAL    Number of Participants: 11   Group Focus: communication/socialization, opportunity for choice/control, and relaxation  Treatment Modality: Music Therapy  Interventions Utilized were: other musical game, sharing/discussion, and songwriting/composition    Pts engaged in a musical game which prompted them to ask the person across from them a question when the music stopped. Pts then engaged in writing a song about a place that is peaceful and safe to them, and encouraged to imagine that place with all of their senses on hard days.       Name: Ellis Pollack YOB: 1960   MR: [id number]      Level of Participation: active  Quality of Participation: appropriate/pleasant and cooperative  Interactions with others: appropriate  Mood/Affect: bright and positive  Cognition, Pre Treatment: attentive and capable  Cognition, Post Treatment: attentive, capable, and insightful  Progress: Moderate  Plan: continue with services    Pt able to understand all prompts and made many relevant contributions to the group.

## 2025-08-13 NOTE — PROGRESS NOTES
"Nutrition Follow up Note    Nutrition Assessment      Nutrition History:  Energy Intake: Good > 75 %    Anthropometrics:  Ht: 170.2 cm (5' 7\"), Wt: 61.2 kg (135 lb), BMI: 21.14    Weight Change:  Daily Weight  08/05/25 : 61.2 kg (135 lb)  08/05/25 : 65.8 kg (145 lb)  04/10/25 : 68 kg (150 lb)  03/06/25 : 68.4 kg (150 lb 14.4 oz)  04/09/24 : 61.2 kg (135 lb)  02/22/24 : 64.4 kg (142 lb)  02/14/24 : 94.5 kg (208 lb 4.8 oz) - question accuracy      Weight History / % Weight Change: per wt hx, pt with a 15# (10%) wt loss over ~5 months (3/6-8/5)  Significant Weight Loss: Yes    Nutrition Focused Physical Exam Findings: defer: not available    Nutrition Significant Labs:  Lab Results   Component Value Date    WBC 9.3 08/05/2025    HGB 14.8 08/05/2025    HCT 43.2 08/05/2025     08/05/2025    ALT 10 08/05/2025    AST 12 08/05/2025     08/05/2025    K 3.9 08/05/2025     (H) 08/05/2025    CREATININE 0.91 08/05/2025    BUN 17 08/05/2025    CO2 27 08/05/2025    TSH 1.88 07/10/2023    HGBA1C 5.4 11/22/2021     Nutrition Specific Medications:  Scheduled Medications[1]  Continuous Medications[2]    Dietary Orders (From admission, onward)       Start     Ordered    08/06/25 1011  Oral nutritional supplements  Until discontinued        Question Answer Comment   Deliver with Dinner    Select supplement: Sugar Free Mighty Shake        08/06/25 1011    08/05/25 2034  Adult diet Cardiac; 70 gm fat; 2 - 3 grams Sodium  Diet effective now        Question Answer Comment   Diet type Cardiac    Fat restriction: 70 gm fat    Sodium restriction: 2 - 3 grams Sodium        08/05/25 2037                  Estimated Needs:   Estimated Energy Needs  Total Energy Estimated Needs in 24 hours (kCal): 1841 kCal  Energy Estimated Needs per kg Body Weight in 24 hours (kCal/kg): 30 kCal/kg  Method for Estimating Needs: actual wt    Estimated Protein Needs  Total Protein Estimated Needs in 24 Hours (g): 74 g  Protein Estimated Needs per " kg Body Weight in 24 Hours (g/kg): 1.2 g/kg  Method for Estimating 24 Hour Protein Needs: actual wt    Estimated Fluid Needs  Method for Estimating 24 Hour Fluid Needs: 1 ml/kcal or per MD       Nutrition Diagnosis   Nutrition Diagnosis:  Malnutrition Diagnosis  Patient has Malnutrition Diagnosis: No    Nutrition Diagnosis  Patient has Nutrition Diagnosis: Yes  Diagnosis Status (1): Active  Nutrition Diagnosis 1: Unintended weight loss  Related to (1): decreased ability to consume/tolerate sufficient energy  As Evidenced by (1): 15# (10%) wt loss over ~5 months       Nutrition Interventions/Recommendations   Nutrition Interventions and Recommendations:  Nutrition Prescription: Nutrition prescription for oral nutrition    Nutrition Recommendations:  Individualized Nutrition Prescription Provided for : continue cardiac diet with mighty shake once daily to supplement po intake.    Nutrition Interventions/Goals:   Food and/or Nutrient Delivery Interventions  Interventions: Meals and snacks, Medical food supplement  Meals and Snacks: Mineral-modified diet  Goal: provide as ordered  Medical Food Supplement: Commercial beverage medical food supplement therapy  Goal: mighty shake once daily to provide 200 kcals and 7g protein    Education Documentation  No documentation found.              Nutrition Monitoring and Evaluation   Monitoring/Evaluation:   Food/Nutrient Related History Monitoring  Monitoring and Evaluation Plan: Estimated Energy Intake  Estimated Energy Intake: Energy intake greater or equal to 75% of estimated energy needs    Anthropometric Measurements  Monitoring and Evaluation Plan: Body weight  Body Weight: Body weight - Maintain stable weight    Goal Status: Some progress toward goal(s)    Follow Up  Time Spent (min): 20 minutes  Last Date of Nutrition Visit: 08/13/25  Nutrition Follow-Up Needed?: 7-10 days  Follow up Comment: 8/20-8/22          [1] calcium carbonate, 1,250 mg of calcium carbonate, oral,  Daily  levETIRAcetam, 500 mg, oral, BID  risperiDONE, 1 mg, oral, TID     [2]

## 2025-08-14 ASSESSMENT — PAIN - FUNCTIONAL ASSESSMENT: PAIN_FUNCTIONAL_ASSESSMENT: 0-10

## 2025-08-14 ASSESSMENT — PAIN SCALES - GENERAL
PAINLEVEL_OUTOF10: 0 - NO PAIN
PAINLEVEL_OUTOF10: 0 - NO PAIN

## 2025-08-14 NOTE — NURSING NOTE
ALLY NOTE     Problem:  Delusions     Behavior:  Pt OOR in milieu this evening. Quiet, keeping to self while sitting in group room watching tv. Pleasant with interaction. Compliant with meds, cooperative with care. No delusions voiced.    Group Participation: n/a  Appetite/Meals: HS snack provided     Interventions:  1:1 provided. Evening meds administered per orders. Encouraged pt to make needs known.    Response:  Pt calm, resting in bed at this time.     Plan:  Will continue to monitor behaviors and effectiveness of interventions while maintaining pt safety.

## 2025-08-14 NOTE — GROUP NOTE
Group Topic: Goals   Group Date: 8/13/2025  Start Time: 2010  End Time: 2025  Facilitators: Meseret Yang   Department: [facility] Geriatric     Number of Participants: 11   Group Focus: check in  Treatment Modality: Interpersonal Therapy  Interventions utilized were reminiscence  Purpose: self-worth    Name: Ellis Pollack YOB: 1960   MR: [id number]      Facilitator: Mental Health PCNA  Level of Participation: minimal  Quality of Participation: appropriate/pleasant  Interactions with others: none  Mood/Affect: blunted and incongruent  Triggers (if applicable): none  Cognition: confused and rigid  Progress: Minimal  Comments: patient presented with Atypical psychosis  Plan: continue with services

## 2025-08-14 NOTE — GROUP NOTE
Group Topic: Other   Group Date: 8/14/2025  Start Time: 1340  End Time: 1440  Facilitators: DENVER Mcghee   Department: [facility] REHABTH [facility]    Number of Participants: 8   Group Focus: other Mind Jogger/Card Game  Treatment Modality: Other: Recreation Therapy  Interventions utilized were group exercise and mental fitness  Purpose: other: fun, elevate mood, increase soialization    Name: Ellis Pollack YOB: 1960   MR: [id number]      Facilitator: Recreational Therapist  Level of Participation: minimal  Quality of Participation: passive  Interactions with others: pt joins group with about 10 minutes remaining.  Displays passive participation.    Mood/Affect: flat  Triggers (if applicable): n/a  Cognition: passive  Progress: Minimal  Comments: pt problem is delusional thought.    Plan: continue with services

## 2025-08-14 NOTE — GROUP NOTE
Group Topic: Goals   Group Date: 8/14/2025  Start Time: 0730  End Time: 0815  Facilitators: Jyoti Foote   Department: [facility] Geriatric     Number of Participants: 8   Group Focus: check in and goals  Treatment Modality: Other: Morning check in and goal setting  Interventions utilized were assignment  Purpose: other: Daily goal setting and check in    Name: Ellis Pollack YOB: 1960   MR: [id number]      Facilitator: Mental Health PCNA  Level of Participation: moderate  Quality of Participation: appropriate/pleasant, attentive, cooperative, and engaged  Interactions with others: appropriate  Mood/Affect: appropriate  Triggers (if applicable): n/a  Cognition: coherent/clear  Progress: Moderate  Comments: Patient problem is psychosis. Patient was active in group.  Plan: continue with services

## 2025-08-14 NOTE — PROGRESS NOTES
"Ellis Pollack is a 64 y.o. male on day 9 of admission presenting with Atypical psychosis.      Subjective   Chart reviewed and case discussed with multidisciplinary treatment team.    More irritable today.  Seen standing at the nursing station yelling at staff.  Patient appeared to be perceiving that individuals are interacting with him at this time though nobody had been prior to his yelling episode.  Patient including the word \"suicide\" in this episode whether he was referring to his own suicidality or encouraging others to act in a suicidal fashion is not clear.  Later in the afternoon, provider again approached patient for discussion and he greeted provider in a more friendly manner with no irritability.  Continuing to work on setting admission goals and optimal discharge arrangements for patient prior to return to the community.  Some evolution thus far since admission with decreased irritability.  Will benefit from structured, monitored, medical setting as discharge location.    Group notes describe patient as confused, unfocused, with poor insight/judgement.  Speaking off topic with accusatory statements.      Objective     Last Recorded Vitals  Blood pressure 107/70, pulse 78, temperature 37 °C (98.6 °F), temperature source Temporal, resp. rate 16, height 1.702 m (5' 7\"), weight 65.2 kg (143 lb 11.8 oz), SpO2 97%.         Sleep Log  Estimated \"Sleeping\" Durations   Night Est. Hours   08/05 8.00-8.75   08/06 8.50-9.25   08/07 6.75-7.50   08/08 7.75-8.75   08/09 9.25-9.75   08/10 8.75-9.25   08/11 11.00-11.25   08/12 9.25-9.75   08/13 9.00-9.75   08/14 0.00        Review of Systems    Psychiatric ROS - Adult  Anxiety: General Anxiety Disorder (SAPPHIRE)SAPPHIRE Behaviors: difficult to control worry, excessive anxiety/worry, difficulty concentrating, irritability, restlessness, and sleep disturbance  Depression: anhedonia, concentration, helpless, and irritability  Delirium: negative  Psychosis: delusions and " unchanged "paranoia  Elizabeth: negative  Safety Issues: delusions  Psychiatric ROS Comment: admits to confusion at times  Physical Exam      Mental Status Exam  General: appears older than stated age, mildly disheveled  Appearance: hospital attire  Attitude: cooperative  Behavior: cooperative  Motor Activity: gait steady, no abnormal movements noted  Speech: reduced rate and volume, poorly enunciated at times, yelling at other times  Mood: \"ok\"  Affect: mildly constricted, anxious, confused, mild irritability  Thought Process: perseverative, some loose associations, tangential  Thought Content: probable delusions, probable paranoia, deies SI/HI  Thought Perception: denies AH/VH  Cognition: alert and oriented x4  Insight: possible baseline of limited, patient is aware that he has confusion, he denies mental health issues now or in the past  Judgement: possible baseline of poor to fair, patient is taking medications, nonagitated     Psychiatric Risk Assessment  Violence Risk Assessment: major mental illness, male, substance abuse, and other possible developing dementia  Acute Risk of Harm to Others is Considered: moderate   Suicide Risk Assessment: age > 65 yrs old, , and male  Protective Factors against Suicide: other denies SI/HI, denies past SA  Acute Risk of Harm to Self is Considered: moderate    Relevant Results           No results found for this or any previous visit (from the past 96 hours).    Assessment & Plan  Atypical psychosis    Seizure disorder (Multi)    Patient is a 63yo male dx with anxiety, psychosis.  He is presenting as suspicious /paranoia, moderately disorganized,though adding onto the political division in the [place].  He says everything is a  conspiracy theory, that he can't believe anything that is said on the news, anything that is in his email. He says he is always feeling frustrated and confused.  He does have a hisptory of mental health issues and does deny that as well though chart review says " he is in cousneling twice per month for anxiety.  He lives with his wife who is 64yo.     Biological  - continue risperdal 1mg tid for psychosis/agitation/anxiety with possible intent of DYER as patient does not appear to be cognizant of need for medication  -cont to assess for dementia(MMSE 24/30); perform MOCA when patient able to participate given concern MMSE is over representing true cognition at this time  -consider organic etiology of paranoia.agitation as onset of seizures coincided with change in personality(MRI 7/6/2023 at onset of seizures was limited due to his inanblity to tolerate but what they could see showed no evidence of new infarct or other intracranial abnormality)  -prns available  -medical pertinences appreciated     Psychological     -Provider encourages patient to attend all groups.     Sociological     -Provider encourages patient to work with  social work to work on discharge plan.          Parts of this chart have been completed using voice recognition software.  Please excuse any errors of transcription.  Despite the medical decision making time stamp, my medical decision making has taken place during the patient's entire visit.  Thought process and reason for plan has been formulated from the time that I saw the patient until the time of disposition and is not specific to one specific moment during their visit and furthermore the medical decision making encompasses the entire chart and not only that represented in this note.    Nutrition/Malnutrition:  The diagnosis of malnutrition has significant impact on risk adjustment, mortality and readmission rates, length of stay and hospital reimbursement.  The below is a representation of nutrition status if evaluated.  If blank, there is no associated malnutrition finding to incorporate per the dietician team:                 I personally spent 20 minutes today providing care for this patient, including multidisciplinary team discussion,  preparation, face to face time, documentation and other services such as review of medical records, diagnostic result, patient education, counseling, coordination of care as specified in the encounter.      Jasvir Dhillon, DO

## 2025-08-14 NOTE — CARE PLAN
The patient's goals for the shift include No falls    The clinical goals for the shift include Maintain safety, promote sleep    Over the shift, the patient did make progress toward the following goals. Barriers to progression include to continue to get 7-8 hrs of sleep. Recommendations to address these barriers include maintain at least 8 hrs of sleep everyday.

## 2025-08-14 NOTE — PROGRESS NOTES
" REHAB Therapy Assessment & Treatment    Patient Name: Ellis Pollack  MRN: [id number]  Today's Date: 8/14/2025      Recreation note : pt is alert and oriented x 2-3 with some poor insight/judgement.   Does attend some groups offered daily but often is confused and unfocused.  Makes comments not related to topics and displays some paranoia, stating \"why do people make you do stuff all the time\" and \"why don't people mind there own business\".  Pt is eating well and reports he is sleeping well.  Will continue to encourage pt to attend groups of choice daily.  "

## 2025-08-14 NOTE — GROUP NOTE
Group Topic: Dialectical Behavioral Therapy - Mindfulness   Group Date: 8/14/2025  Start Time: 1030  End Time: 1120  Facilitators: DENVER Mcghee   Department: [facility]    Number of Participants: 7   Group Focus: other Mindfulness/Grounding  Treatment Modality: Other: Recreation Therapy  Interventions utilized were exploration, group exercise, patient education, problem solving, and support  Purpose: coping skills, maladaptive thinking, feelings, irrational fears, communication skills, insight or knowledge, self-worth, self-care, relapse prevention strategies, and trigger / craving management    Name: Ellis Pollack YOB: 1960   MR: [id number]      Facilitator: Recreational Therapist  Level of Participation: minimal  Quality of Participation: restless  Interactions with others: pt joins group with about 10 minutes remaining in session.  Is restless and appears unfocused secondary to confusion level.  Calm with behaviors.  Wanders out of group before group is complete.  Mood/Affect: restless  Triggers (if applicable): n/a  Cognition: confused  Progress: Minimal  Comments: pt problem is delusional thought.   Plan: continue with services

## 2025-08-15 ASSESSMENT — PAIN DESCRIPTION - DESCRIPTORS: DESCRIPTORS: PATIENT UNABLE TO DESCRIBE

## 2025-08-15 ASSESSMENT — PAIN - FUNCTIONAL ASSESSMENT: PAIN_FUNCTIONAL_ASSESSMENT: 0-10

## 2025-08-15 ASSESSMENT — PAIN SCALES - GENERAL
PAINLEVEL_OUTOF10: 0 - NO PAIN
PAINLEVEL_OUTOF10: 0 - NO PAIN

## 2025-08-15 ASSESSMENT — ACTIVITIES OF DAILY LIVING (ADL): EFFECT OF PAIN ON DAILY ACTIVITIES: MINIMAL

## 2025-08-15 NOTE — NURSING NOTE
ALLY NOTE     Problem:  Pt. Is continuing their journey on working to improve their anxiety & depressive Sx.     Behavior:  Pt. Is cooperative w/ Tx. Plan and has been agreeable to talking w/ staff & peers alike. Pt. Has been able to maintain safety. Pt. Has a relatively positive attitude until the afternoon where they become quite frustrated with being here & outsiders not answering his numerous phone calls.  Appetite/Meals: good        Interventions:  Pt. Was offered groups and their scheduled medications.     Response:  Pt. Has been compliant w/ meds, tolerated them well; and did attend the majority of group sessions w/ good results.     Plan:  Pt. will continue to be monitored for changes in mental status & safety on the unit.
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
## 2025-08-15 NOTE — GROUP NOTE
Group Topic: Goals   Group Date: 8/14/2025  Start Time: 2000  End Time: 2015  Facilitators: Meseret Yang   Department: [facility]     Number of Participants: 11   Group Focus: check in  Treatment Modality: Interpersonal Therapy  Interventions utilized were mental fitness  Purpose: feelings and self-worth    Name: Ellis Pollack YOB: 1960   MR: [id number]      Facilitator: [facility]  Level of Participation: minimal  Quality of Participation: withdrawn  Interactions with others: monopolizing  Mood/Affect: agitated  Triggers (if applicable): none  Cognition: confused, not focused, obsessive, and rigid  Progress: Minimal  Comments: patient presented with atypical psychosis  Plan: continue with services

## 2025-08-15 NOTE — GROUP NOTE
Group Topic: Other   Group Date: 8/15/2025  Start Time: 1330  End Time: 1430  Facilitators: BONNIE ColeS   Department: [facility]    Number of Participants: 7   Group Focus: concentration, leisure skills, self-esteem, and social skills  Treatment Modality: Leisure Development and Other: Recreation Therapy  Interventions utilized were group exercise, leisure development, mental fitness, and support  Purpose: Patients engaged in a therapeutic group game of “Xlumena”. Patients take turns creating new four letter words only changing one letter per turn.  This game aims to enhance cognition by ways of concentration and memory stimulation. Also, promotes social stimulation, enhanced mood and following directions.   Name: Ellis Pollack YOB: 1960   MR: [id number]      Facilitator: Recreational Therapist  Level of Participation: active  Quality of Participation: appropriate/pleasant, attentive, cooperative, and engaged  Interactions with others: appropriate  Mood/Affect: appropriate and brightens with interaction  Cognition: coherent/clear  Progress: Moderate  Comments: appropriate participation and behaviors displayed. Minimal prompting needed. Often laughing/joking and appropriate times.  Plan: continue with services

## 2025-08-15 NOTE — PROGRESS NOTES
SOFIA updated Fatmata Mtz  Liaison at [place] and Jyoti [facility] from the [facility] board, regarding pt's disposition and that there is no identified discharge date or an identified plan. More will be determined next week at family meeting with pt's wife. A specific date and time yet to be determined.     MARGARITA Grace

## 2025-08-15 NOTE — PROGRESS NOTES
"LSW met with pt. Upon approach pt was laying in his bed with eyes half open. Pt was unresponsive to his name for several minutes. Pt was able to respond with slowed speech. Pt had both phones in his room. Pt stated he was feeling well but just wanted \"appropriate judgement\". LSW requested nursing to assess pt and vitals were performed and were normal.     MARGARITA GraceW   "

## 2025-08-15 NOTE — PROGRESS NOTES
"Ellis Pollack is a 64 y.o. male on day 10 of admission presenting with Atypical psychosis.      Subjective   Chart reviewed and case discussed with multidisciplinary treatment team.    Has varying presentations throughout the day.  Can be found in bed disengaged withdrawn and difficult to awaken for interaction.  At other times is seen ambulating to the nursing station requesting the phone.  Other times offering illogical disorganized speech while standing appearing purposeless.  On approach for discussion, tone of voice sounded irritable and frustrated though unclear statements made related to why was irritable when asked if was.  Patient described that he had a scheduled meeting of some sort to conduct today although this was not information aware to this provider of any scheduled meeting.  Social work collaborating with the patient's wife to help with optimal discharge plan.  Whether patient presenting with psychosis, emerging dementia, a combination of the 2, or other condition will likely require a longitudinal assessment opportunity in a medically monitored supervised setting like a nursing home.  Patient is not acting dangerously presently not threatening others and not threatening himself.  It is possible that further medication adjustments at this time may not modify the risk picture in the acute phase thus the previous recommendation for medically monitored setting for longitudinal assessments for an extended period of time.      Objective     Last Recorded Vitals  Blood pressure 132/85, pulse 83, temperature 36.6 °C (97.9 °F), temperature source Temporal, resp. rate 18, height 1.702 m (5' 7\"), weight 65.2 kg (143 lb 11.8 oz), SpO2 99%.         Sleep Log  Estimated \"Sleeping\" Durations   Night Est. Hours   08/05 8.00-8.75   08/06 8.50-9.25   08/07 6.75-7.50   08/08 7.75-8.75   08/09 9.25-9.75   08/10 8.75-9.25   08/11 11.00-11.25   08/12 9.25-9.75   08/13 9.00-9.75   08/14 7.25-8.00   08/15 0.00    " "    Review of Systems    Psychiatric ROS - Adult  Anxiety: General Anxiety Disorder (SAPPHIRE)SAPPHIRE Behaviors: difficult to control worry, excessive anxiety/worry, difficulty concentrating, irritability, restlessness, and sleep disturbance  Depression: anhedonia, concentration, helpless, and irritability  Delirium: negative  Psychosis: delusions and paranoia  Elizabeth: negative  Safety Issues: delusions  Psychiatric ROS Comment: admits to confusion at times  Physical Exam      Mental Status Exam  General: appears older than stated age, mildly disheveled  Appearance: hospital attire  Attitude: withdrawn  Behavior: semicooperative  Motor Activity: gait steady, no abnormal movements noted  Speech: reduced rate and volume, poorly enunciated at times, yelling at other times  Mood: \"ok\"  Affect: mildly constricted, anxious, confused, mild irritability  Thought Process: perseverative, some loose associations, tangential  Thought Content: probable delusions, probable paranoia, deies SI/HI  Thought Perception: denies AH/VH  Cognition: alert and oriented x4  Insight: possible baseline of limited, patient is aware that he has confusion, he denies mental health issues now or in the past  Judgement: possible baseline of poor to fair, patient is taking medications, nonagitated     Psychiatric Risk Assessment  Violence Risk Assessment: major mental illness, male, substance abuse, and other possible developing dementia  Acute Risk of Harm to Others is Considered: moderate   Suicide Risk Assessment: age > 65 yrs old, , and male  Protective Factors against Suicide: other denies SI/HI, denies past SA  Acute Risk of Harm to Self is Considered: moderate    Relevant Results           No results found for this or any previous visit (from the past 96 hours).    Assessment & Plan  Atypical psychosis    Seizure disorder (Multi)    Patient is a 63yo male dx with anxiety, psychosis.  He is presenting as suspicious /paranoia, moderately " disorganized,though adding onto the political division in the [place].  He says everything is a  conspiracy theory, that he can't believe anything that is said on the news, anything that is in his email. He says he is always feeling frustrated and confused.  He does have a hisptory of mental health issues and does deny that as well though chart review says he is in cousneling twice per month for anxiety.  He lives with his wife who is 62yo.     Biological  - continue risperdal 1mg tid for psychosis/agitation/anxiety with possible intent of DYER as patient does not appear to be cognizant of need for medication  -cont to assess for dementia(MMSE 24/30); perform MOCA when patient able to participate given concern MMSE is over representing true cognition at this time  -consider organic etiology of paranoia.agitation as onset of seizures coincided with change in personality(MRI 7/6/2023 at onset of seizures was limited due to his inanblity to tolerate but what they could see showed no evidence of new infarct or other intracranial abnormality)  -prns available  -medical pertinences appreciated     Psychological     -Provider encourages patient to attend all groups.     Sociological     -Provider encourages patient to work with  social work to work on discharge plan.          Parts of this chart have been completed using voice recognition software.  Please excuse any errors of transcription.  Despite the medical decision making time stamp, my medical decision making has taken place during the patient's entire visit.  Thought process and reason for plan has been formulated from the time that I saw the patient until the time of disposition and is not specific to one specific moment during their visit and furthermore the medical decision making encompasses the entire chart and not only that represented in this note.    Nutrition/Malnutrition:  The diagnosis of malnutrition has significant impact on risk adjustment, mortality and  readmission rates, length of stay and hospital reimbursement.  The below is a representation of nutrition status if evaluated.  If blank, there is no associated malnutrition finding to incorporate per the dietician team:                 I personally spent 20 minutes today providing care for this patient, including multidisciplinary team discussion, preparation, face to face time, documentation and other services such as review of medical records, diagnostic result, patient education, counseling, coordination of care as specified in the encounter.      Jasvir Dhillon, DO                 Leanne Hodge), Cardiology; Internal Medicine; Interventional Cardiology  [facility] Dept of Cardiology  [address] [place] [address]  Phone: [phone]  Fax: [phone]

## 2025-08-15 NOTE — PROGRESS NOTES
[facility]: [facility]  PROGRESS NOTE      Ellis Pollack is a 64 y.o. male that is being seen  today for follow up at [facility].  Subjective   Pt. Is being seen for follow up at [facility].  Patient has been stable.  Vital signs are stable.  Patient is a pleasantly confused.        ROS  Negative for fever or chills  Negative for sore throat, ear pain, nasal discharge  Negative for cough, shortness of breath or wheezing  Negative for chest pain, palpitations, swelling of legs  Negative for abdominal pain, constipation, diarrhea, blood in the stools  Negative for urinary complaints  Negative for headache, dizziness, weakness or numbness  Negative for joint pain  Positive for dementia and anxiety  All other systems reviewed and were negative   Vitals:    08/15/25 0900   BP: 132/85   Pulse: 83   Resp: 18   Temp: 36.6 °C (97.9 °F)   SpO2: 99%      Vitals:    08/13/25 1900   Weight: 65.2 kg (143 lb 11.8 oz)     Body mass index is 22.51 kg/m².  Physical Exam  Constitutional: Patient does not appear to be in any acute distress  Cardiovascular: RRR, S1/S2, no murmurs, rubs, or gallops, radial pulses +2, no edema of extremities  Pulmonary: CTAB, no respiratory distress.  Abdomen: +BS, soft, non-tender, nondistended, no guarding or rebound, no masses noted  MSK: No joint swelling, normal movements of all extremities. Range of motion- normal.  Skin- No lesions, contusions, or erythema.  Peripheral puslses palpable bilaterally 2+  Neuro: AAO X1-2, Cranial nerves 2-12 grossly intact,DTR 2+ in all 4 limbs       LABS   [unfilled]  Lab Results   Component Value Date    GLUCOSE 91 08/05/2025    CALCIUM 9.0 08/05/2025     08/05/2025    K 3.9 08/05/2025    CO2 27 08/05/2025     (H) 08/05/2025    BUN 17 08/05/2025    CREATININE 0.91 08/05/2025     Lab Results   Component Value Date    ALT 10 08/05/2025    AST 12 08/05/2025    ALKPHOS 49 08/05/2025    BILITOT 0.6 08/05/2025     Lab Results   Component  "Value Date    WBC 9.3 08/05/2025    HGB 14.8 08/05/2025    HCT 43.2 08/05/2025    MCV 94 08/05/2025     08/05/2025     No results found for: \"CHOL\"  No results found for: \"HDL\"  No results found for: \"LDLCALC\"  No results found for: \"TRIG\"  Lab Results   Component Value Date    HGBA1C 5.4 11/22/2021     Other labs not included in the list above were reviewed either before or during this encounter.    History    Medical History[1]  Surgical History[2]  Family History[3]  Allergies[4]  Medications Ordered Prior to Encounter[5]  Immunization History   Administered Date(s) Administered    Moderna SARS-CoV-2 Vaccination 04/10/2021, 05/08/2021, 12/28/2021    Pfizer COVID-19 vaccine, 12 years and older, (30mcg/0.3mL) (Comirnaty) 12/06/2023, 09/29/2024    Pfizer COVID-19 vaccine, bivalent, age 12 years and older (30 mcg/0.3 mL) 12/20/2022    Tdap vaccine, age 7 year and older (BOOSTRIX, ADACEL) 02/22/2024     Patient's medical history was reviewed and updated either before or during this encounter.  ASSESSMENT / PLAN:  Active Hospital Problems    Seizure disorder (Multi)      *Atypical psychosis       Patient is being seen for follow-up at [facility].  Patient has seizure disorder and is on Keppra.  Will continue with the same dose.  Patient's vital signs are stable.  Patient is being seen by [facility] team for atypical psychosis.  Patient is pleasantly confused not able to provide adequate review of systems.      Pierce Murray MD                [1] History reviewed. No pertinent past medical history.  [2] History reviewed. No pertinent surgical history.  [3] No family history on file.  [4] No Known Allergies  [5]   No current facility-administered medications on file prior to encounter.     Current Outpatient Medications on File Prior to Encounter   Medication Sig Dispense Refill    calcium carbonate 500 mg calcium (1,250 mg) chewable tablet Chew and swallow 1 tablet (500 mg of elemental calcium) once daily.   "    levETIRAcetam (Keppra) 500 mg tablet Take 1 tablet (500 mg) by mouth 2 times a day. 60 tablet 11    multivitamin tablet Take 1 tablet by mouth once daily.

## 2025-08-15 NOTE — GROUP NOTE
Group Topic: Goals   Group Date: 8/15/2025  Start Time: 0715  End Time: 0745  Facilitators: Zaira Spring LPN   Department: [facility]    Number of Participants: 7   Group Focus: goals  Treatment Modality: Patient-Centered Therapy  Interventions utilized were story telling  Purpose: feelings    Name: Ellis Pollack YOB: 1960   MR: [id number]      Facilitator: Mental Health [facility]  Level of Participation: minimal  Quality of Participation: withdrawn  Interactions with others: monopolizing  Mood/Affect: agitated  Triggers (if applicable): none  Cognition: confused, not focused  Progress: Minimal  Comments: patient presented with atypical psychosis  Plan: continue with services

## 2025-08-15 NOTE — GROUP NOTE
"Group Topic: Coping Skills   Group Date: 8/15/2025  Start Time: 1015  End Time: 1115  Facilitators: Apoorva FALL CTRS   Department: [facility]    Number of Participants: 8   Group Focus: concentration, coping skills, and feeling awareness/expression, self-reflection  Treatment Modality: Leisure Development and Other: Recreation Therapy  Interventions utilized were exploration, group exercise, leisure development, patient education, reminiscence, and support  Purpose: In this therapeutic group patients were prompted to explore feelings and emotions and provide coping skills they utilize during the \"dark and stormy emotions\" to help foster use of prosocial behaviors and become aware of and handle negative behaviors in a more effective manner. Patients were then asked to focus on one specific emotion/feeling they would like to explore, focusing on the progressing of lowest possible level to highest possible level. Patients then engaged in a group game of \"Jenga with questions\" where each block has a question written on it, prompting players to answer a personal question each time they remove a block from the tower, creating a tool for therapeutic discussion and self-reflection during play. This game aims to foster open communication to encourage discussion and sharing of personal thoughts and feelings. Self-awareness which can help individuals reflect on their own perspectives and behaviors and building rapport to help facilitates connection between therapist and client or group members by creating a shared activity     Name: Ellis Pollack YOB: 1960   MR: [id number]      Facilitator: Recreational Therapist  Level of Participation: active  Quality of Participation: appropriate/pleasant, cooperative, and engaged  Interactions with others: appropriate  Mood/Affect: brightens with interaction  Cognition: loose  Progress: Moderate  Comments: pt needs some redirection secondary to " disruptive mumbling. Redirection is effective. Completes and shares worksheet, although with loose associations. Engages well in jenga with questions activity.   Plan: continue with services

## 2025-08-15 NOTE — NURSING NOTE
"ALLY NOTE     Problem:  Delusions     Behavior:  Pt OOR this evening. Using phone appropriately to talk with wife. Calm, pleasant with interaction. Pt states \"I ask her why don't you call me, and she says, why don't you call me?\" Pt asks \"I don't understand, do you?\" Compliant with meds, cooperative with care.   Group Participation: n/a  Appetite/Meals: HS snack provided     Interventions:  1:1 provided. Evening meds administered per orders.    Response:  Pt calm, resting in bed until approximately 0230, then OOR several times, briefly stating something random - \"Pardon me for pardoning you... This wonderful life that's not so wonderful anymore.\" Pt appears mildly irritable, but returns to room without issue. Pt resting in bed intermittently.       Plan:  Will continue to monitor behaviors and effectiveness of interventions while maintaining pt safety.    "

## 2025-08-15 NOTE — CARE PLAN
The patient's goals for the shift include leave    The clinical goals for the shift include Maintain safety, promote sleep    Over the shift, the patient did make progress toward the following goals. Barriers to progression include lack of understanding of reason for being here. Recommendations to address these barriers include education & redirection as needed.      Problem: Safety - Adult  Goal: Free from fall injury  Outcome: Progressing

## 2025-08-16 ASSESSMENT — PAIN - FUNCTIONAL ASSESSMENT: PAIN_FUNCTIONAL_ASSESSMENT: 0-10

## 2025-08-16 ASSESSMENT — PAIN SCALES - GENERAL: PAINLEVEL_OUTOF10: 0 - NO PAIN

## 2025-08-16 NOTE — CARE PLAN
The patient's goals for the shift include go to groups    The clinical goals for the shift include participate in group activities    Over the shift, the patient did make progress toward the following goals.       Problem: Chronic Conditions and Co-morbidities  Goal: Patient's chronic conditions and co-morbidity symptoms are monitored and maintained or improved  Outcome: Progressing

## 2025-08-16 NOTE — GROUP NOTE
Group Topic: Goals   Group Date: 8/15/2025  Start Time: 2010  End Time: 2025  Facilitators: Meseret Yang   Department: [facility]     Number of Participants: 11   Group Focus: check in  Treatment Modality: Interpersonal Therapy  Interventions utilized were reminiscence  Purpose: feelings    Name: Ellis Pollack YOB: 1960   MR: [id number]      Facilitator: Mental Health [facility]  Level of Participation: minimal  Quality of Participation: drowsy  Interactions with others: sarcastic  Mood/Affect: blunted, incongruent, and irritable  Triggers (if applicable): none  Cognition: confused, no insight, and not focused  Progress: Minimal  Comments: patient presented with atypical psychosis  Plan: continue with services

## 2025-08-16 NOTE — NURSING NOTE
"ALLY NOTE     Problem:  Paranoia     Behavior:  Pt has been out of his room most of the shift.  Pt is appropriate in his responses at times and at times disorganized.  Pt has been calm and cooperative taking his scheduled medications and attending some of the group activities.  Group Participation: partial  Appetite/Meals: good       Interventions:  Give scheduled medications  Every 15 minute checks  Encourage group participation    Response:  Pt has been calm and cooperative with care.  He is interacting with peers and watching tv in the group room. This patient came to the nurses station just before dinner and put his head in his hands stating, \"Oh no it's the Government again.\"     Plan:  Reorient patient to reality  Give scheduled medications  Every 15 minute checks  Encourage group participation      "

## 2025-08-16 NOTE — GROUP NOTE
Group Topic: Open Recreation   Group Date: 8/16/2025  Start Time: 1400  End Time: 1500  Facilitators: BONNIE ColeS   Department: [facility]    Number of Participants: 8   Group Focus: leisure skills, relaxation, self-esteem, and social skills  Treatment Modality: Leisure Development and Other: Recreation Therapy  Interventions utilized were group exercise, leisure development, mental fitness, and support  Purpose: Patients were encouraged to participate in group room for open Recreation. This group provides the opportunity for patients to engage in a variety of activities designed to promote increased social interaction, physical activity, relaxation and mental fitness tailored to their individual needs and interest, promoting positive feelings and motivation.       Name: Ellis Pollack YOB: 1960   MR: [id number]      Facilitator: Recreational Therapist  Level of Participation: minimal  Quality of Participation: appropriate/pleasant and cooperative  Interactions with others: appropriate  Mood/Affect: flat  Cognition: restless  Progress: Minimal  Comments: pt joins the group room for short period of time with minimal interactions.   Plan: continue with services

## 2025-08-16 NOTE — GROUP NOTE
Group Topic: Goals   Group Date: 8/16/2025  Start Time: 0730  End Time: 0815  Facilitators: Jyoti Foote   Department: [facility]     Number of Participants: 11   Group Focus: goals  Treatment Modality: Other: Daily goal setting and morning check in  Interventions utilized were assignment and discussion  Purpose: feelings and other: Goal setting    Name: Ellis Pollack YOB: 1960   MR: [id number]      Facilitator: Mental Health PCNA  Level of Participation: moderate  Quality of Participation: appropriate/pleasant and attentive  Interactions with others: appropriate  Mood/Affect: appropriate  Cognition: coherent/clear  Progress: Moderate  Comments: Patient problem is atypical psychosis. Patient participated in group discussion.  Plan: continue with services

## 2025-08-16 NOTE — CARE PLAN
The patient's goals for the shift include leave    The clinical goals for the shift include maintain safety    Over the shift, the patient did make progress toward the following goals.

## 2025-08-16 NOTE — GROUP NOTE
Group Topic: Self-Care/Wellness   Group Date: 8/16/2025  Start Time: 1030  End Time: 1130  Facilitators: BONNIE ColeS   Department: [facility]    Number of Participants: 7   Group Focus: mindfulness, relaxation, and self-awareness  Treatment Modality: Dialectical Behavioral Therapy, Skills Training, and Other: Recreation Therapy   Interventions utilized were exploration, group exercise, patient education, and support  Purpose: In this therapeutic group patients engaged in mindfulness and relaxation skills including chair yoga exercise/stretching which is utilized to assist in decreasing stress and improve overall mental health. Pt also participated in deep breathing techniques which aims to promote relaxation, self-awareness and helps to decrease stress and anxiety.     Name: Ellis Pollack YOB: 1960   MR: [id number]      Facilitator: Recreational Therapist  Level of Participation: did not attend  Progress: None  Comments: patient declines to attend, despite encouragement. No handout available.   Plan: continue with services

## 2025-08-16 NOTE — NURSING NOTE
ALLY NOTE     Problem:  Delusions     Behavior:  Patient was calm and cooperative. He was observed in the group room watching television and interacting with peers. No complaints made or noted at this time.    Appetite/Meals: HS snacks provided       Interventions:  Administered scheduled medications    Response:  Compliant with care and medications      Plan:  Continue current plan of care

## 2025-08-16 NOTE — PROGRESS NOTES
"Ellis Pollack is a 64 y.o. male on day 11 of admission presenting with Atypical psychosis.      Subjective   Chart reviewed and case discussed with multidisciplinary treatment team.    Persists with varying presentations throughout the day.  Can be found in bed disengaged withdrawn and difficult to awaken for interaction.  At other times is seen ambulating to the nursing station requesting the phone.  Other times offering illogical disorganized speech while standing appearing purposeless.      On approach for discussion, found in bed, perpendicular to bed with legs and head overhanging.  Easily woken.  Limited germane content contributed during encounter.  Expressed being \"okay\" and no other concerns when asked.    Whether patient presenting with psychosis, emerging dementia, a combination of the 2, or other condition will likely require a longitudinal assessment opportunity in a medically monitored supervised setting like a nursing home.  Patient is not acting dangerously presently not threatening others and not threatening himself.  It is possible that further medication adjustments at this time may not modify the risk picture in the acute phase thus the previous recommendation for medically monitored setting for longitudinal assessments for an extended period of time.      Objective     Last Recorded Vitals  Blood pressure 119/73, pulse 76, temperature 36.7 °C (98.1 °F), temperature source Temporal, resp. rate 16, height 1.702 m (5' 7\"), weight 65.2 kg (143 lb 11.8 oz), SpO2 100%.         Sleep Log  Estimated \"Sleeping\" Durations   Night Est. Hours   08/05 8.00-8.75   08/06 8.50-9.25   08/07 6.75-7.50   08/08 7.75-8.75   08/09 9.25-9.75   08/10 8.75-9.25   08/11 11.00-11.25   08/12 9.25-9.75   08/13 9.00-9.75   08/14 7.25-8.00   08/15 8.25   08/16 0.00        Review of Systems    Psychiatric ROS - Adult  Anxiety: General Anxiety Disorder (SAPPHIRE)SAPPHIRE Behaviors: difficult to control worry, excessive anxiety/worry, " "difficulty concentrating, irritability, restlessness, and sleep disturbance  Depression: anhedonia, concentration, helpless, and irritability  Delirium: negative  Psychosis: delusions and paranoia  Elizabeth: negative  Safety Issues: delusions  Psychiatric ROS Comment: admits to confusion at times  Physical Exam      Mental Status Exam  General: appears older than stated age, mildly disheveled  Appearance: hospital attire  Attitude: withdrawn  Behavior: semicooperative  Motor Activity: gait steady, no abnormal movements noted  Speech: reduced rate and volume, poorly enunciated at times, yelling at other times  Mood: \"ok\"  Affect: mildly constricted, anxious, confused, mild irritability  Thought Process: perseverative, some loose associations, tangential  Thought Content: probable delusions, probable paranoia, deies SI/HI  Thought Perception: denies AH/VH  Cognition: alert and oriented x4  Insight: possible baseline of limited, patient is aware that he has confusion, he denies mental health issues now or in the past  Judgement: possible baseline of poor to fair, patient is taking medications, nonagitated     Psychiatric Risk Assessment  Violence Risk Assessment: major mental illness, male, substance abuse, and other possible developing dementia  Acute Risk of Harm to Others is Considered: moderate   Suicide Risk Assessment: age > 65 yrs old, , and male  Protective Factors against Suicide: other denies SI/HI, denies past SA  Acute Risk of Harm to Self is Considered: moderate    Relevant Results           No results found for this or any previous visit (from the past 96 hours).    Assessment & Plan  Atypical psychosis    Seizure disorder (Multi)    Patient is a 63yo male dx with anxiety, psychosis.  He is presenting as suspicious /paranoia, moderately disorganized,though adding onto the political division in the [place].  He says everything is a  conspiracy theory, that he can't believe anything that is said on the " news, anything that is in his email. He says he is always feeling frustrated and confused.  He does have a hisptory of mental health issues and does deny that as well though chart review says he is in cousneling twice per month for anxiety.  He lives with his wife who is 64yo.     Biological  - continue risperdal 1mg tid for psychosis/agitation/anxiety with possible intent of DYER as patient does not appear to be cognizant of need for medication  -cont to assess for dementia(MMSE 24/30); perform MOCA when patient able to participate given concern MMSE is over representing true cognition at this time  -consider organic etiology of paranoia.agitation as onset of seizures coincided with change in personality(MRI 7/6/2023 at onset of seizures was limited due to his inanblity to tolerate but what they could see showed no evidence of new infarct or other intracranial abnormality)  -prns available  -medical pertinences appreciated     Psychological     -Provider encourages patient to attend all groups.     Sociological     -Provider encourages patient to work with  social work to work on discharge plan.          Parts of this chart have been completed using voice recognition software.  Please excuse any errors of transcription.  Despite the medical decision making time stamp, my medical decision making has taken place during the patient's entire visit.  Thought process and reason for plan has been formulated from the time that I saw the patient until the time of disposition and is not specific to one specific moment during their visit and furthermore the medical decision making encompasses the entire chart and not only that represented in this note.    Nutrition/Malnutrition:  The diagnosis of malnutrition has significant impact on risk adjustment, mortality and readmission rates, length of stay and hospital reimbursement.  The below is a representation of nutrition status if evaluated.  If blank, there is no associated  malnutrition finding to incorporate per the dietician team:                 I personally spent 15 minutes today providing care for this patient, including multidisciplinary team discussion, preparation, face to face time, documentation and other services such as review of medical records, diagnostic result, patient education, counseling, coordination of care as specified in the encounter.      Jasvir Dhillon, DO

## 2025-08-17 ASSESSMENT — PAIN SCALES - GENERAL: PAINLEVEL_OUTOF10: 0 - NO PAIN

## 2025-08-17 ASSESSMENT — PAIN - FUNCTIONAL ASSESSMENT: PAIN_FUNCTIONAL_ASSESSMENT: 0-10

## 2025-08-17 NOTE — GROUP NOTE
Group Topic: Gratitude   Group Date: 8/17/2025  Start Time: 1030  End Time: 1115  Facilitators: Apoorva FALL CTRS   Department: [facility]    Number of Participants: 9   Group Focus: mindfulness and self-awareness, reflection, gratitude  Treatment Modality: Dialectical Behavioral Therapy  Interventions utilized were exploration, patient education, reminiscence, and support  Purpose: Patients engaged in therapeutic group activity that focused on reflection and gratitude. Pt were prompted to engage in discussion on gratitude and actively listen to a song relating to topic. Patents then were encouraged to reflect and complete worksheet expressing what they feel gratitude for in their daily lives. This therapeutic group helps to promote the conscious focus on what is positive in life and not focus on the negative and self-critical pieces. The group also discussed benefits of increased positive self-awareness, creating positive emotions, values, strengths, and a greater understanding of others, which can help create healthy relationships in our lives.     Name: Ellis Pollack YOB: 1960   MR: [id number]      Facilitator: Recreational Therapist  Level of Participation: active  Quality of Participation: intrusive and redirectable  Interactions with others: intrusive  Mood/Affect: appropriate  Cognition: confused  Progress: Moderate  Comments: patient often intrusive and interrupting peers and CTRS. Redirection is effective for short period of time. Trouble following along . Responses are off topic.   Plan: continue with services

## 2025-08-17 NOTE — CARE PLAN
The patient's goals for the shift include go to the bathroom    The clinical goals for the shift include Participate in group activities    Over the shift, the patient did make progress toward the following goals.       Problem: Chronic Conditions and Co-morbidities  Goal: Patient's chronic conditions and co-morbidity symptoms are monitored and maintained or improved  Outcome: Progressing     Problem: Fall/Injury  Goal: Not fall by end of shift  Outcome: Progressing

## 2025-08-17 NOTE — NURSING NOTE
ALLY NOTE     Problem:  Delusions     Behavior:  This patient has been calm and cooperative this shift.  He can be disorganized at times.  He has not expressed delusions this shift.  He is able to express his need asking for new linens for his bed and fresh water.  He follows commands. He is taking scheduled medications and going to group activities.  Group Participation: yes  Appetite/Meals: good       Interventions:  Give scheduled medications  Encourage group activities   Every 15 minute checks    Response:  Pt has not expressed delusions     Plan:  Give scheduled medications  Encourage group activities   Every 15 minute checks

## 2025-08-17 NOTE — NURSING NOTE
Pt requested something to help him have a bowel movement.  States he has not had a bowel movement since yesterday morning. He is able to pass gas.  Pt given Milk of magnesia 30 ml at 1512.

## 2025-08-17 NOTE — NURSING NOTE
Reassessment for Milk of Magnesia:    At 1612 pt has not had a bowel movement.  Encouraged pt to drink fluids and increase activity.

## 2025-08-17 NOTE — PROGRESS NOTES
"Ellis Pollack is a 64 y.o. male on day 12 of admission presenting with Atypical psychosis.      Subjective   Chart reviewed and case discussed with nursing team.    Again limited shift day to day.  Charting remaining near identical.  Persists with varying presentations throughout the day.  Can be found in bed disengaged withdrawn and difficult to awaken for interaction.  At other times is seen ambulating to the nursing station requesting the phone.  Other times offering illogical disorganized speech while standing appearing purposeless.      On approach for discussion, found in standing in hallway.  Limited germane content contributed during encounter.  Expressed being \"okay\" and no other concerns when asked.    Seen in group, eating popcorn snack.  Maintaining less disruptive behavior, minimal redirection compared to admission.      Whether patient presenting with psychosis, emerging dementia, a combination of the 2, or other condition will likely require a longitudinal assessment opportunity in a medically monitored supervised setting like a nursing home.  Patient is not acting dangerously presently not threatening others and not threatening himself.  It is possible that further medication adjustments at this time may not modify the risk picture in the acute phase thus the previous recommendation for medically monitored setting for longitudinal assessments for an extended period of time.    Could discharge to such setting at this time once coordinated by team and family.      Objective     Last Recorded Vitals  Blood pressure 123/77, pulse 74, temperature 36.4 °C (97.5 °F), temperature source Temporal, resp. rate 17, height 1.702 m (5' 7\"), weight 65.2 kg (143 lb 11.8 oz), SpO2 95%.         Sleep Log  Estimated \"Sleeping\" Durations   Night Est. Hours   08/05 8.00-8.75   08/06 8.50-9.25   08/07 6.75-7.50   08/08 7.75-8.75   08/09 9.25-9.75   08/10 8.75-9.25   08/11 11.00-11.25   08/12 9.25-9.75   08/13 9.00-9.75 " "  08/14 7.25-8.00   08/15 8.25   08/16 10.00-10.50   08/17 0.00        Review of Systems    Psychiatric ROS - Adult  Anxiety: General Anxiety Disorder (SAPPHIRE)SAPPHIRE Behaviors: difficult to control worry, excessive anxiety/worry, difficulty concentrating, irritability, restlessness, and sleep disturbance  Depression: anhedonia, concentration, helpless, and irritability  Delirium: negative  Psychosis: delusions and paranoia  Elizabeth: negative  Safety Issues: delusions  Psychiatric ROS Comment: admits to confusion at times  Physical Exam      Mental Status Exam  General: appears older than stated age, mildly disheveled  Appearance: hospital attire  Attitude: withdrawn  Behavior: semicooperative  Motor Activity: gait slow, no abnormal movements noted  Speech: reduced rate and volume, poorly enunciated at times, yelling at other times  Mood: \"ok\"  Affect: mildly constricted, anxious, confused, mild irritability  Thought Process: perseverative, some loose associations, tangential  Thought Content: probable delusions, probable paranoia, deies SI/HI  Thought Perception: denies AH/VH  Cognition: alert and oriented x4  Insight: possible baseline of limited, patient is aware that he has confusion, he denies mental health issues now or in the past  Judgement: possible baseline of poor to fair, patient is taking medications, nonagitated     Psychiatric Risk Assessment  Violence Risk Assessment: major mental illness, male, substance abuse, and other possible developing dementia  Acute Risk of Harm to Others is Considered: moderate   Suicide Risk Assessment: age > 65 yrs old, , and male  Protective Factors against Suicide: other denies SI/HI, denies past SA  Acute Risk of Harm to Self is Considered: moderate    Relevant Results           No results found for this or any previous visit (from the past 96 hours).    Assessment & Plan  Atypical psychosis    Seizure disorder (Multi)    Patient is a 65yo male dx with anxiety, " psychosis.  He is presenting as suspicious /paranoia, moderately disorganized,though adding onto the political division in the [place].  He says everything is a  conspiracy theory, that he can't believe anything that is said on the news, anything that is in his email. He says he is always feeling frustrated and confused.  He does have a hisptory of mental health issues and does deny that as well though chart review says he is in cousneling twice per month for anxiety.  He lives with his wife who is 62yo.     Biological  - continue risperdal 1mg tid for psychosis/agitation/anxiety with possible intent of DYER as patient does not appear to be cognizant of need for medication  -cont to assess for dementia(MMSE 24/30); perform MOCA when patient able to participate given concern MMSE is over representing true cognition at this time  -consider organic etiology of paranoia.agitation as onset of seizures coincided with change in personality(MRI 7/6/2023 at onset of seizures was limited due to his inanblity to tolerate but what they could see showed no evidence of new infarct or other intracranial abnormality)  -prns available  -medical pertinences appreciated     Psychological     -Provider encourages patient to attend all groups.     Sociological     -Provider encourages patient to work with  social work to work on discharge plan.          Parts of this chart have been completed using voice recognition software.  Please excuse any errors of transcription.  Despite the medical decision making time stamp, my medical decision making has taken place during the patient's entire visit.  Thought process and reason for plan has been formulated from the time that I saw the patient until the time of disposition and is not specific to one specific moment during their visit and furthermore the medical decision making encompasses the entire chart and not only that represented in this note.    Nutrition/Malnutrition:  The diagnosis of  malnutrition has significant impact on risk adjustment, mortality and readmission rates, length of stay and hospital reimbursement.  The below is a representation of nutrition status if evaluated.  If blank, there is no associated malnutrition finding to incorporate per the dietician team:                 I personally spent 15 minutes today providing care for this patient, including multidisciplinary team discussion, preparation, face to face time, documentation and other services such as review of medical records, diagnostic result, patient education, counseling, coordination of care as specified in the encounter.      Jasvir Dhillon, DO

## 2025-08-17 NOTE — GROUP NOTE
Group Topic: Goals   Group Date: 8/17/2025  Start Time: 0730  End Time: 0800  Facilitators: Will Thomas   Department: [facility] Geriatric     Number of Participants: 12   Group Focus: goals  Treatment Modality: Individual Therapy  Interventions utilized were assignment  Purpose: feelings, insight or knowledge, and self-care    Name: Ellis Pollack YOB: 1960   MR: [id number]      Facilitator: [facility]  Level of Participation: active  Quality of Participation: appropriate/pleasant, attentive, cooperative, and engaged  Interactions with others: appropriate  Mood/Affect: appropriate  Cognition: coherent/clear and goal directed  Progress: Moderate  Comments: patient was able to make progress towards treatment of atypical psychosis  Plan: continue with services

## 2025-08-17 NOTE — GROUP NOTE
Group Topic: Other   Group Date: 8/17/2025  Start Time: 1330  End Time: 1430  Facilitators: Apoorva FALL CTRS   Department: [facility] VIRTUAL    Number of Participants: 7   Group Focus: concentration, other mental fitness, self-esteem, and social skills  Treatment Modality: Leisure Development and Other: Recreation Therapy  Interventions utilized were group exercise, leisure development, and mental fitness  Purpose: Patients engaged in group activity of “Word ”. In this game patients take turns rolling dice to choose letters for each round. Patients unscramble the letters to make as many words as they can, using only the letters provided. This activity aims to enhance cognition by way of concentration and memory stimulation.  Also, promotes social stimulation, enhances mood and increases self-esteem.     Name: Ellis Pollack YOB: 1960   MR: [id number]      Facilitator: Recreational Therapist  Level of Participation: active  Quality of Participation: appropriate/pleasant, attentive, cooperative, and engaged  Interactions with others: appropriate  Mood/Affect: appropriate and brightens with interaction  Cognition: coherent/clear  Progress: Significant  Comments: patient showed consistent engagement and use of appropriate sense of humor.   Plan: continue with services

## 2025-08-18 ASSESSMENT — PAIN - FUNCTIONAL ASSESSMENT
PAIN_FUNCTIONAL_ASSESSMENT: 0-10
PAIN_FUNCTIONAL_ASSESSMENT: 0-10

## 2025-08-18 ASSESSMENT — ACTIVITIES OF DAILY LIVING (ADL): EFFECT OF PAIN ON DAILY ACTIVITIES: MINIMAL

## 2025-08-18 ASSESSMENT — PAIN SCALES - GENERAL
PAINLEVEL_OUTOF10: 0 - NO PAIN
PAINLEVEL_OUTOF10: 0 - NO PAIN

## 2025-08-18 ASSESSMENT — PAIN DESCRIPTION - DESCRIPTORS: DESCRIPTORS: PATIENT UNABLE TO DESCRIBE

## 2025-08-18 NOTE — NURSING NOTE
ALLY NOTE     Problem:  Pt. Is continuing their journey on working to improve their anxiety & depressive Sx.     Behavior:  Pt. Is cooperative w/ Tx. Plan and has been agreeable to talking w/ staff & peers alike. Pt. Has been able to maintain safety. Pt. Gets forgetful in the afternoons & frustrated as to why he's here.  Appetite/Meals: good        Interventions:  Pt. Was offered groups and their scheduled medications.     Response:  Pt. Has been compliant w/ meds, tolerated them well; and did attend the majority of group sessions w/ good results.     Plan:  Pt. will continue to be monitored for changes in mental status & safety on the unit.

## 2025-08-18 NOTE — CARE PLAN
The patient's goals for the shift include No falls    The clinical goals for the shift include maintain safety    Over the shift, the patient is in the process of making progress toward the goals of Maintain Safety and No falls. Barriers to progression include Lack of understanding. Recommendations to address these barriers include Education and Safety rounds.

## 2025-08-18 NOTE — GROUP NOTE
Group Topic: Reflection   Group Date: 8/18/2025  Start Time: 1515  End Time: 1600  Facilitators: DENVER Mcghee   Department: [facility] RE[facility]AB[facility]IRT[facility]    Number of Participants: 5   Group Focus: other [employer] Game  Treatment Modality: Other: Recreation Therapy  Interventions utilized were mental fitness, reminiscence, and story telling  Purpose: other: fun, elevate mood, increase socialization, enhance self esteem    Name: Ellis Pollack YOB: 1960   MR: [id number]      Facilitator: Recreational Therapist  Level of Participation: minimal  Quality of Participation: distractible  Interactions with others: pt joins group with little encouragement but is unable to focus on group questions.  Slightly irritable with mood.  Responses/comments made during group are not related to topic secondary to confusion level.   Mood/Affect: irritable  Triggers (if applicable): n/a  Cognition: not focused  Progress: Minimal  Comments: pt problem is delusional thought.  Plan: continue with services

## 2025-08-18 NOTE — PROGRESS NOTES
[facility]: [facility]  PROGRESS NOTE      Ellis Pollack is a 64 y.o. male that is being seen  today for follow up at [facility].  Subjective   Pt. Is being seen for follow up at [facility].  Patient has been stable.  Vital signs are stable.  Patient is a pleasantly confused.        ROS  Negative for fever or chills  Negative for sore throat, ear pain, nasal discharge  Negative for cough, shortness of breath or wheezing  Negative for chest pain, palpitations, swelling of legs  Negative for abdominal pain, constipation, diarrhea, blood in the stools  Negative for urinary complaints  Negative for headache, dizziness, weakness or numbness  Negative for joint pain  Positive for dementia and anxiety  All other systems reviewed and were negative   Vitals:    08/18/25 0743   BP: 117/70   Pulse: 74   Resp:    Temp: 36 °C (96.8 °F)   SpO2: 92%      Vitals:    08/13/25 1900   Weight: 65.2 kg (143 lb 11.8 oz)     Body mass index is 22.51 kg/m².  Physical Exam  Constitutional: Patient does not appear to be in any acute distress  Cardiovascular: RRR, S1/S2, no murmurs, rubs, or gallops, radial pulses +2, no edema of extremities  Pulmonary: CTAB, no respiratory distress.  Abdomen: +BS, soft, non-tender, nondistended, no guarding or rebound, no masses noted  MSK: No joint swelling, normal movements of all extremities. Range of motion- normal.  Skin- No lesions, contusions, or erythema.  Peripheral puslses palpable bilaterally 2+  Neuro: AAO X1-2, Cranial nerves 2-12 grossly intact,DTR 2+ in all 4 limbs       LABS   [unfilled]  Lab Results   Component Value Date    GLUCOSE 91 08/05/2025    CALCIUM 9.0 08/05/2025     08/05/2025    K 3.9 08/05/2025    CO2 27 08/05/2025     (H) 08/05/2025    BUN 17 08/05/2025    CREATININE 0.91 08/05/2025     Lab Results   Component Value Date    ALT 10 08/05/2025    AST 12 08/05/2025    ALKPHOS 49 08/05/2025    BILITOT 0.6 08/05/2025     Lab Results   Component  "Value Date    WBC 9.3 08/05/2025    HGB 14.8 08/05/2025    HCT 43.2 08/05/2025    MCV 94 08/05/2025     08/05/2025     No results found for: \"CHOL\"  No results found for: \"HDL\"  No results found for: \"LDLCALC\"  No results found for: \"TRIG\"  Lab Results   Component Value Date    HGBA1C 5.4 11/22/2021     Other labs not included in the list above were reviewed either before or during this encounter.    History    Medical History[1]  Surgical History[2]  Family History[3]  Allergies[4]  Medications Ordered Prior to Encounter[5]  Immunization History   Administered Date(s) Administered    Moderna SARS-CoV-2 Vaccination 04/10/2021, 05/08/2021, 12/28/2021    Pfizer COVID-19 vaccine, 12 years and older, (30mcg/0.3mL) (Comirnaty) 12/06/2023, 09/29/2024    Pfizer COVID-19 vaccine, bivalent, age 12 years and older (30 mcg/0.3 mL) 12/20/2022    Tdap vaccine, age 7 year and older (BOOSTRIX, ADACEL) 02/22/2024     Patient's medical history was reviewed and updated either before or during this encounter.  ASSESSMENT / PLAN:  Active Hospital Problems    Seizure disorder (Multi)      *Atypical psychosis       Patient is being seen for follow-up at [facility].  Patient has seizure disorder and is on Keppra.  Will continue with the same dose.  Patient's vital signs are stable.  Patient is being seen by [facility] team for atypical psychosis.  Patient is pleasantly confused not able to provide adequate review of systems.      Pierce Murray MD                  [1] History reviewed. No pertinent past medical history.  [2] History reviewed. No pertinent surgical history.  [3] No family history on file.  [4] No Known Allergies  [5]   No current facility-administered medications on file prior to encounter.     Current Outpatient Medications on File Prior to Encounter   Medication Sig Dispense Refill    calcium carbonate 500 mg calcium (1,250 mg) chewable tablet Chew and swallow 1 tablet (500 mg of elemental calcium) once daily.  "     levETIRAcetam (Keppra) 500 mg tablet Take 1 tablet (500 mg) by mouth 2 times a day. 60 tablet 11    multivitamin tablet Take 1 tablet by mouth once daily.

## 2025-08-18 NOTE — GROUP NOTE
"Group Topic: Music Therapy   Group Date: 8/18/2025  Start Time: 0130  End Time: 0230  Facilitators: Trinh Millard   Department: [facility] EXPRESSIVE THER [facility]IRTUAL    Number of Participants: 9   Group Focus: communication/socialization, expressive outlet, feeling awareness/expression, growth + development, opportunity for choice/control, and rapport building  Treatment Modality: Music Therapy  Interventions Utilized were: active music engagement, empathic listening/validating emotions, passive music engagement, recording, sharing/discussion, and songwriting/composition    MT provided the opportunity to engage in variety of interventions to that address goals of self-expression, choice, and socialization with themes of self-acceptance. Patients engaged in songwriting to build rapport, provide an outlet for self-expression, and socialization. A rosalie analysis was provided to promote self-expression and socialization among group members. Patients engaged in live music listening with opportunities of active music making through instruments to promote choice and self-expression. Patients provided music for songs and sang along with MT to provide an opportunity for choice and self-expression.   Name: Ellis Pollack YOB: 1960   MR: [id number]      Level of Participation: active  Quality of Participation: appropriate/pleasant, attentive, and engaged  Interactions with others: appropriate, supportive, and offered helpful suggestions  Mood/Affect: appropriate and brightens with interaction  Cognition, Pre Treatment: attentive  Cognition, Post Treatment: attentive and capable  Progress: Moderate  Plan: continue with services    Pt participated in group songwriting, rosalie analysis, and active music engagement by contributing to group discussion, playing an egg shaker, and requesting a song. During group rosalie analysis, pt shared that it may take time for one to recognize their inner \"light\" and shared a drawing of the " "ocean with group, expressing that things may get \"afua\" but the sun keeps shining on the ocean.   "

## 2025-08-18 NOTE — NURSING NOTE
ALLY NOTE     Problem:  Delusions     Behavior:  Patient is in the hallway sitting in a rocking and eating a snack. Patient is pleasant and calm. Patient's affect is broad range. Patient is talkative. Patient maintains good eye contact     Group Participation: N/A  Appetite/Meals: N/A       Interventions:  This nurse completed a shift assessment and administered patient's scheduled night time medications.    Response:  Patient remained pleasant and calm and was cooperative throughout this shift assessment and medication administration.     Plan:  Continue to monitor for delusions. Continue to monitor for patient safety.

## 2025-08-18 NOTE — PROGRESS NOTES
"Ellis Pollack is a 64 y.o. male on day 13 of admission presenting with Atypical psychosis.      Subjective   Chart reviewed and case discussed during interdisciplinary rounds.    No overnight concerns from staff. Today pt attending groups, participation minimal. Continues to present as disorganized - we interacted in the hallway after group, his speech was soft and he mumbled, focused on a group project asking \"is this a sex competition?\". PO intake and sleep maintained. Compliant with medications.      Objective   Last Recorded Vitals  Blood pressure 117/70, pulse 74, temperature 36 °C (96.8 °F), temperature source Temporal, resp. rate 17, height 1.702 m (5' 7\"), weight 65.2 kg (143 lb 11.8 oz), SpO2 92%.       Sleep Log  Estimated \"Sleeping\" Durations   Night Est. Hours   08/05 8.00-8.75   08/06 8.50-9.25   08/07 6.75-7.50   08/08 7.75-8.75   08/09 9.25-9.75   08/10 8.75-9.25   08/11 11.00-11.25   08/12 9.25-9.75   08/13 9.00-9.75   08/14 7.25-8.00   08/15 8.25   08/16 10.00-10.50   08/17 8.75-9.50        Review of Systems    Psychiatric ROS - Adult  Anxiety:  PANCHO due to disorganized thought processes  Depression: PANCHO due to disorganized thought processes  Delirium: PANCHO due to disorganized thought processes  Psychosis: delusions continue  Elizabeth: PANCHO due to disorganized thought processes  Safety Issues: PANCHO due to disorganized thought processes  Psychiatric ROS Comment: -    Physical Exam  Vitals and nursing note reviewed.   Pulmonary:      Effort: Pulmonary effort is normal.     Neurological:      Mental Status: He is alert.      Comments: Oriented to himself   He knows he is not at home, unable to state setting he is in  He states he is from [place] and [place]       Mental Status Exam  General: appears older than stated age, mildly disheveled  Appearance: hospital attire  Attitude: pleasant, confused  Behavior: disorganized but cooperative  Motor Activity: gait slow, no abnormal movements " "noted  Speech: reduced rate and volume, poorly enunciated at times   Mood: \"OK\"  Affect: mildly constricted, anxious, confused, mild irritability at times  Thought Process:  disorganized, loose associations, tangential  Thought Content: probable delusions, probable paranoia, when asked he denies SI/HI  Thought Perception: when asked he denies AH/VH  Cognition: alert and oriented to self  Insight: possible baseline of limited, patient is aware that he has confusion, he denies mental health issues now or in the past  Judgement: possible baseline of poor to fair, patient is taking medications, nonagitated     Psychiatric Risk Assessment  Violence Risk Assessment: major mental illness, male, substance abuse, and other possible developing dementia  Acute Risk of Harm to Others is Considered: moderate   Suicide Risk Assessment: age > 65 yrs old, , and male  Protective Factors against Suicide: other denies SI/HI, denies past SA  Acute Risk of Harm to Self is Considered: moderate    Relevant Results           No results found for this or any previous visit (from the past 96 hours).    Assessment & Plan  Atypical psychosis    Seizure disorder (Multi)      Ellis Pollack is a 64 year old M with past psychiatric history of anxiety, psychosis, admitted to [facility] on 8/5/25 with psychotic symptoms.      Whether patient presenting with psychosis, emerging dementia, a combination of the 2, or other organic condition, it will likely require a longitudinal assessment opportunity in a medically monitored supervised setting (like a nursing home).  Patient is not acting dangerously presently, not threatening others, and not threatening himself.  It is possible that further medication adjustments at this time may not modify the risk picture in the acute phase, thus the recommendation for medically monitored setting for longitudinal assessments for an extended period of time. Chart review (limited available content) " shows admission in 2021 for confusion, 2023 for new onset seizure disorder. Admitting MMSE 24/30. Consider organic etiology of paranoia/agitation as onset of seizures coincided with change in personality. Will need follow up outpatient with neurology and would benefit from formal neuropsychiatric testing        Biological  - continue risperdal 1mg tid for psychosis/agitation/anxiety with possible intent of DYER (as patient does not appear to be cognizant of need for medication)  - goal to perform MOCA when patient able to participate, given concern MMSE is over representing true cognition at this time  - prns available  - medical co-managing pertinences  - check vit D, b12, TSH, VDRL  - start daily thiamine and MVI    Psychological  - Provider encourages patient to attend all groups.     Sociological  - Provider encourages patient to work with social work regarding safe discharge plan.        Nutrition/Malnutrition:  The diagnosis of malnutrition has significant impact on risk adjustment, mortality and readmission rates, length of stay and hospital reimbursement.  The below is a representation of nutrition status if evaluated.  If blank, there is no associated malnutrition finding to incorporate per the dietician team:     Goals for discharge include:  Psychiatric condition: to lower acute risk, return to presumed baseline level of functioning, work to identify positive coping skills to manage psychiatric symptoms, allow for reconciliation of medications.  Physical condition: increase daily physical activity, demonstrate interest in completing daily activity, and complete ADL.  Social function: identify protective factors and family supports, increase social interactions on the unit with peers and providing staff, and demonstrate appropriate problem solving skills for engaging after care on discharge.    Total time spent with patient encounter 120 minutes. This includes patient interview, assessment, extensive chart  review, personal review of labs and images as noted above, and formulation of recommendations.     Wes Urena, APRN-CNP, AGPCNP-BC, PMHNP-BC  Psychiatry, [facility]  1* contact: IGIGI preferred

## 2025-08-18 NOTE — GROUP NOTE
Group Topic: Leisure Skills   Group Date: 8/18/2025  Start Time: 1010  End Time: 1110  Facilitators: DENVER Mcghee   Department: [facility]    Number of Participants: 7   Group Focus: other Leisure Brainstorming  Treatment Modality: Other: Recreation Therapy  Interventions utilized were exploration, group exercise, patient education, problem solving, and support  Purpose: coping skills, maladaptive thinking, feelings, irrational fears, communication skills, insight or knowledge, self-worth, self-care, relapse prevention strategies, and trigger / craving management    Name: Ellis Pollack YOB: 1960   MR: [id number]      Facilitator: Recreational Therapist  Level of Participation: moderate  Quality of Participation: disruptive, distractible, and impulsive  Interactions with others: pt joins group with little encouragement.  Was challenging and disruptive at the start of session.  Redirection effective and pt was able to focus on group.  Responses not related to group topic secondary to confusion level.   Mood/Affect: angry and irritable  Triggers (if applicable): n/a  Cognition: confused, no insight, and not focused  Progress: Minimal  Comments: pt problem is delusional thought.  Plan: continue with services

## 2025-08-18 NOTE — CARE PLAN
The patient's goals for the shift include go home    The clinical goals for the shift include maintain safety    Over the shift, the patient did make progress toward the following goals. Barriers to progression include small bouts of confusion. Recommendations to address these barriers include redirection & education as needed.      Problem: Discharge Planning  Goal: Discharge to home or other facility with appropriate resources  Outcome: Progressing

## 2025-08-18 NOTE — NURSING NOTE
ALLY NOTE     Problem:  ***     Behavior:    Patient is in the group room sitting in a chair and eating a snack. Patient is pleasant and calm. Patient’s affect is blunted. Patient is talkative. Patient maintains brief eye contact.     Patient is in the group room sitting in a chair and eating a snack. Patient is pleasant and calm. Patient’s affect is blunted. Patient is talkative. Patient maintains brief eye contact.     Group Participation: N/A  Appetite/Meals: N/A       Interventions:  This nurse completed a shift assessment and administered patient's scheduled night time medications.    Response:  Patient remained pleasant and calm and was cooperative throughout this shift assessment and medication administration.     Plan:  ***

## 2025-08-18 NOTE — GROUP NOTE
Group Topic: Goals   Group Date: 8/18/2025  Start Time: 0730  End Time: 0800  Facilitators: Alfonso Glover   Department: [facility]     Number of Participants: 7   Group Focus: goals  Treatment Modality: Patient-Centered Therapy  Interventions utilized were assignment  Purpose: self-care    Name: Ellis Pollack YOB: 1960   MR: [id number]      Facilitator: Mental [facility]  Level of Participation: did not attend  Quality of Participation: did not attend  Interactions with others: did not attend  Mood/Affect: did not attend  Triggers (if applicable): did not attend  Cognition: did not attend  Progress: None  Comments: did not attend  Plan: continue with services

## 2025-08-18 NOTE — PROGRESS NOTES
SOFIA left a message for pt's wife trying to coordinate a family meeting, hopefully for tomorrow or Wednesday.     Grace Murphy, MARGARITA BLACK

## 2025-08-19 ASSESSMENT — PAIN SCALES - GENERAL
PAINLEVEL_OUTOF10: 0 - NO PAIN
PAINLEVEL_OUTOF10: 0 - NO PAIN

## 2025-08-19 ASSESSMENT — PAIN - FUNCTIONAL ASSESSMENT: PAIN_FUNCTIONAL_ASSESSMENT: 0-10

## 2025-08-19 NOTE — GROUP NOTE
Group Topic: Other   Group Date: 8/19/2025  Start Time: 1330  End Time: 1430  Facilitators: DENVER Mcghee   Department: [facility]    Number of Participants: 9   Group Focus: other Top 5 Game   Treatment Modality: Other: Recreation Therapy  Interventions utilized were group exercise, mental fitness, and reminiscence  Purpose: other: fun, elevate mood, increase socialization, enhance self esteem    Name: Ellis Pollack YOB: 1960   MR: [id number]      Facilitator: Recreational Therapist  Level of Participation: active  Quality of Participation: appropriate/pleasant, distractible, and impulsive  Interactions with others: pt joins group with little encouragement.  Engages easily during group but responses are not related to group game secondary to confusion level. , appropriate, and gave feedback  Mood/Affect: appropriate  Triggers (if applicable): n/a  Cognition: confused  Progress: Minimal  Comments: pt problem is delusional thought.    Plan: continue with services

## 2025-08-19 NOTE — NURSING NOTE
BIRP NOTE     Problem:  Delusions and paranoia     Behavior:  Walking hallway during down time , saying everyone needs an escape tilley.  Group Participation: yes  Appetite/Meals: 100/100       Interventions:  Continue to re orientate and encourage group participation.    Response:  Somewhat effective     Plan:  Continue current plan of care.

## 2025-08-19 NOTE — GROUP NOTE
Group Topic: Self-Care/Wellness   Group Date: 8/19/2025  Start Time: 1115  End Time: 1150  Facilitators: DENVER Mcghee   Department: [facility]    Number of Participants: 10   Group Focus: other Healthy Living Group  Treatment Modality: Other: Recreation Therapy  Interventions utilized were exploration, group exercise, patient education, problem solving, and support  Purpose: coping skills, maladaptive thinking, feelings, irrational fears, communication skills, insight or knowledge, self-worth, self-care, relapse prevention strategies, and trigger / craving management    Name: Ellis Pollack YOB: 1960   MR: [id number]      Facilitator: Recreational Therapist  Level of Participation: minimal  Quality of Participation: distractible and distracting to others  Interactions with others: pt is pleasantly confused.  Comments made during group are not related to group topic at all.    Mood/Affect: appropriate  Triggers (if applicable): n/a  Cognition: confused  Progress: Minimal  Comments: pt problem is delusional thought.  Plan: continue with services

## 2025-08-19 NOTE — GROUP NOTE
Group Topic: Medication Education   Group Date: 8/19/2025  Start Time: 1005  End Time: 1100  Facilitators: Ena Stockton PharmD   Department: [facility] [employer]    Number of Participants: 8   Group Focus: daily focus and other General Medication Education   Treatment Modality: Skills Training  Interventions utilized were group exercise, other Molecular Detection game, and patient education  Purpose: insight or knowledge and other: improved medication compliance     Name: Ellis Pollack YOB: 1960   MR: [id number]      Facilitator: Pharmacist  Level of Participation: did not attend  Quality of Participation: n/a - did not attend   Interactions with others: n/a - did not attend   Mood/Affect: n/a - did not attend   Triggers (if applicable): n/a  Cognition: n/a - did not attend   Progress: None  Comments: Did not attend medication education group  Plan: continue with services

## 2025-08-19 NOTE — CARE PLAN
The patient's goals for the shift include group    The clinical goals for the shift include maintain safety    Over the shift, the patient did not make progress toward the following goals. Barriers to progression include n/a. Recommendations to address these barriers include pt attended group.

## 2025-08-19 NOTE — CARE PLAN
The patient's goals for the shift include Sleep    The clinical goals for the shift include maintain safety    Over the shift, the patient did make progress toward the following goals.

## 2025-08-19 NOTE — GROUP NOTE
Group Topic: Goals   Group Date: 8/19/2025  Start Time: 0730  End Time: 0800  Facilitators: Jyoti Foote   Department: [facility]     Number of Participants: 7   Group Focus: goals  Treatment Modality: Other: Daily goal setting  Interventions utilized were assignment  Purpose: other: Goal setting    Name: Ellis Pollack YOB: 1960   MR: [id number]      Facilitator: Mental Health [facility]  Level of Participation: active  Quality of Participation: appropriate/pleasant, attentive, cooperative, and engaged  Interactions with others: appropriate  Mood/Affect: appropriate  Cognition: coherent/clear  Progress: Moderate  Comments: Patient problem is psychosis. Patient was active and engaged during group.  Plan: continue with services

## 2025-08-19 NOTE — PROGRESS NOTES
"Ellis Pollack is a 64 y.o. male on day 14 of admission presenting with Atypical psychosis.      Subjective   Chart reviewed and case discussed during interdisciplinary rounds.    No overnight concerns from staff. Today pt attending groups, participation minimal and inappropriate to topic.  Continues to present as disorganized, responses to questions today are tangential. Observed him talking to himself walking in hallway. \"I told them about the paint that I fixed and they reported it to the chief.\"       Objective   Last Recorded Vitals  Blood pressure 109/73, pulse 78, temperature 36.8 °C (98.2 °F), temperature source Temporal, resp. rate 16, height 1.702 m (5' 7\"), weight 65.2 kg (143 lb 11.8 oz), SpO2 99%.       Sleep Log  Estimated \"Sleeping\" Durations   Night Est. Hours   08/05 8.00-8.75   08/06 8.50-9.25   08/07 6.75-7.50   08/08 7.75-8.75   08/09 9.25-9.75   08/10 8.75-9.25   08/11 11.00-11.25   08/12 9.25-9.75   08/13 9.00-9.75   08/14 7.25-8.00   08/15 8.25   08/16 10.00-10.50   08/17 8.75-9.50   08/18 9.25        Review of Systems    Psychiatric ROS - Adult  Anxiety:  PANCHO due to disorganized thought processes  Depression: PANCHO due to disorganized thought processes  Delirium: PANCHO due to disorganized thought processes  Psychosis: delusions continue  Elizabeth: PANCHO due to disorganized thought processes  Safety Issues: PANCHO due to disorganized thought processes  Psychiatric ROS Comment: -    Physical Exam  Vitals and nursing note reviewed.   Pulmonary:      Effort: Pulmonary effort is normal.     Neurological:      Mental Status: He is alert.      Comments: Oriented to himself   He knows he is not at home, unable to state setting he is in  He states he is from [place] and [place]       Mental Status Exam  General: appears older than stated age, disheveled  Appearance: hospital attire  Attitude: pleasant, confused  Behavior: disorganized but cooperative  Motor Activity: gait slow, no abnormal " "movements noted  Speech: reduced rate and volume, poorly enunciated at times   Mood: \"good\"  Affect: mildly constricted, anxious, confused,    Thought Process:  disorganized, loose associations, tangential  Thought Content: probable delusions, probable paranoia, when asked he denies SI/HI  Thought Perception: when asked he denies AH/VH  Cognition: alert and oriented to self  Insight: possible baseline of limited, patient is aware that he has confusion, he denies mental health issues now or in the past  Judgement: possible baseline of poor to fair, patient is taking medications, nonagitated     Psychiatric Risk Assessment  Violence Risk Assessment: major mental illness, male, substance abuse, and other possible developing dementia  Acute Risk of Harm to Others is Considered: moderate   Suicide Risk Assessment: age > 65 yrs old, , and male  Protective Factors against Suicide: other denies SI/HI, denies past SA  Acute Risk of Harm to Self is Considered: moderate    Relevant Results     Results for orders placed or performed during the hospital encounter of 08/05/25 (from the past 96 hours)   TSH with reflex to Free T4 if abnormal   Result Value Ref Range    Thyroid Stimulating Hormone 2.44 0.44 - 3.98 mIU/L       Assessment & Plan  Atypical psychosis    Seizure disorder (Multi)      Ellis Pollack is a 64 year old M with past psychiatric history of anxiety, psychosis, admitted to [facility] on 8/5/25 with psychotic symptoms.      Whether patient presenting with psychosis, emerging dementia, a combination of the 2, or other organic condition, it will likely require a longitudinal assessment opportunity in a medically monitored supervised setting (like a nursing home).  Patient is not acting dangerously presently, not threatening others, and not threatening himself.  It is possible that further medication adjustments at this time may not modify the risk picture in the acute phase, thus the recommendation " for medically monitored setting for longitudinal assessments for an extended period of time. Chart review (limited available content) shows admission in 2021 for confusion, 2023 for new onset seizure disorder. Admitting MMSE 24/30. Consider organic etiology of paranoia/agitation as onset of seizures coincided with change in personality. Will need follow up outpatient with neurology and would benefit from formal neuropsychiatric testing     Contacted his outpatient neurology provider Kathia Chan CNP about his presenting symptoms, our current management, and maximizing his therapy. I discussed concerns regarding possible neuropsychiatric side effects (high incidence) of Keppra and her thoughts about switching his AED therapy:    I think that is absolutely ok to do one like depakote or lamotrigine. He didnt have definitive seizures but was having episodes of unresponsiveness that we were prophylactically treating in case of seizures so I think you can stop the keppra and start on depakote while there! ... any dose that you feel is needed for psych purposes is ok with me! without confirmed seizures I am not as intent on following any specific needs from my end and can always work to titrate or adjust in the future if needed     Biological  - continue risperdal 1mg tid for psychosis/agitation/anxiety with possible intent of DYER (as patient does not appear to be cognizant of need for medication)  - with his outpatient neurology provider permission, discontinue Keppra and replace with VPA 125mg TID (10-15mg/kg). Goal of switching therapy to reduce possible neuropsychiatric side effect of AED therapy, mood stabilization   - monitor LFT and serum levels   - goal to perform MOCA when patient able to participate, given concern MMSE is over representing true cognition at this time  - prns available  - medical co-managing pertinences  - TSH WNL, pending other studies  - c/w daily thiamine and MVI    Psychological  - Provider  encourages patient to attend all groups.     Sociological  - Provider encourages patient to work with social work regarding safe discharge plan.        Nutrition/Malnutrition:  The diagnosis of malnutrition has significant impact on risk adjustment, mortality and readmission rates, length of stay and hospital reimbursement.  The below is a representation of nutrition status if evaluated.  If blank, there is no associated malnutrition finding to incorporate per the dietician team:     Goals for discharge include:  Psychiatric condition: to lower acute risk, return to presumed baseline level of functioning, work to identify positive coping skills to manage psychiatric symptoms, allow for reconciliation of medications.  Physical condition: increase daily physical activity, demonstrate interest in completing daily activity, and complete ADL.  Social function: identify protective factors and family supports, increase social interactions on the unit with peers and providing staff, and demonstrate appropriate problem solving skills for engaging after care on discharge.    Total time spent with patient encounter 45 minutes. This includes patient interview, assessment, extensive chart review, personal review of labs and images as noted above, and formulation of recommendations.     MELVIN Johnson-CNP, AGPCNP-BC, PMHNP-BC  Psychiatry, [facility]  1* contact: [employer] preferred

## 2025-08-19 NOTE — GROUP NOTE
Group Topic: Goals   Group Date: 8/18/2025  Start Time: 2000  End Time: 2015  Facilitators: Meseret Yang   Department: [facility]     Number of Participants: 12   Group Focus: check in  Treatment Modality: Interpersonal Therapy  Interventions utilized were reminiscence  Purpose: coping skills and feelings    Name: Ellis Pollack YOB: 1960   MR: [id number]      Facilitator: Mental Health PCNA  Level of Participation: minimal  Quality of Participation: cooperative  Interactions with others: appropriate  Mood/Affect: blunted, flat, incongruent, and irritable  Triggers (if applicable): none  Cognition: confused, no insight, and rigid  Progress: Minimal  Comments: patient presented with atypical psychosis  Plan: continue with services

## 2025-08-19 NOTE — NURSING NOTE
BIRP NOTE     Problem:  Delusions     Behavior:  Pt is sitting in group room watching Tv with peers. Ate HS snack and was compliant with medications. Blunted affect.   Group Participation: n/a  Appetite/Meals: ate HS snack       Interventions:  1:1 time provided. Administered scheduled medications    Response:  Pt compliant with meds     Plan:  Continue with care plan

## 2025-08-19 NOTE — PROGRESS NOTES
LSW spoke with pt's wife Em to coordinate onsite visit for pt's wife to determine if pt is showing improvement to be able to return home. Family meeting scheduled for tomorrow at 2pm.     MARGARITA Grace

## 2025-08-20 PROBLEM — G31.84 MCI (MILD COGNITIVE IMPAIRMENT): Status: ACTIVE | Noted: 2025-08-20

## 2025-08-20 ASSESSMENT — PAIN SCALES - GENERAL
PAINLEVEL_OUTOF10: 0 - NO PAIN
PAINLEVEL_OUTOF10: 0 - NO PAIN

## 2025-08-20 ASSESSMENT — ACTIVITIES OF DAILY LIVING (ADL): EFFECT OF PAIN ON DAILY ACTIVITIES: MINIMAL

## 2025-08-20 ASSESSMENT — PAIN - FUNCTIONAL ASSESSMENT
PAIN_FUNCTIONAL_ASSESSMENT: 0-10
PAIN_FUNCTIONAL_ASSESSMENT: 0-10

## 2025-08-20 ASSESSMENT — COLUMBIA-SUICIDE SEVERITY RATING SCALE - C-SSRS
1. SINCE LAST CONTACT, HAVE YOU WISHED YOU WERE DEAD OR WISHED YOU COULD GO TO SLEEP AND NOT WAKE UP?: NO
6. HAVE YOU EVER DONE ANYTHING, STARTED TO DO ANYTHING, OR PREPARED TO DO ANYTHING TO END YOUR LIFE?: NO

## 2025-08-20 ASSESSMENT — PAIN DESCRIPTION - DESCRIPTORS: DESCRIPTORS: PATIENT UNABLE TO DESCRIBE

## 2025-08-20 NOTE — PROGRESS NOTES
"Nutrition Follow up Note    Nutrition Assessment      Nutrition History:  Energy Intake: Good > 75 %  Food and Nutrient History: consistently eating 100% of meals    Anthropometrics:  Ht: 170.2 cm (5' 7\"), Wt: 65.2 kg (143 lb 11.8 oz), BMI: 22.51    Weight Change:  Daily Weight  08/13/25 : 65.2 kg (143 lb 11.8 oz)  08/05/25 : 61.2 kg (135 lb) - accurate?   08/05/25 : 65.8 kg (145 lb)  04/10/25 : 68 kg (150 lb)  03/06/25 : 68.4 kg (150 lb 14.4 oz)  04/09/24 : 61.2 kg (135 lb)  02/22/24 : 64.4 kg (142 lb)  02/14/24 : 94.5 kg (208 lb 4.8 oz) - question accuracy      Weight History / % Weight Change: per wt hx, pt with a 15# (10%) wt loss over ~5 months (3/6-8/5). updated wt of 143# on 8/13 - now question if wt of 135# from 8/5 was accurate.  Significant Weight Loss: Yes    Nutrition Focused Physical Exam Findings: defer: not appropriate     Nutrition Significant Labs:  Lab Results   Component Value Date    WBC 9.3 08/05/2025    HGB 14.8 08/05/2025    HCT 43.2 08/05/2025     08/05/2025    ALT 10 08/05/2025    AST 12 08/05/2025     08/05/2025    K 3.9 08/05/2025     (H) 08/05/2025    CREATININE 0.91 08/05/2025    BUN 17 08/05/2025    CO2 27 08/05/2025    TSH 2.44 08/19/2025    HGBA1C 5.4 11/22/2021     Nutrition Specific Medications:  Scheduled Medications[1]  Continuous Medications[2]    Dietary Orders (From admission, onward)       Start     Ordered    08/05/25 2034  Adult diet Cardiac; 70 gm fat; 2 - 3 grams Sodium  Diet effective now        Question Answer Comment   Diet type Cardiac    Fat restriction: 70 gm fat    Sodium restriction: 2 - 3 grams Sodium        08/05/25 2037                  Estimated Needs:   Estimated Energy Needs  Total Energy Estimated Needs in 24 hours (kCal): 1841 kCal  Energy Estimated Needs per kg Body Weight in 24 hours (kCal/kg): 30 kCal/kg  Method for Estimating Needs: actual wt    Estimated Protein Needs  Total Protein Estimated Needs in 24 Hours (g): 74 g  Protein " Estimated Needs per kg Body Weight in 24 Hours (g/kg): 1.2 g/kg  Method for Estimating 24 Hour Protein Needs: actual wt    Estimated Fluid Needs  Method for Estimating 24 Hour Fluid Needs: 1 ml/kcal or per MD       Nutrition Diagnosis   Nutrition Diagnosis:  Malnutrition Diagnosis  Patient has Malnutrition Diagnosis: No    Nutrition Diagnosis  Patient has Nutrition Diagnosis: Yes  Diagnosis Status (1): Resolved  Nutrition Diagnosis 1: Unintended weight loss  Related to (1): decreased ability to consume/tolerate sufficient energy  As Evidenced by (1): 15# (10%) wt loss over ~5 months       Nutrition Interventions/Recommendations   Nutrition Interventions and Recommendations:  Nutrition Prescription: Nutrition prescription for oral nutrition    Nutrition Recommendations:  Individualized Nutrition Prescription Provided for : continue cardiac diet. will discontinue supplement as po intake has improved.    Nutrition Interventions/Goals:   Food and/or Nutrient Delivery Interventions  Interventions: Meals and snacks, Medical food supplement  Meals and Snacks: Mineral-modified diet, Fat-modified diet  Goal: provide as ordered  Medical Food Supplement: Commercial beverage medical food supplement therapy  Goal: discontinue    Education Documentation  No documentation found.              Nutrition Monitoring and Evaluation   Monitoring/Evaluation:   Food/Nutrient Related History Monitoring  Monitoring and Evaluation Plan: Estimated Energy Intake  Estimated Energy Intake: Energy intake greater or equal to 75% of estimated energy needs    Anthropometric Measurements  Monitoring and Evaluation Plan: Body weight  Body Weight: Body weight - Maintain stable weight    Goal Status: Goal(s) achieved    Follow Up  Time Spent (min): 20 minutes  Last Date of Nutrition Visit: 08/20/25  Nutrition Follow-Up Needed?: 7-10 days  Follow up Comment: 8/27-8/29          [1] calcium carbonate, 1,250 mg of calcium carbonate, oral, Daily  divalproex  sprinkle, 125 mg, oral, TID  multivitamin with minerals, 1 tablet, oral, Daily  risperiDONE, 1 mg, oral, TID  thiamine, 100 mg, oral, Daily     [2]

## 2025-08-20 NOTE — GROUP NOTE
Group Topic: Self Esteem   Group Date: 8/20/2025  Start Time: 1100  End Time: 1150  Facilitators: DENVER Mcghee   Department: [facility]    Number of Participants: 7   Group Focus: other Self Esteem Cards  Treatment Modality: Other: Recreation Therapy  Interventions utilized were exploration, group exercise, patient education, problem solving, and support  Purpose: coping skills, maladaptive thinking, feelings, irrational fears, communication skills, insight or knowledge, self-worth, self-care, relapse prevention strategies, and trigger / craving management    Name: Ellis Pollack YOB: 1960   MR: [id number]      Facilitator: Recreational Therapist  Level of Participation: active  Quality of Participation: appropriate/pleasant, attentive, and cooperative  Interactions with others: appropriate and gave feedback  Mood/Affect: appropriate  Triggers (if applicable): n/a  Cognition: confused  Progress: Minimal  Comments: pt problem is delusional thought.  Pt joins group with little encouragement.  Pleasantly confused, responses/comments not related to group topic.   Plan: continue with services

## 2025-08-20 NOTE — CARE PLAN
The patient's goals for the shift include go home    The clinical goals for the shift include maintain safety    Over the shift, the patient did make progress toward the following goals. Barriers to progression include limited insight into need to be here. Recommendations to address these barriers include positive outlook promotion.      Problem: Discharge Planning  Goal: Discharge to home or other facility with appropriate resources  Outcome: Progressing

## 2025-08-20 NOTE — NURSING NOTE
ALLY NOTE     Problem:  Pt. Is continuing their journey on working to improve their anxiety & depressive Sx.     Behavior:  Pt. Is cooperative w/ Tx. Plan and has been agreeable to talking w/ staff & peers alike. Pt. Has been able to maintain safety. Pt. Desires discharge desperately.  Appetite/Meals: good        Interventions:  Pt. Was offered groups and their scheduled medications.     Response:  Pt. Has been semi-compliant w/ meds aside from Depakote and Thiamine, tolerated them well; and did attend the majority of group sessions w/ good results.     Plan:  Pt. will continue to be monitored for changes in mental status & safety on the unit.

## 2025-08-20 NOTE — PROGRESS NOTES
[facility]: [facility]  PROGRESS NOTE      Ellis Pollack is a 64 y.o. male that is being seen  today for follow up at [facility].  Subjective   Pt. Is being seen for follow up at [facility].  Patient has been stable.  Vital signs are stable.  Patient is  pleasantly confused.    Patient's lab work reviewed.  Patient's vitamin D and B12 levels are normal.  Negative for syphilis.  Patient's lab work has been stable.      ROS  Negative for fever or chills  Negative for sore throat, ear pain, nasal discharge  Negative for cough, shortness of breath or wheezing  Negative for chest pain, palpitations, swelling of legs  Negative for abdominal pain, constipation, diarrhea, blood in the stools  Negative for urinary complaints  Negative for headache, dizziness, weakness or numbness  Negative for joint pain  Positive for dementia and anxiety  All other systems reviewed and were negative   Vitals:    08/19/25 1900   BP: 107/69   Pulse: 82   Resp: 17   Temp: 36.9 °C (98.4 °F)   SpO2: 97%      Vitals:    08/13/25 1900   Weight: 65.2 kg (143 lb 11.8 oz)     Body mass index is 22.51 kg/m².  Physical Exam  Constitutional: Patient does not appear to be in any acute distress  Cardiovascular: RRR, S1/S2, no murmurs, rubs, or gallops, radial pulses +2, no edema of extremities  Pulmonary: CTAB, no respiratory distress.  Abdomen: +BS, soft, non-tender, nondistended, no guarding or rebound, no masses noted  MSK: No joint swelling, normal movements of all extremities. Range of motion- normal.  Skin- No lesions, contusions, or erythema.  Peripheral puslses palpable bilaterally 2+  Neuro: AAO X1-2, Cranial nerves 2-12 grossly intact,DTR 2+ in all 4 limbs       LABS   [unfilled]  Lab Results   Component Value Date    GLUCOSE 91 08/05/2025    CALCIUM 9.0 08/05/2025     08/05/2025    K 3.9 08/05/2025    CO2 27 08/05/2025     (H) 08/05/2025    BUN 17 08/05/2025    CREATININE 0.91 08/05/2025     Lab Results  "  Component Value Date    ALT 10 08/05/2025    AST 12 08/05/2025    ALKPHOS 49 08/05/2025    BILITOT 0.6 08/05/2025     Lab Results   Component Value Date    WBC 9.3 08/05/2025    HGB 14.8 08/05/2025    HCT 43.2 08/05/2025    MCV 94 08/05/2025     08/05/2025     No results found for: \"CHOL\"  No results found for: \"HDL\"  No results found for: \"LDLCALC\"  No results found for: \"TRIG\"  Lab Results   Component Value Date    HGBA1C 5.4 11/22/2021     Other labs not included in the list above were reviewed either before or during this encounter.    History    Medical History[1]  Surgical History[2]  Family History[3]  Allergies[4]  Medications Ordered Prior to Encounter[5]  Immunization History   Administered Date(s) Administered    Moderna SARS-CoV-2 Vaccination 04/10/2021, 05/08/2021, 12/28/2021    Pfizer COVID-19 vaccine, 12 years and older, (30mcg/0.3mL) (Comirnaty) 12/06/2023, 09/29/2024    Pfizer COVID-19 vaccine, bivalent, age 12 years and older (30 mcg/0.3 mL) 12/20/2022    Tdap vaccine, age 7 year and older (BOOSTRIX, ADACEL) 02/22/2024     Patient's medical history was reviewed and updated either before or during this encounter.  ASSESSMENT / PLAN:  Active Hospital Problems    Seizure disorder (Multi)      *Atypical psychosis       Patient is being seen for follow-up at [facility].  Patient has seizure disorder and is on Keppra.  Will continue with the same dose.  Patient's vital signs are stable.  Patient is being seen by [facility] team for atypical psychosis.  Patient is pleasantly confused .       Pierce Murray MD                    [1] History reviewed. No pertinent past medical history.  [2] History reviewed. No pertinent surgical history.  [3] No family history on file.  [4] No Known Allergies  [5]   No current facility-administered medications on file prior to encounter.     Current Outpatient Medications on File Prior to Encounter   Medication Sig Dispense Refill    calcium carbonate 500 mg " calcium (1,250 mg) chewable tablet Chew and swallow 1 tablet (500 mg of elemental calcium) once daily.      levETIRAcetam (Keppra) 500 mg tablet Take 1 tablet (500 mg) by mouth 2 times a day. 60 tablet 11    multivitamin tablet Take 1 tablet by mouth once daily.

## 2025-08-20 NOTE — NURSING NOTE
"JOSIEP NOTE     Problem:  Delusions     Behavior:  Pt is sitting in group room watching movie and playing cards with peers. Ate HS snack. Refused HS Risperidone 1mg because it \"makes me too sleepy\". When asked why that's an issue if he's about to go to bed, pt stated \"no, no. It makes me way too sleepy. I don't like it. I'm not taking that. You guys give me too many pills\". Pt compliant with Depakote 125mg.   Group Participation: n/a  Appetite/Meals: ate HS snack       Interventions:  1:1 time provided. Education on medications given    Response:  Pt compliant with Depakote, not compliant with Risperidone.      Plan:  Continue with care plan  "

## 2025-08-20 NOTE — GROUP NOTE
Group Topic: Goals   Group Date: 8/20/2025  Start Time: 0730  End Time: 0800  Facilitators: Jyoti Foote   Department: [facility]     Number of Participants: 7   Group Focus: check in and goals  Treatment Modality: Other: Morning check in and goals group.  Interventions utilized were assignment  Purpose: other: Daily goal setting.    Name: Ellis Pollack YOB: 1960   MR: [id number]      Facilitator: Mental Health [facility]  Level of Participation: moderate  Quality of Participation: appropriate/pleasant, attentive, cooperative, and engaged  Interactions with others: appropriate  Mood/Affect: appropriate  Cognition: coherent/clear  Progress: Moderate  Comments: Patient problem is psychosis. Patient was active and engaged during goals group.  Plan: continue with services       Detail Level: Detailed Size Of Lesion: 1.5cm

## 2025-08-20 NOTE — CARE PLAN
The patient's goals for the shift include rest    The clinical goals for the shift include maintain safety    Over the shift, the patient did make progress toward the following goals.     Problem: Safety - Adult  Goal: Free from fall injury  Outcome: Progressing     Problem: Nutrition  Goal: Nutrient intake appropriate for maintaining nutritional needs  Outcome: Progressing

## 2025-08-20 NOTE — GROUP NOTE
Group Topic: Goals   Group Date: 8/19/2025  Start Time: 2000  End Time: 2015  Facilitators: Meseret Yang   Department: [facility]     Number of Participants: 10   Group Focus: check in  Treatment Modality: Patient-Centered Therapy  Interventions utilized were reminiscence  Purpose: feelings and self-care    Name: Ellis Pollack YOB: 1960   MR: [id number]      Facilitator: Mental Health [facility]  Level of Participation: minimal  Quality of Participation: appropriate/pleasant  Interactions with others: appropriate  Mood/Affect: blunted and flat  Triggers (if applicable): none  Cognition: loose, not focused, and processing slowly  Progress: Minimal  Comments: patient presented with atypical psychosis  Plan: continue with services

## 2025-08-20 NOTE — GROUP NOTE
Group Topic: Other   Group Date: 8/20/2025  Start Time: 1345  End Time: 1430  Facilitators: DENVER Mcghee   Department: [facility]    Number of Participants: 10   Group Focus: other Jeopardy!  Treatment Modality: Other: Recreation Therapy  Interventions utilized were mental fitness  Purpose: other: fun, elevate mood, increase socialization, enhance self esteem, stimulate memory    Name: Ellis Pollack YOB: 1960   MR: [id number]      Facilitator: Recreational Therapist  Level of Participation: active  Quality of Participation: appropriate/pleasant, attentive, and cooperative  Interactions with others: appropriate and gave feedback  Mood/Affect: appropriate  Triggers (if applicable): n/a  Cognition: coherent/clear and confused  Progress: Moderate  Comments: pt problem is delusional thought.  Pt joins group after having a shower.  Is able to focus on group game and responds appropriately to a few questions.    Plan: continue with services

## 2025-08-20 NOTE — PROGRESS NOTES
LSW and provider met with pt and pt's family to discuss pt's treatment and discharge plan. Pt continues to present with paranoia, tangential speech and confusion. Pt presents less irritable and uncooperative with recommendations presented about returning home. The recommendations include taking medications ad prescribed, abstaining from marijuana, and not driving. Pt did not want to listen to what pt's wife and son had to say and was challenging to them, especially in regards to the speeding tickets that he has received. Family continues to endorse that he has trouble with preparing meals and taking his medications. Placement was presented to the family who will consider this option over the next few days. LSW will follow up with family on Friday, but will submit for PASRR in preparation.     MARGARITA GraceW

## 2025-08-20 NOTE — PROGRESS NOTES
"Ellis Pollack is a 64 y.o. male on day 15 of admission presenting with Atypical psychosis.      Subjective   Chart reviewed and case discussed during interdisciplinary rounds.    No overnight concerns from staff. Today pt hit or miss with attending groups, will walk around in hallway observed to be talking to himself at times. Today he states he feels better than before he came into the facility, endorsing he feels \"less anxious\". However, he then derails into a discussion about the government, republicans, and democrats. He then talks about how his bank is stealing money from his wife. Wife is to come into today around 2pm for family meeting.      Objective   Last Recorded Vitals  Blood pressure 107/69, pulse 82, temperature 36.9 °C (98.4 °F), temperature source Temporal, resp. rate 17, height 1.702 m (5' 7\"), weight 65.2 kg (143 lb 11.8 oz), SpO2 97%.       Sleep Log  Estimated \"Sleeping\" Durations   Night Est. Hours   08/05 8.00-8.75   08/06 8.50-9.25   08/07 6.75-7.50   08/08 7.75-8.75   08/09 9.25-9.75   08/10 8.75-9.25   08/11 11.00-11.25   08/12 9.25-9.75   08/13 9.00-9.75   08/14 7.25-8.00   08/15 8.25   08/16 10.00-10.50   08/17 8.75-9.50   08/18 9.25   08/19 8.75-9.00        Review of Systems    Psychiatric ROS - Adult  Anxiety: states his anxiety has decreased since admission  Depression: PANCHO due to disorganized thought processes  Delirium: PANCHO due to disorganized thought processes  Psychosis: delusions continue  Elizabeth: PANCHO due to disorganized thought processes  Safety Issues: PANCHO due to disorganized thought processes  Psychiatric ROS Comment: -    Physical Exam  Vitals and nursing note reviewed.   Pulmonary:      Effort: Pulmonary effort is normal.     Neurological:      Mental Status: He is alert.      Comments:         Mental Status Exam  General: appears older than stated age, disheveled  Appearance: hospital attire  Attitude: pleasant, confused  Behavior: disorganized but cooperative  Motor " "Activity: gait slow, no abnormal movements noted  Speech: reduced rate and volume, poorly enunciated at times   Mood: \"good\"  Affect: mildly constricted, anxious, confused at times  Thought Process:  disorganized, loose associations, tangential  Thought Content:  delusions, paranoia, when asked he denies SI/HI  Thought Perception: when asked he denies AH/VH  Cognition: alert and oriented to self, month, day, year, \"hospital\"  Insight:  limited likely at baseline - delusions persist  Judgement: fair likely at baseline - patient is taking medications, non agitated     Psychiatric Risk Assessment  Violence Risk Assessment: major mental illness, male, substance abuse, and other possible developing dementia  Acute Risk of Harm to Others is Considered: moderate   Suicide Risk Assessment: age > 65 yrs old, , and male  Protective Factors against Suicide: other denies SI/HI, denies past SA  Acute Risk of Harm to Self is Considered: moderate    Relevant Results     Results for orders placed or performed during the hospital encounter of 08/05/25 (from the past 96 hours)   Vitamin B12   Result Value Ref Range    Vitamin B12 320 211 - 911 pg/mL   Vitamin D 25-Hydroxy,Total (for eval of Vitamin D levels)   Result Value Ref Range    Vitamin D, 25-Hydroxy, Total 46 30 - 100 ng/mL   TSH with reflex to Free T4 if abnormal   Result Value Ref Range    Thyroid Stimulating Hormone 2.44 0.44 - 3.98 mIU/L   Syphilis Screen with Reflex   Result Value Ref Range    Syphilis Total Ab Nonreactive Nonreactive       Assessment & Plan  Atypical psychosis    Seizure disorder (Multi)    MCI (mild cognitive impairment)      Ellis Pollack is a 64 year old M with past psychiatric history of anxiety, psychosis, admitted to [facility] on 8/5/25 with psychotic symptoms.      Whether patient presenting with psychosis, emerging dementia, a combination of the 2, or other organic condition, it will likely require a longitudinal assessment " opportunity in a medically monitored supervised setting (like a nursing home).  Patient is not acting dangerously presently, not threatening others, and not threatening himself.  It is possible that further medication adjustments at this time may not modify the risk picture in the acute phase, thus the recommendation for medically monitored setting for longitudinal assessments for an extended period of time. Chart review (limited available content) shows admission in 2021 for confusion, 2023 for new onset seizure disorder. Admitting MMSE 24/30. Consider organic etiology of paranoia/agitation as onset of seizures coincided with change in personality. Will need follow up outpatient with neurology and would benefit from formal neuropsychiatric testing     Family meeting planned for today.    Biological  - c/w risperdal 1mg tid for psychosis/agitation/anxiety with possible intent of DYER    - with his outpatient neurology provider permission, discontinued Keppra 8/19 and replaced with VPA 125mg TID (10-15mg/kg). Goal of switching therapy to reduce possible neuropsychiatric side effect of AED therapy, mood stabilization   - monitor LFT and serum levels   - goal to perform MOCA when patient able to participate, given concern MMSE is over representing true cognition at this time  - prns available  - medical co-managing pertinences  - c/w daily thiamine and MVI    Psychological  - Provider encourages patient to attend all groups.     Sociological  - Provider encourages patient to work with social work regarding safe discharge plan.        Nutrition/Malnutrition:  The diagnosis of malnutrition has significant impact on risk adjustment, mortality and readmission rates, length of stay and hospital reimbursement.  The below is a representation of nutrition status if evaluated.  If blank, there is no associated malnutrition finding to incorporate per the dietician team:     Goals for discharge include:  Psychiatric condition: to  lower acute risk, return to presumed baseline level of functioning, work to identify positive coping skills to manage psychiatric symptoms, allow for reconciliation of medications.  Physical condition: increase daily physical activity, demonstrate interest in completing daily activity, and complete ADL.  Social function: identify protective factors and family supports, increase social interactions on the unit with peers and providing staff, and demonstrate appropriate problem solving skills for engaging after care on discharge.    Total time spent with patient encounter 45 minutes. This includes patient interview, assessment, extensive chart review, personal review of labs and images as noted above, and formulation of recommendations.     Wes Urena, MELVIN-CNP, AGPCNP-BC, PMHNP-BC  Psychiatry, [facility]  1* contact: [employer] preferred

## 2025-08-21 ASSESSMENT — PAIN SCALES - GENERAL
PAINLEVEL_OUTOF10: 0 - NO PAIN
PAINLEVEL_OUTOF10: 0 - NO PAIN

## 2025-08-21 ASSESSMENT — PAIN - FUNCTIONAL ASSESSMENT
PAIN_FUNCTIONAL_ASSESSMENT: 0-10
PAIN_FUNCTIONAL_ASSESSMENT: 0-10

## 2025-08-21 NOTE — GROUP NOTE
Group Topic: Reflection   Group Date: 8/21/2025  Start Time: 1030  End Time: 1145  Facilitators: Apoorva FALL CTRS   Department: [facility]    Number of Participants: 9   Group Focus: mindfulness, other reflection, guidance, relaxation, and self-awareness  Treatment Modality: Other: Creative art, Recreation Therapy  Interventions utilized were exploration, reminiscence, story telling, and support  Purpose: Patients participated in therapeutic creative arts activity utilizing water color and positive words of affirmation. This activity promoted creative expression, leisure awareness and social interaction, while also working on fine motor skills and following directions. Patients were prompted to visualize “they are lost on a stormy night. They see a glimmer of light leading to land”. They were then asked to paint an image of a lighthouse as a source of guidance in their life. Also to paint themselves in the somewhere in the image and add words to represent their sources of guidance in their life. CTRS also played peaceful music to create a calm and supportive environment.      Name: Ellis Pollack YOB: 1960   MR: [id number]      Facilitator: Recreational Therapist  Level of Participation: active  Quality of Participation: appropriate/pleasant, attentive, cooperative, and engaged  Interactions with others: appropriate  Mood/Affect: appropriate  Cognition: coherent/clear/confused  Progress: Moderate  Comments: pleasantly confused. Pt is able to follow direction and complete prompted activity appropriately without redirection.   Plan: continue with services

## 2025-08-21 NOTE — GROUP NOTE
Group Topic: Goals   Group Date: 8/21/2025  Start Time: 0730  End Time: 0800  Facilitators: Will Thomas   Department: [facility]     Number of Participants: 15   Group Focus: check in and goals  Treatment Modality: Individual Therapy  Interventions utilized were assignment  Purpose: feelings and self-care    Name: Ellis Pollack YOB: 1960   MR: [id number]      Facilitator: Mental Health PCNA  Level of Participation: active  Quality of Participation: appropriate/pleasant, attentive, cooperative, and engaged  Interactions with others: appropriate  Mood/Affect: appropriate  Cognition: coherent/clear and goal directed  Progress: Moderate  Comments: patient was able to make progress towards treatment of atypical psychosis  Plan: continue with services

## 2025-08-21 NOTE — CARE PLAN
The patient's goals for the shift include sleep    The clinical goals for the shift include promote rest    Over the shift, the patient did make progress toward the following goals. Barriers to progression include to get at least 8 hrs of sleep nightly. Recommendations to address these barriers include continue to get 8 hrs of sleep.

## 2025-08-21 NOTE — GROUP NOTE
Group Topic: Music Therapy   Group Date: 8/20/2025  Start Time: 1000  End Time: 1050  Facilitators: Vesna Hennessy   Department: [facility]    Number of Participants: 8   Group Focus: activities of daily living skills and communication/socialization  Treatment Modality: Music Therapy  Interventions Utilized were: passive music engagement, reminiscence, and sharing/discussion    Pts were prompted to talk about what activities, chores, or special things they like to do each season. They were also asked to choose songs to represent each season, and worked on all of these prompts together as a group.       Name: Ellis Pollack YOB: 1960   MR: [id number]      Level of Participation: active  Quality of Participation: appropriate/pleasant and cooperative  Interactions with others: appropriate  Mood/Affect: positive  Cognition, Pre Treatment: attentive  Cognition, Post Treatment: attentive  Progress: Significant  Plan: continue with services    Pt contributed many meaningful and relevant thoughts and song suggestions to discussion. Much more present and aware than in previous sessions.

## 2025-08-21 NOTE — GROUP NOTE
Group Topic: Goals   Group Date: 8/20/2025  Start Time: 2003  End Time: 2020  Facilitators: Karli Edward   Department: [facility]     Number of Participants: 7   Group Focus: goals  Treatment Modality: Other: PCA  Interventions utilized were reminiscence  Purpose: feelings and communication skills    Name: Ellis Pollack YOB: 1960   MR: [id number]      Facilitator: Mental Health PCNA  Level of Participation: active  Quality of Participation: appropriate/pleasant, attentive, and cooperative  Interactions with others: appropriate, gave feedback, and supportive  Mood/Affect: positive  Triggers (if applicable): N/A  Cognition: coherent/clear  Progress: Moderate  Comments: Pt problem is atypical psychosis.   Plan: continue with services

## 2025-08-21 NOTE — NURSING NOTE
"ALLY NOTE     Problem:  paranoia     Behavior:  Pt is isolative, low depressive mood is noted, confused about place and situation, follows instructions. During medication administration Pt pulled risperidone 1mg tablet out from the pill container  and stated that \"this pill makes me tired\", refused to take risperidone, took Depakote only. Pt reported that he is frustrated with his family, did not specify the reason.    Group Participation: n/a  Appetite/Meals: hs snack provided       Interventions:  1:1 time provided, scheduled medication attempted to be administered    Response:  Pt is minimally engaged, refused risperidone, took only Depakote     Plan:  Reorient, redirect when needed, provide safe environment  "

## 2025-08-21 NOTE — CARE PLAN
The patient's goals for the shift include sleep    The clinical goals for the shift include promote rest    Over the shift, the patient did not make progress toward the following goals. Barriers to progression include confusion. Recommendations to address these barriers include redirection.      Problem: Safety - Adult  Goal: Free from fall injury  Outcome: Progressing     Problem: Chronic Conditions and Co-morbidities  Goal: Patient's chronic conditions and co-morbidity symptoms are monitored and maintained or improved  Outcome: Progressing     Problem: Nutrition  Goal: Nutrient intake appropriate for maintaining nutritional needs  Outcome: Progressing

## 2025-08-21 NOTE — PROGRESS NOTES
"Ellis Pollack is a 64 y.o. male on day 16 of admission presenting with Atypical psychosis.      Subjective   Chart reviewed and case discussed during interdisciplinary rounds.    Per nursing, patient declined to take his evening dose of Risperdal 1mg PO because \"this pill makes me tired.\" Patient also declined to take his morning and afternoon doses of Depakote 125mg PO yesterday. Had an in-depth discussion with the patient about the adjustments that were made to his medications including stopping Keppra and starting Depakote. Pt seemed to think he was receiving two AEDs. Reiterated to patient the importance of taking his medications. Today pt continues to present with disorganized speech and responses to questions are tangential. He continues to report that the police and federal government are after him which he states \"aggravates the hell out of me.\"    Objective   Last Recorded Vitals  Blood pressure 115/78, pulse 81, temperature 36.7 °C (98.1 °F), temperature source Temporal, resp. rate 18, height 1.702 m (5' 7\"), weight 65.2 kg (143 lb 11.8 oz), SpO2 100%.       Sleep Log  Estimated \"Sleeping\" Durations   Night Est. Hours   08/06 8.50-9.25   08/07 6.75-7.50   08/08 7.75-8.75   08/09 9.25-9.75   08/10 8.75-9.25   08/11 11.00-11.25   08/12 9.25-9.75   08/13 9.00-9.75   08/14 7.25-8.00   08/15 8.25   08/16 10.00-10.50   08/17 8.75-9.50   08/18 9.25   08/19 8.75-9.00   08/20 8.75-9.25        Review of Systems    Psychiatric ROS - Adult  Anxiety:  PANCHO due to disorganized thought processes  Depression: PANCHO due to disorganized thought processes  Delirium: PANCHO due to disorganized thought processes  Psychosis: delusions continue  Elizabeth: PANCHO due to disorganized thought processes  Safety Issues: PANCHO due to disorganized thought processes  Psychiatric ROS Comment: -    Physical Exam  Vitals and nursing note reviewed.   Pulmonary:      Effort: Pulmonary effort is normal.     Neurological:      Mental Status: He is alert. " "     Comments: Oriented to person, place, time, and president.      Mental Status Exam  General: appears older than stated age, disheveled  Appearance: hospital attire  Attitude: pleasant, confused  Behavior: disorganized but cooperative  Motor Activity: gait slow, no abnormal movements noted  Speech: reduced rate and volume, poorly enunciated at times   Mood: \"good\"  Affect: mildly constricted, anxious, confused,    Thought Process:  disorganized, loose associations, tangential  Thought Content: probable delusions, probable paranoia, when asked he denies SI/HI  Thought Perception: when asked he denies AH/VH  Cognition: alert and oriented to self, place, time, and president  Insight: possible baseline of limited, patient is aware that he has confusion, he denies mental health issues now or in the past  Judgement: possible baseline of poor to fair, patient is intermittently taking medications, nonagitated     Psychiatric Risk Assessment  Violence Risk Assessment: major mental illness, male, substance abuse, and other possible developing dementia  Acute Risk of Harm to Others is Considered: moderate   Suicide Risk Assessment: age > 65 yrs old, , and male  Protective Factors against Suicide: other denies SI/HI, denies past SA  Acute Risk of Harm to Self is Considered: moderate    Relevant Results     Results for orders placed or performed during the hospital encounter of 08/05/25 (from the past 96 hours)   Vitamin B12   Result Value Ref Range    Vitamin B12 320 211 - 911 pg/mL   Vitamin D 25-Hydroxy,Total (for eval of Vitamin D levels)   Result Value Ref Range    Vitamin D, 25-Hydroxy, Total 46 30 - 100 ng/mL   TSH with reflex to Free T4 if abnormal   Result Value Ref Range    Thyroid Stimulating Hormone 2.44 0.44 - 3.98 mIU/L   Syphilis Screen with Reflex   Result Value Ref Range    Syphilis Total Ab Nonreactive Nonreactive     Assessment & Plan  Atypical psychosis    Seizure disorder (Multi)    MCI (mild " cognitive impairment)      Ellis Pollack is a 64 year old M with past psychiatric history of anxiety, psychosis, admitted to [facility] on 8/5/25 with psychotic symptoms.      Whether patient presenting with psychosis, emerging dementia, a combination of the 2, or other organic condition, it will likely require a longitudinal assessment opportunity in a medically monitored supervised setting (like a nursing home).  Patient is not acting dangerously presently, not threatening others, and not threatening himself.  It is possible that further medication adjustments at this time may not modify the risk picture in the acute phase, thus the recommendation for medically monitored setting for longitudinal assessments for an extended period of time. Chart review (limited available content) shows admission in 2021 for confusion, 2023 for new onset seizure disorder. Admitting MMSE 24/30. Consider organic etiology of paranoia/agitation as onset of seizures coincided with change in personality. Will need follow up outpatient with neurology and would benefit from formal neuropsychiatric testing     Biological  - continue risperdal 1mg tid for psychosis/agitation/anxiety with possible intent of DYER (as patient does not appear to be cognizant of need for medication)  - continue Depakote 125mg TID (10-15mg/kg) to reduce possible neuropsychiatric side effect of AED therapy and mood stabilization. Outpatient neurology provider Kathia Chan CNP agreed with switching Keppra to Depakote to minimize neuropsychiatric symptoms.    - monitor LFT and serum levels with morning draw once patient has been taking medications consistently for 3-5 days   - goal to perform MOCA when patient able to participate, given concern MMSE is over representing true cognition at this time  - prns available  - medical co-managing pertinences  - TSH, Vitamin D, Vitamin B12 WNL,  - c/w daily thiamine and MVI    Psychological  - Provider encourages patient  to attend all groups.     Sociological  - Provider encourages patient to work with social work regarding safe discharge plan.        Nutrition/Malnutrition:  The diagnosis of malnutrition has significant impact on risk adjustment, mortality and readmission rates, length of stay and hospital reimbursement.  The below is a representation of nutrition status if evaluated.  If blank, there is no associated malnutrition finding to incorporate per the dietician team:     Goals for discharge include:  Psychiatric condition: to lower acute risk, return to presumed baseline level of functioning, work to identify positive coping skills to manage psychiatric symptoms, allow for reconciliation of medications.  Physical condition: increase daily physical activity, demonstrate interest in completing daily activity, and complete ADL.  Social function: identify protective factors and family supports, increase social interactions on the unit with peers and providing staff, and demonstrate appropriate problem solving skills for engaging after care on discharge.    Total time spent with patient encounter 35 minutes. This includes patient interview, assessment, extensive chart review, personal review of labs and images as noted above, and formulation of recommendations.     Mariella Trinidad  Physician Assistant Student

## 2025-08-21 NOTE — GROUP NOTE
Group Topic: Other   Group Date: 8/21/2025  Start Time: 1330  End Time: 1430  Facilitators: BONNIE ColeS   Department: [facility] REHABTH VIRTUAL    Number of Participants: 7   Group Focus: leisure skills, self-esteem, and social skills  Treatment Modality: Leisure Development and Other: Recreation Therapy  Interventions utilized were group exercise, leisure development, and support  Purpose: Patients engaged in a group session with game of “Sumpto” with questions which aims to stimulate cognition, leisure development and social interaction. Group game also aims to increase memory by way of reminiscing, direction following, fine motor skills and eye-hand coordination.      Name: Ellis Pollack YOB: 1960   MR: [id number]      Facilitator: Recreational Therapist  Level of Participation: active  Quality of Participation: appropriate/pleasant, attentive, cooperative, and engaged  Interactions with others: appropriate  Mood/Affect: appropriate and brightens with interaction  Cognition: coherent/clear  Progress: Moderate  Comments: Engages easily with staff and peers. Appropriate and cooperative interactions. Displays good attention to tasks.  Appropriate use of sense of humor. Often seen displaying bright/brighter affect.    Plan: continue with services

## 2025-08-22 ASSESSMENT — PAIN SCALES - GENERAL
PAINLEVEL_OUTOF10: 0 - NO PAIN
PAINLEVEL_OUTOF10: 0 - NO PAIN

## 2025-08-22 ASSESSMENT — PAIN - FUNCTIONAL ASSESSMENT: PAIN_FUNCTIONAL_ASSESSMENT: 0-10

## 2025-08-22 NOTE — GROUP NOTE
Group Topic: Goals   Group Date: 8/22/2025  Start Time: 0730  End Time: 0815  Facilitators: Jyoti Foote   Department: [facility] Geriatric     Number of Participants: 7   Group Focus: check in and goals  Treatment Modality: Other: Morning check in and goal setting  Interventions utilized were assignment and other discussion  Purpose: other: Daily mental health check in and goal setting    Name: Ellis Pollack YOB: 1960   MR: [id number]      Facilitator: Mental Health STEPHAN  Level of Participation: moderate  Quality of Participation: appropriate/pleasant, attentive, cooperative, and engaged  Interactions with others: appropriate  Mood/Affect: appropriate  Cognition: coherent/clear  Progress: Moderate  Comments: Patient problem is psychosis. Patient was active and engaged during group discussion.  Plan: continue with services

## 2025-08-22 NOTE — GROUP NOTE
Group Topic: Goals   Group Date: 8/21/2025  Start Time: 2003  End Time: 2020  Facilitators: Karli Edward   Department: [facility] Geriatric     Number of Participants: 8   Group Focus: goals  Treatment Modality: Other: PCA  Interventions utilized were reminiscence  Purpose: feelings    Name: Ellis Pollack YOB: 1960   MR: [id number]      Facilitator: Mental Health PCNA  Level of Participation: active  Quality of Participation: appropriate/pleasant, attentive, and cooperative  Interactions with others: appropriate, gave feedback, and supportive  Mood/Affect: positive  Triggers (if applicable): N/A  Cognition: coherent/clear  Progress: Moderate  Comments: Pt problem is atypical psychosis.   Plan: continue with services

## 2025-08-22 NOTE — PROGRESS NOTES
"Ellis Pollack is a 64 y.o. male on day 17 of admission presenting with Atypical psychosis.      Subjective   Chart reviewed and case discussed during interdisciplinary rounds.    No overnight concerns from staff. Patient reports he is eating and sleeping well. He declined to attend group this morning because he feels like he can't speak freely. Patient's compliance with Depakote 125mg PO has improved. Patient declined to take yesterday's afternoon and evening doses and today's morning dose of Risperdal 1mg PO TID because it \"knocks me out.\" Reiterated to patient the importance of taking his medications. He states the Risperdal is not working and makes him too tired so he will not be taking it. Discussed with patient that we will consolidate his Depakote to one nighttime dose to address his concerns of excessive sedation. Pt is in agreement and thankful for the adjustment. Today pt continues to present with disorganized speech and responses to questions are tangential. He continues to report that \"the government is abusing everyone.\"     Objective   Last Recorded Vitals  Blood pressure 122/73, pulse 76, temperature 36.6 °C (97.9 °F), temperature source Temporal, resp. rate 18, height 1.702 m (5' 7\"), weight 65.2 kg (143 lb 11.8 oz), SpO2 98%.       Sleep Log  Estimated \"Sleeping\" Durations   Night Est. Hours   08/07 6.75-7.50   08/08 7.75-8.75   08/09 9.25-9.75   08/10 8.75-9.25   08/11 11.00-11.25   08/12 9.25-9.75   08/13 9.00-9.75   08/14 7.25-8.00   08/15 8.25   08/16 10.00-10.50   08/17 8.75-9.50   08/18 9.25   08/19 8.75-9.00   08/20 8.75-9.25   08/21 6.50-6.75        Review of Systems    Psychiatric ROS - Adult  Anxiety:  PANCHO due to disorganized thought processes  Depression: PANCHO due to disorganized thought processes  Delirium: PANCHO due to disorganized thought processes  Psychosis: delusions continue  Elizabeth: PANCHO due to disorganized thought processes  Safety Issues: PANCHO due to disorganized thought " "processes  Psychiatric ROS Comment: -    Physical Exam  Vitals and nursing note reviewed.   Pulmonary:      Effort: Pulmonary effort is normal.     Neurological:      Mental Status: He is alert.      Comments: Oriented to person, place, time, and president.      Mental Status Exam  General: appears older than stated age, disheveled  Appearance: hospital attire  Attitude: frustrated, confused  Behavior: disorganized but cooperative  Motor Activity: not observed, patient in bed. No abnormal movements noted  Speech: reduced rate and volume, poorly enunciated at times   Mood: \"they've lost it\" PANCHO, patient tangential  Affect: mildly constricted, anxious, confused,    Thought Process:  disorganized, loose associations, tangential  Thought Content: probable delusions, probable paranoia, when asked he denies SI/HI  Thought Perception: when asked he denies AH/VH  Cognition: alert and oriented to self, place, time, and president  Insight: possible baseline of limited, patient is aware that he has confusion, he denies mental health issues now or in the past  Judgement: possible baseline of poor to fair, patient is intermittently taking medications, nonagitated     Psychiatric Risk Assessment  Violence Risk Assessment: major mental illness, male, substance abuse, and other possible developing dementia  Acute Risk of Harm to Others is Considered: moderate   Suicide Risk Assessment: age > 65 yrs old, , and male  Protective Factors against Suicide: other denies SI/HI, denies past SA  Acute Risk of Harm to Self is Considered: moderate    Relevant Results     Results for orders placed or performed during the hospital encounter of 08/05/25 (from the past 96 hours)   Vitamin B12   Result Value Ref Range    Vitamin B12 320 211 - 911 pg/mL   Vitamin D 25-Hydroxy,Total (for eval of Vitamin D levels)   Result Value Ref Range    Vitamin D, 25-Hydroxy, Total 46 30 - 100 ng/mL   TSH with reflex to Free T4 if abnormal   Result Value " Ref Range    Thyroid Stimulating Hormone 2.44 0.44 - 3.98 mIU/L   Syphilis Screen with Reflex   Result Value Ref Range    Syphilis Total Ab Nonreactive Nonreactive     Assessment & Plan  Atypical psychosis    Seizure disorder (Multi)    MCI (mild cognitive impairment)      Ellis Pollack is a 64 year old M with past psychiatric history of anxiety, psychosis, admitted to [facility] on 8/5/25 with psychotic symptoms.      Whether patient presenting with psychosis, emerging dementia, a combination of the 2, or other organic condition, it will likely require a longitudinal assessment opportunity in a medically monitored supervised setting (like a nursing home).  Patient is not acting dangerously presently, not threatening others, and not threatening himself.  It is possible that further medication adjustments at this time may not modify the risk picture in the acute phase, thus the recommendation for medically monitored setting for longitudinal assessments for an extended period of time. Chart review (limited available content) shows admission in 2021 for confusion, 2023 for new onset seizure disorder. Admitting MMSE 24/30. Consider organic etiology of paranoia/agitation as onset of seizures coincided with change in personality. Will need follow up outpatient with neurology and would benefit from formal neuropsychiatric testing.     Biological  - continue risperdal 1mg tid for psychosis/agitation/anxiety with possible intent of DYER (as patient does not appear to be cognizant of need for medication)  - change Depakote 125mg TID (10-15mg/kg) to Depakote ER 24 hr tablet 500 mg to promote medication adherence for mood stabilization. Outpatient neurology provider Kathia Chan CNP agreed with switching Keppra to Depakote to minimize neuropsychiatric symptoms. Dr. Dhillon agreed with making Depakote once daily in the evening to promote patient compliance.    - monitor LFT and serum levels with morning draw once patient  has been taking medications consistently for 3-5 days   - goal to perform MOCA when patient able to participate, given concern MMSE is over representing true cognition at this time  - prns available  - medical co-managing pertinences  - TSH, Vitamin D, Vitamin B12 WNL  - c/w daily thiamine and MVI    Psychological  - Provider encourages patient to attend all groups.     Sociological  - Provider encourages patient to work with social work regarding safe discharge plan.        Nutrition/Malnutrition:  The diagnosis of malnutrition has significant impact on risk adjustment, mortality and readmission rates, length of stay and hospital reimbursement.  The below is a representation of nutrition status if evaluated.  If blank, there is no associated malnutrition finding to incorporate per the dietician team:     Goals for discharge include:  Psychiatric condition: to lower acute risk, return to presumed baseline level of functioning, work to identify positive coping skills to manage psychiatric symptoms, allow for reconciliation of medications.  Physical condition: increase daily physical activity, demonstrate interest in completing daily activity, and complete ADL.  Social function: identify protective factors and family supports, increase social interactions on the unit with peers and providing staff, and demonstrate appropriate problem solving skills for engaging after care on discharge.    Total time spent with patient encounter 35 minutes. This includes patient interview, assessment, extensive chart review, personal review of labs and images as noted above, and formulation of recommendations.     Mariella Trinidad  Physician Assistant Student

## 2025-08-22 NOTE — GROUP NOTE
Group Topic: Other   Group Date: 8/22/2025  Start Time: 1330  End Time: 1430  Facilitators: Apoorva FALL CTRS   Department: [facility] REHABTH VIRTUAL    Number of Participants: 10   Group Focus: concentration, leisure skills, self-esteem, and social skills  Treatment Modality: Leisure Development and Other: Recreation Therapy  Interventions utilized were group exercise, leisure development, and mental fitness  Purpose: Patients engaged in group game of Catch Phrase. This game can help to improve frustration tolerance, concentration, improving social communication, and building rapport for a positive social interaction while encouraging flexibility in thinking.       Name: Ellis Pollack YOB: 1960   MR: [id number]      Facilitator: {[facility] Group Facilitator:[id number]}  Level of Participation: {THERAPIES; PSYCH GROUP PARTICIPATION LEVEL:29306}  Quality of Participation: {THERAPIES; PSYCH QUALITY OF PARTICIPATION:61859}  Interactions with others: {THERAPIES; PSYCH INTERACTIONS:03535}  Mood/Affect: {THERAPIES; PSYCH MOOD/AFFECT:94589}  Triggers (if applicable): ***  Cognition: {THERAPIES; PSYCH COGNITION:24967}  Progress: {THERAPIES; PSYCH PROGRESS:77217}  Comments: ***  Plan: {THERAPIES; PSYCH PLAN:29334}

## 2025-08-22 NOTE — GROUP NOTE
Group Topic: Safety   Group Date: 8/22/2025  Start Time: 1030  End Time: 1130  Facilitators: BONNIE ColeS   Department: [facility] REHABTH VIRTUAL    Number of Participants: 9   Group Focus: personal responsibility, safety plan, and self-awareness  Treatment Modality: Patient-Centered Therapy and Other: Recreation Therapy  Interventions utilized were assignment, exploration, group exercise, patient education, and support  Purpose: Patients engaged in group session focused on creating person centered safety plans. Patients were prompted to write a list of coping strategies and sources of support. Patients can use these strategies before or during a crisis.    Name: Ellis Pollack YOB: 1960   MR: [id number]      Facilitator: Recreational Therapist  Level of Participation: minimal  Quality of Participation: passive  Interactions with others: minimal  Mood/Affect: passive  Cognition: not focused  Progress: Minimal  Comments: pt leaves group after a few minutes. Does not return. Safety plan to be completed at a later time.   Plan: continue with services

## 2025-08-22 NOTE — CARE PLAN
The patient's goals for the shift include sleep    The clinical goals for the shift include promote rest    Over the shift, the patient did make progress toward the following goals. Barriers to progression include continue to get 8 hrs of sleep. Recommendations to address these barriers include promote rest.

## 2025-08-22 NOTE — PROGRESS NOTES
LSW spoke with pt's wife to discuss disposition and discharge plan. Pt's wife has not yet made a decision. Pt's wife is concerned that if pt was to return back home that the behaviors were to continue. LSW talked pt's wife through the reasons why pt would benefit from placement. LSW also explained that it could be for a short period of time and then reevaluate in a month or so. LSW explained the PASRR process and that it is another opinion of pt needs this level of care. LSW will follow up with pt's wife on Monday in hopes that the results of PASRR are complete to be able to help her better make a decision.     Grace Murphy, MARGARITA BLACK

## 2025-08-22 NOTE — CARE PLAN
The patient's goals for the shift include sleep    The clinical goals for the shift include promote rest    Over the shift, the patient did not make progress toward the following goals. Barriers to progression include short term memory impairment. Recommendations to address these barriers include redirect, reorient.      Problem: Mental health issues  Goal: Stabilize adverse mental health factors affecting plan of care  Outcome: Progressing     Problem: Discharge Planning  Goal: Discharge to home or other facility with appropriate resources  Outcome: Progressing     Problem: Chronic Conditions and Co-morbidities  Goal: Patient's chronic conditions and co-morbidity symptoms are monitored and maintained or improved  Outcome: Progressing

## 2025-08-22 NOTE — NURSING NOTE
"JOSIEP NOTE     Problem:  paranoia     Behavior:  Pt is seen in group room, social with peers, coloring pages, pleasant and cooperative. During medication administration Pt pulled risperidone 1mg tablet out from the pill container, stated \"this is risperidone, I'm not taking it\", took Depakote. No confusion noted today, no paranoia, Pt reported that doesn't understand the reason for being hospitalized.     Group Participation: n/a  Appetite/Meals: hs snack provided        Interventions:  scheduled medication attempted to be administered     Response:  Pt refused risperidone, compliant with Depakote     Plan:  Provide education as needed, adhere to care plan  "

## 2025-08-22 NOTE — PROGRESS NOTES
REHAB Therapy Assessment & Treatment    Patient Name: Ellis Pollack  MRN: [id number]  Today's Date: 8/22/2025    Recreation Therapy note: Pt is alert and oriented x 2-3 with some poor insight/judgement. Has attended most groups of choice daily, displaying as pleasantly confused. At times, pt needs redirection to focus and can be distracting to peers and CTRS in group. Pt has been seen interacting with peers on unit appropriately during the day watching television and playing chess. Pt is eating and sleeping well. CTRS will continue to encourage pt to attend groups of choice daily.

## 2025-08-23 ASSESSMENT — PAIN SCALES - GENERAL
PAINLEVEL_OUTOF10: 0 - NO PAIN
PAINLEVEL_OUTOF10: 0 - NO PAIN

## 2025-08-23 ASSESSMENT — ACTIVITIES OF DAILY LIVING (ADL): EFFECT OF PAIN ON DAILY ACTIVITIES: MINIMAL

## 2025-08-23 ASSESSMENT — PAIN DESCRIPTION - DESCRIPTORS: DESCRIPTORS: PATIENT UNABLE TO DESCRIBE

## 2025-08-23 ASSESSMENT — PAIN - FUNCTIONAL ASSESSMENT
PAIN_FUNCTIONAL_ASSESSMENT: 0-10
PAIN_FUNCTIONAL_ASSESSMENT: 0-10

## 2025-08-23 NOTE — CARE PLAN
The patient's goals for the shift include talk to doc    The clinical goals for the shift include maintain safety/rest    Over the shift, the patient did make progress toward the following goals. Barriers to progression include some lack of insight. Recommendations to address these barriers include positive reinforcement.      Problem: Discharge Planning  Goal: Discharge to home or other facility with appropriate resources  Outcome: Progressing

## 2025-08-23 NOTE — GROUP NOTE
Group Topic: Self-Care/Wellness   Group Date: 8/23/2025  Start Time: 1015  End Time: 1145  Facilitators: DENVER Mcghee   Department: [facility] REHABTH VIRTUAL    Number of Participants: 10   Group Focus: other Unit Orientation/Self Care Habits  Treatment Modality: Other: Recreation Therapy  Interventions utilized were clarification, exploration, group exercise, patient education, problem solving, and support  Purpose: coping skills, maladaptive thinking, feelings, irrational fears, communication skills, insight or knowledge, self-worth, self-care, relapse prevention strategies, and trigger / craving management    Name: Ellis Pollack YOB: 1960   MR: [id number]      Facilitator: Recreational Therapist  Level of Participation: minimal  Quality of Participation: distractible  Interactions with others: pt joins group with little encouragement.  Displays mostly passive participation.  Is writing comments not related to group topic on a piece of paper.  Mood/Affect: pt is not focused on group topic.   Triggers (if applicable): n/a  Cognition: confused  Progress: Minimal  Comments: pt problem is delusional thought.  Plan: continue with services

## 2025-08-23 NOTE — NURSING NOTE
ALLY NOTE     Problem:  Pt. Is continuing their journey on working to improve their anxiety & depressive Sx.     Behavior:  Pt. Is cooperative w/ Tx. Plan and has been agreeable to talking w/ staff & peers alike. Pt. Has been able to maintain safety.   Appetite/Meals: good        Interventions:  Pt. Was offered groups and their scheduled medications.     Response:  Pt. Has been compliant w/ meds, tolerated them well; and did attend the majority of group sessions w/ good results.     Plan:  Pt. will continue to be monitored for changes in mental status & safety on the unit.

## 2025-08-23 NOTE — GROUP NOTE
Group Topic: Reflection   Group Date: 8/23/2025  Start Time: 1340  End Time: 1440  Facilitators: DENVER Mcghee   Department: [facility] REHABTH VIRTUAL    Number of Participants: 9   Group Focus: other Let's Talk  Treatment Modality: Other: Recreation Therapy  Interventions utilized were exploration, reminiscence, and story telling  Purpose: feelings and other: fun, elevate mood, increase socialization    Name: Ellis Pollack YOB: 1960   MR: [id number]      Facilitator: Recreational Therapist  Level of Participation: moderate  Quality of Participation: appropriate/pleasant, cooperative, and distractible  Interactions with others: gave feedback and intrusive  Mood/Affect: closed / guarded and    Triggers (if applicable): n/a  Cognition: confused  Progress: Minimal  Comments: pt problem is delusional thought.  Pt joins group with little encouragement.  Slightly intrusive with comments/statements not directly related to group topic.  Pleasantly confused.   Plan: continue with services

## 2025-08-23 NOTE — GROUP NOTE
Group Topic: Goals   Group Date: 8/22/2025  Start Time: 2000  End Time: 2015  Facilitators: Meseret Yang   Department: [facility] Geriatric     Number of Participants: 12   Group Focus: check in  Treatment Modality: Interpersonal Therapy  Interventions utilized were reality testing  Purpose: feelings    Name: Ellis Pollack YOB: 1960   MR: [id number]      Facilitator: Mental Health PCNA  Level of Participation: minimal  Quality of Participation: cooperative  Interactions with others: sarcastic  Mood/Affect: irritable  Triggers (if applicable): none  Cognition: confused  Progress: Minimal  Comments: patient presented with atypical psychosis  Plan: continue with services

## 2025-08-23 NOTE — PROGRESS NOTES
"Ellis Pollack is a 64 y.o. male on day 18 of admission presenting with Atypical psychosis.      Subjective   Chart reviewed and case discussed during interdisciplinary rounds.    No overnight concerns from staff. Patient reports he is eating and sleeping well. Attending group today, although frequently left. States he has nothing to talk to this provider about, and that he is too busy and going to use the phone. Today pt continues to present with disorganized speech and responses to questions are tangential.    Objective   Last Recorded Vitals  Blood pressure 124/82, pulse 75, temperature 36 °C (96.8 °F), temperature source Temporal, resp. rate 17, height 1.702 m (5' 7\"), weight 65.2 kg (143 lb 11.8 oz), SpO2 97%.       Sleep Log  Estimated \"Sleeping\" Durations   Night Est. Hours   08/08 7.75-8.75   08/09 9.25-9.75   08/10 8.75-9.25   08/11 11.00-11.25   08/12 9.25-9.75   08/13 9.00-9.75   08/14 7.25-8.00   08/15 8.25   08/16 10.00-10.50   08/17 8.75-9.50   08/18 9.25   08/19 8.75-9.00   08/20 8.75-9.25   08/21 6.50-6.75   08/22 9.75        Review of Systems    Psychiatric ROS - Adult  Anxiety:  PANCHO due to disorganized thought processes  Depression: PANCHO due to disorganized thought processes  Delirium: PANCHO due to disorganized thought processes  Psychosis: delusions continue  Elizabeth: PANCHO due to disorganized thought processes  Safety Issues: PANCHO due to disorganized thought processes  Psychiatric ROS Comment: -    Physical Exam  Vitals and nursing note reviewed.   Pulmonary:      Effort: Pulmonary effort is normal.     Neurological:      Mental Status: He is alert.      Comments: Oriented to person, place, time, and president.      Mental Status Exam  General: appears older than stated age, disheveled  Appearance: hospital attire  Attitude: frustrated, confused  Behavior: disorganized but cooperative  Motor Activity: not observed, patient in bed. No abnormal movements noted  Speech: reduced rate and volume, poorly " "enunciated at times   Mood: \"fine\"  Affect: mildly constricted, anxious, confused,    Thought Process:  disorganized, loose associations, tangential  Thought Content: probable delusions, probable paranoia, when asked he denies SI/HI  Thought Perception: when asked he denies AH/VH  Cognition: alert and oriented to self, place, time, and president  Insight: possible baseline of limited, patient is aware that he has confusion, he denies mental health issues now or in the past  Judgement: possible baseline of poor to fair, patient is intermittently taking medications, nonagitated     Psychiatric Risk Assessment  Violence Risk Assessment: major mental illness, male, substance abuse, and other possible developing dementia  Acute Risk of Harm to Others is Considered: moderate   Suicide Risk Assessment: age > 65 yrs old, , and male  Protective Factors against Suicide: other denies SI/HI, denies past SA  Acute Risk of Harm to Self is Considered: moderate    Relevant Results     No results found for this or any previous visit (from the past 96 hours).    Assessment & Plan  Atypical psychosis    Seizure disorder (Multi)    MCI (mild cognitive impairment)      Ellis Pollack is a 64 year old M with past psychiatric history of anxiety, psychosis, admitted to [facility] on 8/5/25 with psychotic symptoms.      Whether patient presenting with psychosis, emerging dementia, a combination of the 2, or other organic condition, it will likely require a longitudinal assessment opportunity in a medically monitored supervised setting (like a nursing home).  Patient is not acting dangerously presently, not threatening others, and not threatening himself.  It is possible that further medication adjustments at this time may not modify the risk picture in the acute phase, thus the recommendation for medically monitored setting for longitudinal assessments for an extended period of time. Chart review (limited available content) " shows admission in 2021 for confusion, 2023 for new onset seizure disorder. Admitting MMSE 24/30. Consider organic etiology of paranoia/agitation as onset of seizures coincided with change in personality. Will need follow up outpatient with neurology and would benefit from formal neuropsychiatric testing.     Biological  - continue risperdal 1mg tid for psychosis/agitation/anxiety with possible intent of DYER (as patient does not appear to be cognizant of need for medication)  - continued changed Depakote 125mg TID (10-15mg/kg) to Depakote ER 24 hr tablet 500 mg to promote medication adherence for mood stabilization. Outpatient neurology provider Kathia Chan CNP agreed with switching Keppra to Depakote to minimize neuropsychiatric symptoms. Dr. Dhillon agreed with making Depakote once daily in the evening to promote patient compliance.    - monitor LFT and serum levels with morning draw once patient has been taking medications consistently for 3-5 days   - goal to perform MOCA when patient able to participate, given concern MMSE is over representing true cognition at this time  - prns available  - medical co-managing pertinences  - TSH, Vitamin D, Vitamin B12 WNL  - c/w daily thiamine and MVI    Psychological  - Provider encourages patient to attend all groups.     Sociological  - Provider encourages patient to work with social work regarding safe discharge plan.        Nutrition/Malnutrition:  The diagnosis of malnutrition has significant impact on risk adjustment, mortality and readmission rates, length of stay and hospital reimbursement.  The below is a representation of nutrition status if evaluated.  If blank, there is no associated malnutrition finding to incorporate per the dietician team:     Goals for discharge include:  Psychiatric condition: to lower acute risk, return to presumed baseline level of functioning, work to identify positive coping skills to manage psychiatric symptoms, allow for  reconciliation of medications.  Physical condition: increase daily physical activity, demonstrate interest in completing daily activity, and complete ADL.  Social function: identify protective factors and family supports, increase social interactions on the unit with peers and providing staff, and demonstrate appropriate problem solving skills for engaging after care on discharge.    Total time spent with patient encounter 25 minutes. This includes patient interview, assessment, extensive chart review, personal review of labs and images as noted above, and formulation of recommendations.

## 2025-08-23 NOTE — GROUP NOTE
Group Topic: Goals   Group Date: 8/23/2025  Start Time: 0730  End Time: 0800  Facilitators: Alfonso Glover   Department: [facility] Geriatric     Number of Participants: 6   Group Focus: goals  Treatment Modality: Patient-Centered Therapy  Interventions utilized were assignment  Purpose: self-care    Name: Ellis Pollack YOB: 1960   MR: [id number]    Facilitator: Mental Health PCNA  Level of Participation: did not attend  Quality of Participation: did not attend  Interactions with others: did not attend  Mood/Affect: did not attend  Triggers (if applicable): did not attend  Cognition: did not attend  Progress: None  Comments: did not attend  Plan: continue with services

## 2025-08-23 NOTE — CARE PLAN
The patient's goals for the shift include sleep    The clinical goals for the shift include promote rest/maintain safety      Problem: Safety - Adult  Goal: Free from fall injury  Outcome: Progressing     Problem: Discharge Planning  Goal: Discharge to home or other facility with appropriate resources  Outcome: Progressing     Problem: Chronic Conditions and Co-morbidities  Goal: Patient's chronic conditions and co-morbidity symptoms are monitored and maintained or improved  Outcome: Progressing     Problem: Fall/Injury  Goal: Pace activities to prevent fatigue by end of the shift  Outcome: Progressing

## 2025-08-24 ASSESSMENT — PAIN DESCRIPTION - DESCRIPTORS: DESCRIPTORS: PATIENT UNABLE TO DESCRIBE

## 2025-08-24 ASSESSMENT — PAIN SCALES - GENERAL
PAINLEVEL_OUTOF10: 0 - NO PAIN
PAINLEVEL_OUTOF10: 0 - NO PAIN

## 2025-08-24 ASSESSMENT — PAIN - FUNCTIONAL ASSESSMENT: PAIN_FUNCTIONAL_ASSESSMENT: 0-10

## 2025-08-24 ASSESSMENT — ACTIVITIES OF DAILY LIVING (ADL): EFFECT OF PAIN ON DAILY ACTIVITIES: MINIMAL

## 2025-08-24 ASSESSMENT — PAIN SCALES - WONG BAKER: WONGBAKER_NUMERICALRESPONSE: NO HURT

## 2025-08-24 NOTE — GROUP NOTE
Group Topic: Goals   Group Date: 8/23/2025  Start Time: 2000  End Time: 2015  Facilitators: Meseret Yang   Department: [facility] Geriatric     Number of Participants: 14   Group Focus: check in  Treatment Modality: Interpersonal Therapy  Interventions utilized were reminiscence  Purpose: feelings    Name: Ellis Pollack YOB: 1960   MR: [id number]      Facilitator: Mental Health PCNA  Level of Participation: minimal  Quality of Participation: intrusive  Interactions with others: blunted  Mood/Affect: blunted and irritable  Triggers (if applicable): none    Cognition: confused, not focused, pressured speech, and rigid  Progress: Minimal  Comments: patient presented with atypical psychosis  Plan: continue with services

## 2025-08-24 NOTE — CARE PLAN
The patient's goals for the shift include talk to doc    The clinical goals for the shift include maintain safety/rest    Over the shift, the patient did make progress toward the following goals. Barriers to progression include mild confusion. Recommendations to address these barriers include positive reinforcement & redirection if warranted.      Problem: Discharge Planning  Goal: Discharge to home or other facility with appropriate resources  Outcome: Progressing

## 2025-08-24 NOTE — NURSING NOTE
BIRP NOTE     Problem:  Paranoid     Behavior:   Pt calm and pleasant. Social with peers. Cooperative with care. No paranoia noted.   Group Participation: attends  Appetite/Meals: HS snack provided.       Interventions:  HS medications administered per MAR    Response:  Medication compliant.     Plan:  Continue to monitor and assist. Maintain q15 minute rounds for safety.

## 2025-08-24 NOTE — PROGRESS NOTES
"Ellis Pollack is a 64 y.o. male on day 19 of admission presenting with Atypical psychosis.      Subjective   Chart reviewed and case discussed during interdisciplinary rounds.    No overnight concerns from staff. Patient reports he is eating and sleeping well. Met with pt in his room, states he has no clue who I am and that he has never met me, states that he \"had no idea who Mr. BAKER was until he told me ... I figured he was a doctor but I don't know.\" Today pt continues to present with disorganized speech and responses to questions are tangential.    Objective   Last Recorded Vitals  Blood pressure 111/74, pulse 75, temperature 36.7 °C (98.1 °F), temperature source Temporal, resp. rate 17, height 1.702 m (5' 7\"), weight 65.2 kg (143 lb 11.8 oz), SpO2 97%.       Sleep Log  Estimated \"Sleeping\" Durations   Night Est. Hours   08/10 8.75-9.25   08/11 11.00-11.25   08/12 9.25-9.75   08/13 9.00-9.75   08/14 7.25-8.00   08/15 8.25   08/16 10.00-10.50   08/17 8.75-9.50   08/18 9.25   08/19 8.75-9.00   08/20 8.75-9.25   08/21 6.50-6.75   08/22 9.75   08/23 9.25-9.75   08/24 0.00-0.25        Review of Systems    Psychiatric ROS - Adult  Anxiety:  PANCHO due to disorganized thought processes  Depression: PANCHO due to disorganized thought processes  Delirium: PANCHO due to disorganized thought processes  Psychosis: delusions continue  Elizabeth: PANCHO due to disorganized thought processes  Safety Issues: PANCHO due to disorganized thought processes  Psychiatric ROS Comment: -    Physical Exam  Vitals and nursing note reviewed.   Pulmonary:      Effort: Pulmonary effort is normal.     Neurological:      Mental Status: He is alert.      Comments: Oriented to person, place, time, and president.      Mental Status Exam  General: appears older than stated age, disheveled  Appearance: hospital attire  Attitude: frustrated, confused  Behavior: disorganized but cooperative  Motor Activity: not observed, patient in bed. No abnormal movements " "noted  Speech: reduced rate and volume, poorly enunciated at times   Mood: \"ready to go\"  Affect: mildly constricted, anxious, confused,    Thought Process:  disorganized, loose associations, tangential  Thought Content: probable delusions, probable paranoia, when asked he denies SI/HI  Thought Perception: when asked he denies AH/VH  Cognition: alert and oriented to self, place, time, and president  Insight: possible baseline of limited, patient is aware that he has confusion, he denies mental health issues now or in the past  Judgement: possible baseline of poor to fair, patient is intermittently taking medications, nonagitated     Psychiatric Risk Assessment  Violence Risk Assessment: major mental illness, male, substance abuse, and other possible developing dementia  Acute Risk of Harm to Others is Considered: moderate   Suicide Risk Assessment: age > 65 yrs old, , and male  Protective Factors against Suicide: other denies SI/HI, denies past SA  Acute Risk of Harm to Self is Considered: moderate    Relevant Results     No results found for this or any previous visit (from the past 96 hours).    Assessment & Plan  Atypical psychosis    Seizure disorder (Multi)    MCI (mild cognitive impairment)      Ellis Pollack is a 64 year old M with past psychiatric history of anxiety, psychosis, admitted to [facility] on 8/5/25 with psychotic symptoms.      Whether patient presenting with psychosis, emerging dementia, a combination of the 2, or other organic condition, it will likely require a longitudinal assessment opportunity in a medically monitored supervised setting (like a nursing home).  Patient is not acting dangerously presently, not threatening others, and not threatening himself.  It is possible that further medication adjustments at this time may not modify the risk picture in the acute phase, thus the recommendation for medically monitored setting for longitudinal assessments for an extended " period of time. Chart review (limited available content) shows admission in 2021 for confusion, 2023 for new onset seizure disorder. Admitting MMSE 24/30. Consider organic etiology of paranoia/agitation as onset of seizures coincided with change in personality. Will need follow up outpatient with neurology and would benefit from formal neuropsychiatric testing.     Biological  - continue risperdal 1mg tid for psychosis/agitation/anxiety with possible intent of DYER (as patient does not appear to be cognizant of need for medication)  - continued changed Depakote 125mg TID (10-15mg/kg) to Depakote ER 24 hr tablet 500 mg to promote medication adherence for mood stabilization. Outpatient neurology provider Kathia Chan CNP agreed with switching Keppra to Depakote to minimize neuropsychiatric symptoms. Dr. Dhillon agreed with making Depakote once daily in the evening to promote patient compliance.    - monitor LFT and serum levels with morning draw once patient has been taking medications consistently for 3-5 days   - goal to perform MOCA when patient able to participate, given concern MMSE is over representing true cognition at this time  - prns available  - medical co-managing pertinences  - TSH, Vitamin D, Vitamin B12 WNL  - c/w daily thiamine and MVI    Psychological  - Provider encourages patient to attend all groups.     Sociological  - Provider encourages patient to work with social work regarding safe discharge plan.        Nutrition/Malnutrition:  The diagnosis of malnutrition has significant impact on risk adjustment, mortality and readmission rates, length of stay and hospital reimbursement.  The below is a representation of nutrition status if evaluated.  If blank, there is no associated malnutrition finding to incorporate per the dietician team:     Goals for discharge include:  Psychiatric condition: to lower acute risk, return to presumed baseline level of functioning, work to identify positive coping  skills to manage psychiatric symptoms, allow for reconciliation of medications.  Physical condition: increase daily physical activity, demonstrate interest in completing daily activity, and complete ADL.  Social function: identify protective factors and family supports, increase social interactions on the unit with peers and providing staff, and demonstrate appropriate problem solving skills for engaging after care on discharge.    Total time spent with patient encounter 25 minutes. This includes patient interview, assessment, extensive chart review, personal review of labs and images as noted above, and formulation of recommendations.

## 2025-08-24 NOTE — GROUP NOTE
Group Topic: Other   Group Date: 8/24/2025  Start Time: 1330  End Time: 1430  Facilitators: DENVER Mcghee   Department: [facility] REHABTH VIRTUAL    Number of Participants: 10   Group Focus: other Movie [employer]you  Treatment Modality: Other: Recreation Therapy  Interventions utilized were mental fitness  Purpose: other: fun, elevate mood, increase socialization, stimulate memory    Name: Ellis Pollack YOB: 1960   MR: [id number]      Facilitator: Recreational Therapist  Level of Participation: minimal  Quality of Participation: passive and quiet  Interactions with others: pt joins group with little encouragement, is coloring during group and not focused on group game.  Mood/Affect: passive  Triggers (if applicable): n/a  Cognition: passive  Progress: Minimal  Comments: pt problem is delusional thought.  Pt quietly colors a picture during group game.   Plan: continue with services

## 2025-08-24 NOTE — GROUP NOTE
Group Topic: Goals   Group Date: 8/24/2025  Start Time: 0730  End Time: 0800  Facilitators: Alfonso Glover   Department: [facility] Geriatric     Number of Participants: 5   Group Focus: goals  Treatment Modality: Patient-Centered Therapy  Interventions utilized were assignment  Purpose: self-care    Name: Ellis Pollack YOB: 1960   MR: [id number]      Facilitator: Mental Health PCNA  Level of Participation: minimal  Quality of Participation: appropriate/pleasant  Interactions with others: appropriate  Mood/Affect: appropriate  Triggers (if applicable): n/a  Cognition: coherent/clear  Progress: Moderate  Comments: pt attended group   Plan: continue with services

## 2025-08-24 NOTE — GROUP NOTE
Group Topic: Illness Education   Group Date: 8/24/2025  Start Time: 1010  End Time: 1130  Facilitators: DENVER Mcghee   Department: [facility] REHABTH VIRTUAL    Number of Participants: 10   Group Focus: other Education on Depressed Mood/Anxiety  Treatment Modality: Other: Recreation Therapy  Interventions utilized were confrontation, exploration, patient education, problem solving, and support  Purpose: coping skills, maladaptive thinking, feelings, irrational fears, communication skills, insight or knowledge, self-worth, self-care, relapse prevention strategies, and trigger / craving management    Name: Ellis Pollack YOB: 1960   MR: [id number]      Facilitator: Recreational Therapist  Level of Participation: minimal  Quality of Participation: passive and quiet  Interactions with others: mostly passive  Mood/Affect: passive  Triggers (if applicable): n/a  Cognition: passive  Progress: None  Comments: pt problem is delusional thought.  Pt joins group after resting in his room.  Passive with participation.   Plan: continue with services

## 2025-08-24 NOTE — CARE PLAN
The patient's goals for the shift include talk to doc    The clinical goals for the shift include maintain safety/rest      Problem: Safety - Adult  Goal: Free from fall injury  Outcome: Progressing     Problem: Discharge Planning  Goal: Discharge to home or other facility with appropriate resources  Outcome: Progressing     Problem: Chronic Conditions and Co-morbidities  Goal: Patient's chronic conditions and co-morbidity symptoms are monitored and maintained or improved  Outcome: Progressing     Problem: Nutrition  Goal: Nutrient intake appropriate for maintaining nutritional needs  Outcome: Progressing

## 2025-08-25 ASSESSMENT — PAIN - FUNCTIONAL ASSESSMENT: PAIN_FUNCTIONAL_ASSESSMENT: 0-10

## 2025-08-25 ASSESSMENT — PAIN SCALES - GENERAL: PAINLEVEL_OUTOF10: 0 - NO PAIN

## 2025-08-25 NOTE — GROUP NOTE
Group Topic: Goals   Group Date: 8/24/2025  Start Time: 2000  End Time: 2015  Facilitators: Meseret Yagn   Department: [facility] Geriatric     Number of Participants: 14   Group Focus: check in  Treatment Modality: Patient-Centered Therapy  Interventions utilized were support  Purpose: coping skills and feelings    Name: Ellis Pollack YOB: 1960   MR: [id number]      Facilitator: Mental Health PCNA  Level of Participation: minimal  Quality of Participation: appropriate/pleasant  Interactions with others: appropriate  Mood/Affect: appropriate  Triggers (if applicable): none  Cognition: confused, loose, not focused, and processing slowly  Progress: Minimal  Comments: patient presented with atypical psychosis  Plan: continue with services

## 2025-08-25 NOTE — CARE PLAN
The patient's goals for the shift include group therapy    The clinical goals for the shift include no falls and to maintain safety    Over the shift, the patient did not make progress toward the following goals. Barriers to progression include delusions. Recommendations to address these barriers include continue to promote group therapy during this shift and promote medication compliance.

## 2025-08-25 NOTE — GROUP NOTE
Group Topic: Goals   Group Date: 8/25/2025  Start Time: 0730  End Time: 0800  Facilitators: Jyoti Foote   Department: [facility]     Number of Participants: 6   Group Focus: goals  Treatment Modality: Other: Check in and goal setting  Interventions utilized were assignment  Purpose: other: Daily check in and personal goal setting    Name: Ellis Pollack YOB: 1960   MR: [id number]      Facilitator: [facility]  Level of Participation: active  Quality of Participation: appropriate/pleasant, attentive, and cooperative  Interactions with others: appropriate  Mood/Affect: appropriate  Cognition: coherent/clear  Progress: Moderate  Comments: Patient problem is psychosis. Patient was active and engaged during group.  Plan: continue with services

## 2025-08-25 NOTE — CARE PLAN
The patient's goals for the shift include To get some rest    The clinical goals for the shift include No slips or falls by end of shift    Recommendations to address these barriers include educating to wear appropriate footing to minimize slips and trips within the hospital milieu.

## 2025-08-25 NOTE — PROGRESS NOTES
[facility]: [facility]  PROGRESS NOTE      Ellis Pollack is a 64 y.o. male that is being seen  today for follow up at [facility].  Subjective   Pt. Is being seen for follow up at [facility].  Patient has been stable.  Vital signs are stable.  Patient is  pleasantly confused.    Patient's lab work reviewed.  Patient's vitamin D and B12 levels are normal.  Negative for syphilis.  Patient's lab work has been stable.      ROS  Negative for fever or chills  Negative for sore throat, ear pain, nasal discharge  Negative for cough, shortness of breath or wheezing  Negative for chest pain, palpitations, swelling of legs  Negative for abdominal pain, constipation, diarrhea, blood in the stools  Negative for urinary complaints  Negative for headache, dizziness, weakness or numbness  Negative for joint pain  Positive for dementia and anxiety  All other systems reviewed and were negative   Vitals:    08/25/25 0700   BP: 105/72   Pulse: 72   Resp: 17   Temp: 36.3 °C (97.3 °F)   SpO2: 98%      Vitals:    08/13/25 1900   Weight: 65.2 kg (143 lb 11.8 oz)     Body mass index is 22.51 kg/m².  Physical Exam  Constitutional: Patient does not appear to be in any acute distress  Cardiovascular: RRR, S1/S2, no murmurs, rubs, or gallops, radial pulses +2, no edema of extremities  Pulmonary: CTAB, no respiratory distress.  Abdomen: +BS, soft, non-tender, nondistended, no guarding or rebound, no masses noted  MSK: No joint swelling, normal movements of all extremities. Range of motion- normal.  Skin- No lesions, contusions, or erythema.  Peripheral puslses palpable bilaterally 2+  Neuro: AAO X1-2, Cranial nerves 2-12 grossly intact,DTR 2+ in all 4 limbs       LABS   [unfilled]  Lab Results   Component Value Date    GLUCOSE 91 08/05/2025    CALCIUM 9.0 08/05/2025     08/05/2025    K 3.9 08/05/2025    CO2 27 08/05/2025     (H) 08/05/2025    BUN 17 08/05/2025    CREATININE 0.91 08/05/2025     Lab Results  "  Component Value Date    ALT 10 08/05/2025    AST 12 08/05/2025    ALKPHOS 49 08/05/2025    BILITOT 0.6 08/05/2025     Lab Results   Component Value Date    WBC 9.3 08/05/2025    HGB 14.8 08/05/2025    HCT 43.2 08/05/2025    MCV 94 08/05/2025     08/05/2025     No results found for: \"CHOL\"  No results found for: \"HDL\"  No results found for: \"LDLCALC\"  No results found for: \"TRIG\"  Lab Results   Component Value Date    HGBA1C 5.4 11/22/2021     Other labs not included in the list above were reviewed either before or during this encounter.    History    Medical History[1]  Surgical History[2]  Family History[3]  Allergies[4]  Medications Ordered Prior to Encounter[5]  Immunization History   Administered Date(s) Administered    Moderna SARS-CoV-2 Vaccination 04/10/2021, 05/08/2021, 12/28/2021    Pfizer COVID-19 vaccine, 12 years and older, (30mcg/0.3mL) (Comirnaty) 12/06/2023, 09/29/2024    Pfizer COVID-19 vaccine, bivalent, age 12 years and older (30 mcg/0.3 mL) 12/20/2022    Tdap vaccine, age 7 year and older (BOOSTRIX, ADACEL) 02/22/2024     Patient's medical history was reviewed and updated either before or during this encounter.  ASSESSMENT / PLAN:  Active Hospital Problems    MCI (mild cognitive impairment)      Seizure disorder (Multi)      *Atypical psychosis       Patient is being seen for follow-up at [facility].  Patient has seizure disorder and is on Keppra.  Will continue with the same dose.  Patient's vital signs are stable.  Patient is being seen by [facility] team for atypical psychosis.  Patient is pleasantly confused .       Pierce Murray MD                      [1] History reviewed. No pertinent past medical history.  [2] History reviewed. No pertinent surgical history.  [3] No family history on file.  [4] No Known Allergies  [5]   No current facility-administered medications on file prior to encounter.     Current Outpatient Medications on File Prior to Encounter   Medication Sig " Dispense Refill    calcium carbonate 500 mg calcium (1,250 mg) chewable tablet Chew and swallow 1 tablet (500 mg of elemental calcium) once daily.      levETIRAcetam (Keppra) 500 mg tablet Take 1 tablet (500 mg) by mouth 2 times a day. 60 tablet 11    multivitamin tablet Take 1 tablet by mouth once daily.

## 2025-08-25 NOTE — GROUP NOTE
"Group Topic: Other   Group Date: 8/25/2025  Start Time: 1330  End Time: 1430  Facilitators: Apoorva FALL CTRS   Department: [facility]    Number of Participants: 10   Group Focus: leisure skills, self-esteem, and social skills, perception  Treatment Modality: Leisure Development and Other: Recreation Therapy  Interventions utilized were group exercise, leisure development, and support  Purpose: This session involved a leisure activity game “apples to apples\".  This is a perspective-based game which involves cognitive tasks, social interactions, leisure awareness, and following directions.  Name: Ellis Pollack YOB: 1960   MR: [id number]      Facilitator: Recreational Therapist  Level of Participation: did not attend  Progress: None  Comments: pt declines to attend, despite encouragement. No handout available.   Plan: continue with services      "

## 2025-08-25 NOTE — PROGRESS NOTES
LSW checked HENS for pt's PASRR, which is still in process. ALEXW responded by email to an update request from Jyoti Garg at the [facility] board. ALEXW instructed that placement is recommended due to pt's cognitive impairment and that we are waiting PASRR results.     Grace Murphy, MARGARITA JOHNSONW

## 2025-08-25 NOTE — GROUP NOTE
Group Topic: Self-Care/Wellness   Group Date: 8/25/2025  Start Time: 1030  End Time: 1130  Facilitators: BONNIE ColeS   Department: [facility]    Number of Participants: 10   Group Focus: mental, emotional and physical well-being   Treatment Modality: Leisure Development, Education, Recreation Therapy  Interventions utilized were exploration, group exercise, leisure development, mental fitness, patient education, and support  Purpose: In this group session, patients engaged in Q&A focused on?promoting a lifestyle aimed at helping patients increase control over their health and improve quality of life, enhance cognition and stimulate socialization. Topics include healthy sleep, leisure skills, physical activity, nutrition, and stress relief.      Name: Ellis Pollack YOB: 1960   MR: [id number]      Facilitator: Recreational Therapist  Level of Participation: moderate  Quality of Participation: distractible, distracting to others, engaged, and intrusive  Interactions with others: intrusive  Mood/Affect: blunted and closed / guarded  Cognition: confused  Progress: Minimal  Comments: pt needs redirection often, not very effective. Can become argumentative with redirection. Can be slightly intrusive with comments and mumbling.   Plan: continue with services

## 2025-08-25 NOTE — NURSING NOTE
BIRP NOTE     Problem:  Atypical psychosis     Behavior:  Calm and cooperative, concerned with medication side effects.  Group Participation: morning groups  Appetite/Meals: 100/100/100       Interventions:  Pt encouraged to speak with provider in morning regarding side effects.    Response:  Pt cooperative.     Plan:  Continue with current plan of care.

## 2025-08-25 NOTE — NURSING NOTE
ALLY NOTE     Problem:  Paranoia      Behavior:  Pt presents calm at time of assessment. Pt Denies Auditory and Visual , and Denies suicidal ideation with plan, and voices no homicidal ideation. Pt voices 0/10 pain at this time during assessment. Pt educated on medications and has no other new concerns. Pt compliant with medication at this time. Pt has not been given PRN medications. Pt has no other new concerns with nutritional needs at this time. Pt encouraged to attend unit programing throughout shift to and to adopt positive coping skills. Pt also encouraged to wear slip resistance foot wear to reduce and minimize falls while ambulating around the hospital unit. Pt encouraged to be social while on unit with other peers.     Group Participation: Yes minimal  Appetite/Meals: Good    [unfilled]      Interventions:  Pt. Was offered groups and their scheduled medications per MAR orders.    PRN Medications:  N/A     Response:  Pt. Has been compliant w/ meds, tolerated them well; and did attend the majority of group sessions w/ good results.     Plan:  Pt. will continue to be monitored Q 15 minutes  for changes in mental status & safety on the unit.    Charly Shafer RN  8/24/2025
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
## 2025-08-26 ASSESSMENT — PAIN SCALES - GENERAL: PAINLEVEL_OUTOF10: 0 - NO PAIN

## 2025-08-26 NOTE — PROGRESS NOTES
"Ellis Pollack is a 64 y.o. male on day 21 of admission presenting with Atypical psychosis.      Subjective   Chart reviewed and case discussed during interdisciplinary rounds.    No overnight concerns from staff. Patient reports he is eating and sleeping well. Attends group but participation is poor, difficult for him to follow along.       Objective   Last Recorded Vitals  Blood pressure 119/80, pulse 74, temperature 36.6 °C (97.9 °F), temperature source Temporal, resp. rate 17, height 1.702 m (5' 7\"), weight 65.2 kg (143 lb 11.8 oz), SpO2 96%.       Sleep Log  Estimated \"Sleeping\" Durations   Night Est. Hours   08/11 11.00-11.25   08/12 9.25-9.75   08/13 9.00-9.75   08/14 7.25-8.00   08/15 8.25   08/16 10.00-10.50   08/17 8.75-9.50   08/18 9.25   08/19 8.75-9.00   08/20 8.75-9.25   08/21 6.50-6.75   08/22 9.75   08/23 9.25-9.75   08/24 7.00-8.25   08/25 7.75-8.75        Review of Systems    Psychiatric ROS - Adult  Anxiety:  PANCHO due to disorganized thought processes  Depression: PANCHO due to disorganized thought processes  Delirium: PANCHO due to disorganized thought processes  Psychosis: delusions. When asked he denies experiencing AVH  Elizabeth: PANCHO due to disorganized thought processes  Safety Issues: when asked he denies SI, denies HI  Psychiatric ROS Comment: -    Physical Exam  Vitals and nursing note reviewed.   Pulmonary:      Effort: Pulmonary effort is normal.     Neurological:      Mental Status: He is alert.       Mental Status Exam  General: appears older than stated age, disheveled  Appearance: hospital attire (noted he is wearing hospital pants with a pair of shorts on top of them)  Attitude: calm, confused  Behavior: disorganized but cooperative  Motor Activity: No abnormal movements noted  Speech: reduced rate and volume, poorly enunciated at times   Mood: \"good\"  Affect: mildly constricted    Thought Process:  disorganized, loose associations, tangential  Thought Content: delusions, probable paranoia, " when asked he denies SI/HI  Thought Perception: when asked he denies AH/VH  Cognition: alert and oriented to self, place, time, and president  Insight: possible baseline of limited - short term memory deficits, does not recall why he is admitted, extent of his illness  Judgement: possible baseline of poor to fair, patient is intermittently taking medications, nonagitated     Psychiatric Risk Assessment  Violence Risk Assessment: major mental illness, male, substance abuse, and other possible developing dementia  Acute Risk of Harm to Others is Considered: low  Suicide Risk Assessment: age > 65 yrs old, , and male  Protective Factors against Suicide: other denies SI/HI, denies past SA  Acute Risk of Harm to Self is Considered: moderate    Relevant Results     No results found for this or any previous visit (from the past 96 hours).    Assessment & Plan  Atypical psychosis    Seizure disorder (Multi)    MCI (mild cognitive impairment)      Ellis Pollack is a 64 year old M with past psychiatric history of anxiety, psychosis, admitted to [facility] on 8/5/25 with psychotic symptoms.      Whether patient presenting with psychosis, emerging dementia, a combination of the 2, or other organic condition, it will likely require a longitudinal assessment opportunity in a medically monitored supervised setting (like a nursing home).  Patient is not acting dangerously presently, not threatening others, and not threatening himself.  It is possible that further medication adjustments at this time may not modify the risk picture in the acute phase, thus the recommendation for medically monitored setting for longitudinal assessments for an extended period of time. Chart review (limited available content) shows admission in 2021 for confusion, 2023 for new onset seizure disorder. Admitting MMSE 24/30. Consider organic etiology of paranoia/agitation as onset of seizures coincided with change in personality. Will need  follow up outpatient with neurology and would benefit from formal neuropsychiatric testing.    Pt continues to benefit from admission for stabilization, safety, and safe disposition planning. Social work continues to work with family regarding their capabilities to care for patient at home, possible assisted placement. PASSR denied today.     Biological  - continue risperdal 1mg tid for psychosis/agitation/anxiety    - continue Depakote ER 24 hr, increase to 750mg for mood stabilization (outpatient neurology provider Kathia Chan CNP agreed with switching Keppra to Depakote to minimize potential neuropsychiatric side effects)   - LFT today WNL   - VPA level today 44  - prns available  - medical co-managing pertinences  - c/w daily thiamine and MVI    Psychological  - Provider encourages patient to attend all groups.     Sociological  - Provider encourages patient to work with social work regarding safe discharge plan.        Nutrition/Malnutrition:  The diagnosis of malnutrition has significant impact on risk adjustment, mortality and readmission rates, length of stay and hospital reimbursement.  The below is a representation of nutrition status if evaluated.  If blank, there is no associated malnutrition finding to incorporate per the dietician team:     Goals for discharge include:  Psychiatric condition: to lower acute risk, return to presumed baseline level of functioning, work to identify positive coping skills to manage psychiatric symptoms, allow for reconciliation of medications.  Physical condition: increase daily physical activity, demonstrate interest in completing daily activity, and complete ADL.  Social function: identify protective factors and family supports, increase social interactions on the unit with peers and providing staff, and demonstrate appropriate problem solving skills for engaging after care on discharge.    Total time spent with patient encounter 25 minutes. This includes patient  interview, assessment, extensive chart review, personal review of labs and images as noted above, and formulation of recommendations.     Wes Urena, MELVIN-CNP, AGPCNP-BC, PMHNP-BC  Psychiatry, [facility]  1* contact: [employer] preferred

## 2025-08-26 NOTE — GROUP NOTE
Group Topic: Medication Education   Group Date: 8/26/2025  Start Time: 1100  End Time: 1150  Facilitators: Ena Stockton PharmD   Department: [facility]    Number of Participants: 10   Group Focus: daily focus and other general medication education  Treatment Modality: Skills Training  Interventions utilized were group exercise, other PrivacyCentralopardy Game, and patient education  Purpose: insight or knowledge and other: improved medication compliance    Name: Ellis Pollack YOB: 1960   MR: [id number]      Facilitator: Pharmacist  Level of Participation: minimal  Quality of Participation: intrusive, isolative, quiet, and withdrawn  Interactions with others: appropriate  Mood/Affect: incongruent  Triggers (if applicable): n/a  Cognition: confused, loose, not focused, and processing slowly  Progress: Minimal  Comments: Ellis Pollack attended the general medication education group today. We played JeTristdy.  We went around the the room choosing categories and each time that Ellis Pollack was called upon (it was his turn) he was unable to participate - as he could not follow along with the Jeopardy game.  Elils Pollack offered a few incongruent comments during the group- however he was hard to understand his speech and the material shared was not on topic to the discussion.  He did always raise his hand.   Ellis Pollack was quiet and at time drowsy in group.     Plan: continue with services

## 2025-08-26 NOTE — NURSING NOTE
ALLY NOTE     Problem:  Delusions     Behavior:  Patient is in the group room sitting in a chair and eating a snack. Patient is pleasant and calm. Patient’s affect is blunted. Patient is a little talkative. Patient maintains brief eye contact.    Group Participation: N/A  Appetite/Meals: N/A       Interventions:  This nurse completed a shift assessment and administered patient's scheduled night time medication.    Response:  Patient remained pleasant and calm and was cooperative throughout this shift assessment and medication administration.     Plan:  Continue to monitor for delusions. Continue to monitor for patient safety.

## 2025-08-26 NOTE — GROUP NOTE
Group Topic: Goals   Group Date: 8/25/2025  Start Time: 2004  End Time: 2024  Facilitators: Karli Edward   Department: [facility]    Number of Participants: 11   Group Focus: goals  Treatment Modality: Other: PCA  Interventions utilized were reminiscence  Purpose: feelings and communication skills    Name: Ellis Pollack YOB: 1960   MR: [id number]      Facilitator: Mental Health [facility]  Level of Participation: active  Quality of Participation: appropriate/pleasant, attentive, and cooperative  Interactions with others: appropriate and gave feedback  Mood/Affect: positive  Triggers (if applicable): N/A  Cognition: coherent/clear  Progress: Minimal  Comments: Pt problem is atypical psychosis.   Plan: continue with services

## 2025-08-26 NOTE — GROUP NOTE
Group Topic: Other   Group Date: 8/26/2025  Start Time: 1330  End Time: 1430  Facilitators: DENVER Mcghee   Department: [facility]    Number of Participants: 11   Group Focus[facility] other [facility]  Treatment Modality: Other: Recreation Therapy  Interventions utilized were mental fitness  Purpose: other: fun, elevate mood, increase socialization, enhance self esteem    Name: Ellis Pollack YOB: 1960   MR: [id number]      Facilitator: Recreational Therapist  Level of Participation: minimal  Quality of Participation: passive and quiet  Interactions with others: pt joins group with little encouragement.  Not very focused on group game and is writing on some paper.  Exits group about retirement through to return to his room.   Mood/Affect: not focused  Triggers (if applicable): n/a  Cognition: not focused  Progress: Minimal  Comments: pt problem delusional thought.    Plan: continue with services

## 2025-08-26 NOTE — GROUP NOTE
"Group Topic: Goals   Group Date: 8/26/2025  Start Time: 0730  End Time: 0800  Facilitators: Kim Tang   Department: [facility]     Number of Participants: 10   Group Focus: goals  Treatment Modality: Individual Therapy  Interventions utilized were assignment  Purpose: feelings and insight or knowledge    Name: Ellis Pollack YOB: 1960   MR: [id number]      Facilitator: Mental Health [facility]  Level of Participation: active  Quality of Participation: appropriate/pleasant and engaged  Interactions with others: appropriate  Mood/Affect: appropriate  Triggers (if applicable):   Cognition: coherent/clear  Progress: Significant  Comments: patient wrote \"life a journey - never forget that\"  Plan: continue with services      "

## 2025-08-26 NOTE — GROUP NOTE
Group Topic: Self-Care/Wellness   Group Date: 8/26/2025  Start Time: 1000  End Time: 1050  Facilitators: DENVER Mcghee   Department: [facility]    Number of Participants: 10   Group Focus: other Healthy Living Group  Treatment Modality: Other: Recreation Therapy  Interventions utilized were exploration, group exercise, patient education, problem solving, and support  Purpose: coping skills, maladaptive thinking, feelings, irrational fears, communication skills, insight or knowledge, self-worth, self-care, relapse prevention strategies, and trigger / craving management    Name: Ellis Pollack YOB: 1960   MR: [id number]      Facilitator: Recreational Therapist  Level of Participation: did not attend  Quality of Participation: did not attend  Interactions with others: did not attend  Mood/Affect: did not attend  Triggers (if applicable): n/a  Cognition: did not attend  Progress: None  Comments: pt problem is delusional thought.  Pt did not attend group at this time.  Is focused on writing in his room.  No handout to offer.  Plan: continue with services

## 2025-08-26 NOTE — CARE PLAN
The patient's goals for the shift include No falls    The clinical goals for the shift include Maintain Safety    Over the shift, the patient did make progress toward the following goals. Barriers to progression include wear his shoes continue to wear yellow safety socks. Recommendations to address these barriers include maintain safety.

## 2025-08-27 ASSESSMENT — PAIN SCALES - GENERAL
PAINLEVEL_OUTOF10: 0 - NO PAIN
PAINLEVEL_OUTOF10: 0 - NO PAIN

## 2025-08-27 NOTE — GROUP NOTE
Group Topic: Problem Solving   Group Date: 8/27/2025  Start Time: 1000  End Time: 1050  Facilitators: DENVER Mcghee   Department: [facility]    Number of Participants: 8   Group Focus: other [employer]   Treatment Modality: Other: Recreation Therapy  Interventions utilized were exploration, group exercise, patient education, problem solving, and support  Purpose: coping skills, maladaptive thinking, feelings, irrational fears, communication skills, insight or knowledge, self-worth, self-care, relapse prevention strategies, and trigger / craving management    Name: Ellis Pollack YOB: 1960   MR: [id number]      Facilitator: Recreational Therapist  Level of Participation: minimal  Quality of Participation: passive and quiet  Interactions with others: passive  Mood/Affect: passive  Triggers (if applicable): n/a  Cognition: passive  Progress: Minimal  Comments: pt problem is delusional thought.  Pt joins group after writing on some paper in his room.  Continues to focus on writing during group.  Passive with participation.   Plan: continue with services

## 2025-08-27 NOTE — PROGRESS NOTES
"Ellis Pollack is a 64 y.o. male on day 22 of admission presenting with Atypical psychosis.      Subjective   Chart reviewed and case discussed during interdisciplinary rounds.    Chart was reviewed, and the case was discussed with the interdisciplinary team. Nursing staff reported no acute concerns, noting the patient remains compliant with medications, eating and sleeping well. Recreational therapy staff observed drooling yesterday; however, during today’s face-to-face assessment, no abnormalities were noted, though monitoring will continue.  On interview, the patient was alert and cooperative, though discharge-focused. He denied auditory or visual hallucinations, as well as suicidal or homicidal ideation. He expressed confusion about his current admission, stating that he had called 911 for help but did not understand why this resulted in hospitalization. While disappointed about not yet being discharged, he was receptive to education provided regarding the need for safe discharge planning and stabilization. Denies any side effects from the medication. Reports mood as fine. No reports of Pain.  The patient has a psychiatric history notable for anxiety and psychosis and was admitted this time with psychotic symptoms. At present, he remains stable without acute behavioral concerns, but his presentation and psychiatric history suggest ongoing vulnerability. Continued inpatient admission is recommended for further stabilization, safety monitoring, and disposition planning to ensure a safe and structured discharge.      Objective   Last Recorded Vitals  Blood pressure 110/75, pulse 71, temperature 36.6 °C (97.9 °F), temperature source Temporal, resp. rate 16, height 1.702 m (5' 7\"), weight 65.2 kg (143 lb 11.8 oz), SpO2 96%.       Sleep Log  Estimated \"Sleeping\" Durations   Night Est. Hours   08/12 9.25-9.75   08/13 9.00-9.75   08/14 7.25-8.00   08/15 8.25   08/16 10.00-10.50   08/17 8.75-9.50   08/18 9.25   08/19 " "8.75-9.00   08/20 8.75-9.25   08/21 6.50-6.75   08/22 9.75   08/23 9.25-9.75   08/24 7.00-8.25   08/25 7.75-8.75   08/26 9.00-9.25        Review of Systems    Psychiatric ROS - Adult  Anxiety:  PANCHO due to disorganized thought processes  Depression: PANCHO due to disorganized thought processes  Delirium: PANCHO due to disorganized thought processes  Psychosis: delusions. When asked he denies experiencing AVH  Elizabeth: PANCHO due to disorganized thought processes  Safety Issues: when asked he denies SI, denies HI  Psychiatric ROS Comment: -    Physical Exam  Vitals and nursing note reviewed.   Pulmonary:      Effort: Pulmonary effort is normal.   Neurological:      Mental Status: He is alert.       Mental Status Exam  General: appears older than stated age, disheveled  Appearance: hospital attire (noted he is wearing hospital pants with a pair of shorts on top of them)  Attitude: calm, confused  Behavior: disorganized but cooperative  Motor Activity: No abnormal movements noted  Speech: reduced rate and volume, poorly enunciated at times   Mood: \"fine\"  Affect: mildly constricted    Thought Process:  disorganized, loose associations, tangential  Thought Content: delusions, probable paranoia, when asked he denies SI/HI  Thought Perception: when asked he denies AH/VH  Cognition: alert and oriented to self, place, time, and president  Insight: possible baseline of limited - short term memory deficits, does not recall why he is admitted, extent of his illness  Judgement: possible baseline of poor to fair, patient is intermittently taking medications, nonagitated     Psychiatric Risk Assessment  Violence Risk Assessment: major mental illness, male, substance abuse, and other possible developing dementia  Acute Risk of Harm to Others is Considered: low  Suicide Risk Assessment: age > 65 yrs old, , and male  Protective Factors against Suicide: other denies SI/HI, denies past SA  Acute Risk of Harm to Self is Considered: " moderate    Relevant Results     Results for orders placed or performed during the hospital encounter of 08/05/25 (from the past 96 hours)   Comprehensive metabolic panel   Result Value Ref Range    Glucose 87 74 - 99 mg/dL    Sodium 141 136 - 145 mmol/L    Potassium 4.4 3.5 - 5.3 mmol/L    Chloride 105 98 - 107 mmol/L    Bicarbonate 33 (H) 21 - 32 mmol/L    Anion Gap 7 (L) 10 - 20 mmol/L    Urea Nitrogen 22 6 - 23 mg/dL    Creatinine 0.80 0.50 - 1.30 mg/dL    eGFR >90 >60 mL/min/1.73m*2    Calcium 9.3 8.6 - 10.3 mg/dL    Albumin 4.1 3.4 - 5.0 g/dL    Alkaline Phosphatase 48 33 - 136 U/L    Total Protein 6.4 6.4 - 8.2 g/dL    AST 15 9 - 39 U/L    Bilirubin, Total 0.9 0.0 - 1.2 mg/dL    ALT 26 10 - 52 U/L   Valproic Acid   Result Value Ref Range    Valproic Acid 44 (L) 50 - 100 ug/mL       Assessment & Plan  Atypical psychosis    Seizure disorder (Multi)    MCI (mild cognitive impairment)      Ellis Pollack is a 64 year old M with past psychiatric history of anxiety, psychosis, admitted to [facility] on 8/5/25 with psychotic symptoms.      Whether patient presenting with psychosis, emerging dementia, a combination of the 2, or other organic condition, it will likely require a longitudinal assessment opportunity in a medically monitored supervised setting (like a nursing home).  Patient is not acting dangerously presently, not threatening others, and not threatening himself.  It is possible that further medication adjustments at this time may not modify the risk picture in the acute phase, thus the recommendation for medically monitored setting for longitudinal assessments for an extended period of time. Chart review (limited available content) shows admission in 2021 for confusion, 2023 for new onset seizure disorder. Admitting MMSE 24/30. Consider organic etiology of paranoia/agitation as onset of seizures coincided with change in personality. Will need follow up outpatient with neurology and would benefit  from formal neuropsychiatric testing.    Pt continues to benefit from admission for stabilization, safety, and safe disposition planning. Social work continues to work with family regarding their capabilities to care for patient at home, possible assisted placement. PASSR denied today.     Biological  - continue risperdal 1mg tid for psychosis/agitation/anxiety . Tolerating it well  - continue Depakote ER 24 hr, increase to 750mg for mood stabilization (outpatient neurology provider Kathia Chan CNP agreed with switching Keppra to Depakote to minimize potential neuropsychiatric side effects). No reported SE   - LFT today WNL   - VPA level today 44  - prns available  - medical co-managing pertinences  - c/w daily thiamine and MVI    Psychological  - Provider encourages patient to attend all groups.     Sociological  - Provider encourages patient to work with social work regarding safe discharge plan.        Nutrition/Malnutrition:  The diagnosis of malnutrition has significant impact on risk adjustment, mortality and readmission rates, length of stay and hospital reimbursement.  The below is a representation of nutrition status if evaluated.  If blank, there is no associated malnutrition finding to incorporate per the dietician team:     Goals for discharge include:  Psychiatric condition: to lower acute risk, return to presumed baseline level of functioning, work to identify positive coping skills to manage psychiatric symptoms, allow for reconciliation of medications.  Physical condition: increase daily physical activity, demonstrate interest in completing daily activity, and complete ADL.  Social function: identify protective factors and family supports, increase social interactions on the unit with peers and providing staff, and demonstrate appropriate problem solving skills for engaging after care on discharge.    Total time spent with patient encounter 37 minutes. This includes patient interview, assessment,  extensive chart review, personal review of labs and images as noted above, and formulation of recommendations.     Rae Benitez APRN-CNP, AGPCNP-BC, PMHNP-BC  Psychiatry, [facility]  1* contact: [facility] preferred

## 2025-08-27 NOTE — PROGRESS NOTES
"Nutrition Follow up Note    Nutrition Assessment      Nutrition History:  Energy Intake: Good > 75 %  Food and Nutrient History: continue to eat well    Anthropometrics:  Ht: 170.2 cm (5' 7\"), Wt: 65.2 kg (143 lb 11.8 oz), BMI: 22.51    Weight Change:  Daily Weight  08/13/25 : 65.2 kg (143 lb 11.8 oz)  08/05/25 : 61.2 kg (135 lb) - accurate?   08/05/25 : 65.8 kg (145 lb)  04/10/25 : 68 kg (150 lb)  03/06/25 : 68.4 kg (150 lb 14.4 oz)  04/09/24 : 61.2 kg (135 lb)  02/22/24 : 64.4 kg (142 lb)  02/14/24 : 94.5 kg (208 lb 4.8 oz)     Weight History / % Weight Change: per wt hx, pt with a 15# (10%) wt loss over ~5 months (3/6-8/5). updated wt of 143# on 8/13 - now question if wt of 135# from 8/5 was accurate.  Significant Weight Loss: Yes    Nutrition Focused Physical Exam Findings: defer: not available    Nutrition Significant Labs:  Lab Results   Component Value Date    WBC 9.3 08/05/2025    HGB 14.8 08/05/2025    HCT 43.2 08/05/2025     08/05/2025    ALT 26 08/26/2025    AST 15 08/26/2025     08/26/2025    K 4.4 08/26/2025     08/26/2025    CREATININE 0.80 08/26/2025    BUN 22 08/26/2025    CO2 33 (H) 08/26/2025    TSH 2.44 08/19/2025    HGBA1C 5.4 11/22/2021     Nutrition Specific Medications:  Scheduled Medications[1]  Continuous Medications[2]    Dietary Orders (From admission, onward)       Start     Ordered    08/20/25 1223  Adult diet Cardiac; 70 gm fat; 2 - 3 grams Sodium  Diet effective now        Question Answer Comment   Diet type Cardiac    Fat restriction: 70 gm fat    Sodium restriction: 2 - 3 grams Sodium        08/20/25 1222                  Estimated Needs:   Estimated Energy Needs  Total Energy Estimated Needs in 24 hours (kCal): 1841 kCal  Energy Estimated Needs per kg Body Weight in 24 hours (kCal/kg): 30 kCal/kg  Method for Estimating Needs: actual wt    Estimated Protein Needs  Total Protein Estimated Needs in 24 Hours (g): 74 g  Protein Estimated Needs per kg Body Weight in 24 " Hours (g/kg): 1.2 g/kg  Method for Estimating 24 Hour Protein Needs: actual wt    Estimated Fluid Needs  Method for Estimating 24 Hour Fluid Needs: 1 ml/kcal or per MD       Nutrition Diagnosis   Nutrition Diagnosis:  Malnutrition Diagnosis  Patient has Malnutrition Diagnosis: No    Nutrition Diagnosis  Patient has Nutrition Diagnosis: Yes  Diagnosis Status (1): Resolved  Nutrition Diagnosis 1: Unintended weight loss  Related to (1): decreased ability to consume/tolerate sufficient energy  As Evidenced by (1): 15# (10%) wt loss over ~5 months       Nutrition Interventions/Recommendations   Nutrition Interventions and Recommendations:  Nutrition Prescription: Nutrition prescription for oral nutrition    Nutrition Recommendations:  Individualized Nutrition Prescription Provided for : continue cardiac diet    Nutrition Interventions/Goals:   Food and/or Nutrient Delivery Interventions  Interventions: Meals and snacks  Meals and Snacks: Mineral-modified diet, Fat-modified diet  Goal: provide as ordered    Education Documentation  No documentation found.              Nutrition Monitoring and Evaluation   Monitoring/Evaluation:   Food/Nutrient Related History Monitoring  Monitoring and Evaluation Plan: Estimated Energy Intake  Estimated Energy Intake: Energy intake greater or equal to 75% of estimated energy needs    Anthropometric Measurements  Monitoring and Evaluation Plan: Body weight  Body Weight: Body weight - Maintain stable weight    Goal Status: Goal(s) achieved    Follow Up  Time Spent (min): 20 minutes  Last Date of Nutrition Visit: 08/27/25  Nutrition Follow-Up Needed?: 7-10 days  Follow up Comment: 9/5          [1] calcium carbonate, 1,250 mg of calcium carbonate, oral, Daily  divalproex, 750 mg, oral, Daily  multivitamin with minerals, 1 tablet, oral, Daily  risperiDONE, 1 mg, oral, TID  thiamine, 100 mg, oral, Daily     [2]

## 2025-08-27 NOTE — GROUP NOTE
Group Topic: Other   Group Date: 8/27/2025  Start Time: 1340  End Time: 1500  Facilitators: DENVER Mcghee   Department: [facility]    Number of Participants: 8   Group Focus: other [employer]  Treatment Modality: Other: Recreation Therapy  Interventions utilized were mental fitness  Purpose: other: fun, elevate mood, increase socialization, enhance self esteem, stimulate memory    Name: Ellis Pollack YOB: 1960   MR: [id number]      Facilitator: Recreational Therapist  Level of Participation: did not attend  Quality of Participation: did not attend  Interactions with others: did not attend  Mood/Affect: did not attend  Triggers (if applicable): n/a  Cognition: did not attend  Progress: None  Comments: pt problem is delusional thought.  Pt continues to focus on writing on paper in his room.  No handout to offer.  Plan: continue with services

## 2025-08-27 NOTE — PROGRESS NOTES
[facility]: [facility]  PROGRESS NOTE      Ellis Pollack is a 64 y.o. male that is being seen  today for follow up at [facility].  Subjective   Pt. Is being seen for follow up at [facility].  Patient has been stable.  Vital signs are stable.  Patient is  pleasantly confused.    Patient's lab work reviewed.    Patient's lab work has been stable.      ROS  Negative for fever or chills  Negative for sore throat, ear pain, nasal discharge  Negative for cough, shortness of breath or wheezing  Negative for chest pain, palpitations, swelling of legs  Negative for abdominal pain, constipation, diarrhea, blood in the stools  Negative for urinary complaints  Negative for headache, dizziness, weakness or numbness  Negative for joint pain  Positive for dementia and anxiety  All other systems reviewed and were negative   Vitals:    08/27/25 0700   BP: 110/75   Pulse: 71   Resp: 16   Temp: 36.6 °C (97.9 °F)   SpO2: 96%      Vitals:    08/13/25 1900   Weight: 65.2 kg (143 lb 11.8 oz)     Body mass index is 22.51 kg/m².  Physical Exam  Constitutional: Patient does not appear to be in any acute distress  Cardiovascular: RRR, S1/S2, no murmurs, rubs, or gallops, radial pulses +2, no edema of extremities  Pulmonary: CTAB, no respiratory distress.  Abdomen: +BS, soft, non-tender, nondistended, no guarding or rebound, no masses noted  MSK: No joint swelling, normal movements of all extremities. Range of motion- normal.  Skin- No lesions, contusions, or erythema.  Peripheral puslses palpable bilaterally 2+  Neuro: AAO X1-2, Cranial nerves 2-12 grossly intact,DTR 2+ in all 4 limbs       LABS   [unfilled]  Lab Results   Component Value Date    GLUCOSE 87 08/26/2025    CALCIUM 9.3 08/26/2025     08/26/2025    K 4.4 08/26/2025    CO2 33 (H) 08/26/2025     08/26/2025    BUN 22 08/26/2025    CREATININE 0.80 08/26/2025     Lab Results   Component Value Date    ALT 26 08/26/2025    AST 15 08/26/2025    ALKPHOS  "48 08/26/2025    BILITOT 0.9 08/26/2025     Lab Results   Component Value Date    WBC 9.3 08/05/2025    HGB 14.8 08/05/2025    HCT 43.2 08/05/2025    MCV 94 08/05/2025     08/05/2025     No results found for: \"CHOL\"  No results found for: \"HDL\"  No results found for: \"LDLCALC\"  No results found for: \"TRIG\"  Lab Results   Component Value Date    HGBA1C 5.4 11/22/2021     Other labs not included in the list above were reviewed either before or during this encounter.    History    Medical History[1]  Surgical History[2]  Family History[3]  Allergies[4]  Medications Ordered Prior to Encounter[5]  Immunization History   Administered Date(s) Administered    Moderna SARS-CoV-2 Vaccination 04/10/2021, 05/08/2021, 12/28/2021    Pfizer COVID-19 vaccine, 12 years and older, (30mcg/0.3mL) (Comirnaty) 12/06/2023, 09/29/2024    Pfizer COVID-19 vaccine, bivalent, age 12 years and older (30 mcg/0.3 mL) 12/20/2022    Tdap vaccine, age 7 year and older (BOOSTRIX, ADACEL) 02/22/2024     Patient's medical history was reviewed and updated either before or during this encounter.  ASSESSMENT / PLAN:  Active Hospital Problems    MCI (mild cognitive impairment)      Seizure disorder (Multi)      *Atypical psychosis       Patient is being seen for follow-up at [facility].  Patient has seizure disorder and is on Keppra.  Will continue with the same dose.  Patient's vital signs are stable.  Patient is being seen by [facility] team for atypical psychosis.  Patient is pleasantly confused .       Pierce Murray MD                        [1] History reviewed. No pertinent past medical history.  [2] History reviewed. No pertinent surgical history.  [3] No family history on file.  [4] No Known Allergies  [5]   No current facility-administered medications on file prior to encounter.     Current Outpatient Medications on File Prior to Encounter   Medication Sig Dispense Refill    calcium carbonate 500 mg calcium (1,250 mg) chewable tablet " Chew and swallow 1 tablet (500 mg of elemental calcium) once daily.      levETIRAcetam (Keppra) 500 mg tablet Take 1 tablet (500 mg) by mouth 2 times a day. 60 tablet 11    multivitamin tablet Take 1 tablet by mouth once daily.

## 2025-08-27 NOTE — GROUP NOTE
Group Topic: Goals   Group Date: 8/27/2025  Start Time: 0730  End Time: 0800  Facilitators: Will Thomas   Department: [facility]     Number of Participants: 19   Group Focus: goals  Treatment Modality: Individual Therapy  Interventions utilized were assignment  Purpose: feelings    Name: Ellis Pollack YOB: 1960   MR: [id number]      Facilitator: [facility]  Level of Participation: moderate  Quality of Participation: appropriate/pleasant, attentive, and engaged  Interactions with others: appropriate  Mood/Affect: appropriate  Cognition: coherent/clear and goal directed  Progress: Moderate  Comments: patient was able to make progress towards treatment of atypical psychosis  Plan: continue with services

## 2025-08-27 NOTE — CARE PLAN
The patient's goals for the shift include No falls    The clinical goals for the shift include maintain safety    Over the shift, the patient did make progress toward the following goals. Barriers to progression include continue to get 8hrs of sleep at night, wear yellow safety socks. Recommendations to address these barriers include everything listed above to maintain safety.

## 2025-08-27 NOTE — GROUP NOTE
Group Topic: Goals   Group Date: 8/26/2025  Start Time: 2004  End Time: 2024  Facilitators: Karli Edward   Department: [facility]    Number of Participants: 10   Group Focus: goals  Treatment Modality: Other: PCA  Interventions utilized were reminiscence  Purpose: feelings and self-worth    Name: Ellis Pollack YOB: 1960   MR: [id number]      Facilitator: [facility]  Level of Participation: active  Quality of Participation: appropriate/pleasant, attentive, and cooperative  Interactions with others: appropriate and gave feedback  Mood/Affect: positive  Triggers (if applicable): N/A  Cognition: coherent/clear  Progress: Moderate  Comments: Pt problem is atypical psychosis.   Plan: continue with services

## 2025-08-27 NOTE — GROUP NOTE
Group Topic: Music Therapy   Group Date: 8/27/2025  Start Time: 1100  End Time: 1155  Facilitators: Vesna Hennessy   Department: [facility]    Number of Participants: 12   Group Focus: communication/socialization, daily focus, impulsivity, and reminiscence  Treatment Modality: Music Therapy  Interventions Utilized were: other musical game, reminiscence, sharing/discussion, and songwriting/composition    Pts were prompted to share a favorite memory related to a concert/ live music as part of the introduction activity. Pts then helped MT in writing a song that incorporated everyone there. Finally, pts played music bingo with a large variety of songs to further engage their memories and cognition.   Name: Ellis Pollack YOB: 1960   MR: [id number]      Level of Participation: active  Quality of Participation: appropriate/pleasant  Interactions with others: appropriate  Mood/Affect: brightens with interaction and positive  Cognition, Pre Treatment: attentive  Cognition, Post Treatment: attentive  Progress: Moderate  Plan: continue with services    Pt able to follow all conversations and prompts, drooling throughout, a bit lost at the close of session.

## 2025-08-28 ASSESSMENT — PAIN SCALES - GENERAL
PAINLEVEL_OUTOF10: 0 - NO PAIN
PAINLEVEL_OUTOF10: 0 - NO PAIN

## 2025-08-28 ASSESSMENT — PAIN - FUNCTIONAL ASSESSMENT: PAIN_FUNCTIONAL_ASSESSMENT: 0-10

## 2025-08-28 ASSESSMENT — ACTIVITIES OF DAILY LIVING (ADL): EFFECT OF PAIN ON DAILY ACTIVITIES: MINIMAL

## 2025-08-28 ASSESSMENT — PAIN DESCRIPTION - DESCRIPTORS: DESCRIPTORS: PATIENT UNABLE TO DESCRIBE

## 2025-08-28 NOTE — GROUP NOTE
Group Topic: Feeling Awareness/Expression   Group Date: 8/27/2025  Start Time: 2000  End Time: 2015  Facilitators: Meseret Yang   Department: [facility]    Number of Participants: 12   Group Focus: check in  Treatment Modality: Interpersonal Therapy  Interventions utilized were mental fitness  Purpose: feelings and self-care    Name: Ellis Pollack YOB: 1960   MR: [id number]      Facilitator: [facility]  Level of Participation: minimal  Quality of Participation: oppositional and withdrawn  Interactions with others: appropriate  Mood/Affect: irritable  Triggers (if applicable): none  Cognition: confused, obsessive, and processing slowly  Progress: Minimal  Comments: patient presented with atypical psychosis  Plan: continue with services

## 2025-08-28 NOTE — GROUP NOTE
Group Topic: Self-Care/Wellness   Group Date: 8/28/2025  Start Time: 1030  End Time: 1130  Facilitators: DENVER Mcghee   Department: [facility]    Number of Participants: 11   Group Focus: other Positive Steps for Well Being  Treatment Modality: Other: Recreation Therapy  Interventions utilized were exploration, group exercise, patient education, problem solving, and support  Purpose: coping skills, maladaptive thinking, feelings, irrational fears, communication skills, insight or knowledge, self-worth, self-care, relapse prevention strategies, and trigger / craving management    Name: Ellis Pollack YOB: 1960   MR: [id number]      Facilitator: Recreational Therapist  Level of Participation: minimal  Quality of Participation: passive and quiet  Interactions with others: passive  Mood/Affect: passive  Triggers (if applicable): n/a  Cognition: passive  Progress: None  Comments: pt problem is delusional thought.  Pt joins group with little encouragement.  Comments made during group are unrelated to group topic.  Pleasantly confused.   Plan: continue with services

## 2025-08-28 NOTE — GROUP NOTE
Group Topic: Goals   Group Date: 8/28/2025  Start Time: 0730  End Time: 0800  Facilitators: Betsy Herndon   Department: [facility]    Number of Participants: 7   Group Focus: goals  Treatment Modality: Interpersonal Therapy  Interventions utilized were assignment  Purpose: feelings    Name: Ellis Pollack YOB: 1960   MR: [id number]      Facilitator: [facility]  Level of Participation: did not attend  Quality of Participation: Patient did not attend  Interactions with others: Patient did not attend  Mood/Affect: Patient did not attend  Triggers (if applicable): n/a  Cognition: Patient did not attend  Progress: Patient did not attend  Comments: Patient problem is Atypical psychosis.  Plan: continue with services

## 2025-08-28 NOTE — GROUP NOTE
Group Topic: Art Creative   Group Date: 8/28/2025  Start Time: 1330  End Time: 1440  Facilitators: DENVER Mcghee   Department: [facility]IRT[facility]    Number of Participants: 8   Group Focus: other Art Group  Treatment Modality: Other: Recreation Therapy  Interventions utilized were exploration, mental fitness, and story telling  Purpose: feelings and other: fun, elevate mood, increase socialization, enhance self esteem    Name: Ellis Pollack YOB: 1960   MR: [id number]      Facilitator: Recreational Therapist  Level of Participation: did not attend  Quality of Participation: did not attend  Interactions with others: did not attend  Mood/Affect: did not attend  Triggers (if applicable): n/a  Cognition: did not attend  Progress: None  Comments: pt problem is delusional thought.  Pt is soundly sleeping in his room.  No handout to offer.  Plan: continue with services

## 2025-08-28 NOTE — PROGRESS NOTES
"LSW returned call to Mak Betancourt from [facility] (pt's outpatient provider of counseling services) regarding pt. Pt had called him this morning and Mr. Betancourt did not understand what pt was asking for. LSW explained pt's current status and where things are with pt's discharge plan. Mr. Betancourt is the Clinical Director of [facility] and received the phone call from pt due to pt's therapist being on medical leave. Mr. Betancourt has great concern for pt returning to the community and his ability to manage his ADL's due to level of orientation.     LSW was sought out by pt inquiring as to what is going on with his \"case\". LSW discussed with pt that the [facility] would like for him to participate in an outpatient treatment program. Pt responded by saying \"I want them to participate in an outpatient treatment program\". Pt went on to ramble with mumbled and disorganized speech. Pt did sign an GOMEZ for [facility] to obtain records, as they have been pt's outpatient provider for counseling. LSW will contact them tomorrow for more information relating to pt's history.     Grace Murphy, MARGARITA LSW   "

## 2025-08-28 NOTE — CARE PLAN
The patient's goals for the shift include No falls    The clinical goals for the shift include maintain safety

## 2025-08-28 NOTE — PROGRESS NOTES
"Ellis Pollack is a 64 y.o. male on day 23 of admission presenting with Atypical psychosis.      Subjective     Chart reviewed and case discussed with the interdisciplinary team. During today’s face-to-face encounter, the patient reported no changes since yesterday. He presented as alert and oriented, stating that he feels safe overall though he described “feeling strange being here.” He denied experiencing any auditory or visual hallucinations, as well as any suicidal or homicidal ideation. The patient did acknowledge ongoing anxiety related to uncertainty about his discharge date, but otherwise described his mood as stable. He reported that his sleep has been adequate and his appetite remains fair. The patient was cooperative and engaged appropriately during the assessment, and no acute psychiatric or safety concerns were identified at this time.    Objective   Last Recorded Vitals  Blood pressure 130/86, pulse 77, temperature 36.8 °C (98.2 °F), temperature source Temporal, resp. rate 17, height 1.702 m (5' 7\"), weight 65.2 kg (143 lb 11.8 oz), SpO2 98%.       Sleep Log  Estimated \"Sleeping\" Durations   Night Est. Hours   08/14 7.25-8.00   08/15 8.25   08/16 10.00-10.50   08/17 8.75-9.50   08/18 9.25   08/19 8.75-9.00   08/20 8.75-9.25   08/21 6.50-6.75   08/22 9.75   08/23 9.25-9.75   08/24 7.00-8.25   08/25 7.75-8.75   08/26 9.00-9.25   08/27 7.75-8.50   08/28 0.00        Review of Systems    Psychiatric ROS - Adult  Anxiety:  PANCHO due to disorganized thought processes  Depression: PANCHO due to disorganized thought processes  Delirium: PANCHO due to disorganized thought processes  Psychosis: delusions. When asked he denies experiencing AVH  Elizabeth: PANCHO due to disorganized thought processes  Safety Issues: when asked he denies SI, denies HI  Psychiatric ROS Comment: -    Physical Exam  Vitals and nursing note reviewed.   Pulmonary:      Effort: Pulmonary effort is normal.   Neurological:      Mental Status: He is alert. " "      Mental Status Exam  General: appears older than stated age, disheveled  Appearance: hospital attire (noted he is wearing hospital pants with a pair of shorts on top of them)  Attitude: calm, confused  Behavior: disorganized but cooperative  Motor Activity: No abnormal movements noted  Speech: reduced rate and volume, poorly enunciated at times   Mood: \"Same as yesterday\"  Affect: mildly constricted    Thought Process:  disorganized, loose associations, tangential  Thought Content: delusions, probable paranoia, when asked he denies SI/HI  Thought Perception: when asked he denies AH/VH  Cognition: alert and oriented to self, place, time, and president  Insight: possible baseline of limited - short term memory deficits, does not recall why he is admitted, extent of his illness  Judgement: possible baseline of poor to fair, patient is intermittently taking medications, nonagitated     Psychiatric Risk Assessment  Violence Risk Assessment: major mental illness, male, substance abuse, and other possible developing dementia  Acute Risk of Harm to Others is Considered: low  Suicide Risk Assessment: age > 65 yrs old, , and male  Protective Factors against Suicide: other denies SI/HI, denies past SA  Acute Risk of Harm to Self is Considered: moderate    Relevant Results     Results for orders placed or performed during the hospital encounter of 08/05/25 (from the past 96 hours)   Comprehensive metabolic panel   Result Value Ref Range    Glucose 87 74 - 99 mg/dL    Sodium 141 136 - 145 mmol/L    Potassium 4.4 3.5 - 5.3 mmol/L    Chloride 105 98 - 107 mmol/L    Bicarbonate 33 (H) 21 - 32 mmol/L    Anion Gap 7 (L) 10 - 20 mmol/L    Urea Nitrogen 22 6 - 23 mg/dL    Creatinine 0.80 0.50 - 1.30 mg/dL    eGFR >90 >60 mL/min/1.73m*2    Calcium 9.3 8.6 - 10.3 mg/dL    Albumin 4.1 3.4 - 5.0 g/dL    Alkaline Phosphatase 48 33 - 136 U/L    Total Protein 6.4 6.4 - 8.2 g/dL    AST 15 9 - 39 U/L    Bilirubin, Total 0.9 0.0 - " 1.2 mg/dL    ALT 26 10 - 52 U/L   Valproic Acid   Result Value Ref Range    Valproic Acid 44 (L) 50 - 100 ug/mL       Assessment & Plan  Atypical psychosis    Seizure disorder (Multi)    MCI (mild cognitive impairment)      Ellis Pollack is a 64 year old M with past psychiatric history of anxiety, psychosis, admitted to [facility] on 8/5/25 with psychotic symptoms.      Whether patient presenting with psychosis, emerging dementia, a combination of the 2, or other organic condition, it will likely require a longitudinal assessment opportunity in a medically monitored supervised setting (like a nursing home).  Patient is not acting dangerously presently, not threatening others, and not threatening himself.  It is possible that further medication adjustments at this time may not modify the risk picture in the acute phase, thus the recommendation for medically monitored setting for longitudinal assessments for an extended period of time. Chart review (limited available content) shows admission in 2021 for confusion, 2023 for new onset seizure disorder. Admitting MMSE 24/30. Consider organic etiology of paranoia/agitation as onset of seizures coincided with change in personality. Will need follow up outpatient with neurology and would benefit from formal neuropsychiatric testing.    Pt continues to benefit from admission for stabilization, safety, and safe disposition planning. Social work continues to work with family regarding their capabilities to care for patient at home, possible assisted placement. PASSR denied.     Biological  - continue risperdal 1mg tid for psychosis/agitation/anxiety . Tolerating it well  - continue Depakote ER 24 hr, 750mg for mood stabilization (outpatient neurology provider Kathia Chan CNP agreed with switching Keppra to Depakote to minimize potential neuropsychiatric side effects). No reported SE   - LFT today WNL   - VPA level today 44  - prns available  - medical co-managing  pertinences  - c/w daily thiamine and MVI    Psychological  - Provider encourages patient to attend all groups.     Sociological  - Provider encourages patient to work with social work regarding safe discharge plan.        Nutrition/Malnutrition:  The diagnosis of malnutrition has significant impact on risk adjustment, mortality and readmission rates, length of stay and hospital reimbursement.  The below is a representation of nutrition status if evaluated.  If blank, there is no associated malnutrition finding to incorporate per the dietician team:     Goals for discharge include:  Psychiatric condition: to lower acute risk, return to presumed baseline level of functioning, work to identify positive coping skills to manage psychiatric symptoms, allow for reconciliation of medications.  Physical condition: increase daily physical activity, demonstrate interest in completing daily activity, and complete ADL.  Social function: identify protective factors and family supports, increase social interactions on the unit with peers and providing staff, and demonstrate appropriate problem solving skills for engaging after care on discharge.    Total time spent with patient encounter 27 minutes. This includes patient interview, assessment, extensive chart review, personal review of labs and images as noted above, and formulation of recommendations.     SÁNCHEZ Faye-BC  Psychiatry, [facility]  1* contact: [employer] kasi preferred

## 2025-08-28 NOTE — CARE PLAN
The patient's goals for the shift include no falls    The clinical goals for the shift include maintain safety    Over the shift, the patient did make progress toward the following goals. Barriers to progression include some lack of insight into after care. Recommendations to address these barriers include education & reality orientation.      Problem: Safety - Adult  Goal: Free from fall injury  Outcome: Progressing

## 2025-08-28 NOTE — NURSING NOTE
ALLY NOTE     Problem:  Delusions     Behavior:  In group room, consuming snack.  Took HS meds w/o difficulty. Calm and cooperative    Appetite/Meals: HS snack

## 2025-08-28 NOTE — PROGRESS NOTES
[facility]: [facility]  PROGRESS NOTE      Ellis Pollack is a 64 y.o. male that is being seen  today for follow up at [facility].  Subjective   Pt. Is being seen for follow up at [facility].  Patient has been stable.  Vital signs are stable.  Patient is  pleasantly confused.    Patient's lab work reviewed.    Patient's lab work has been stable.      ROS  Negative for fever or chills  Negative for sore throat, ear pain, nasal discharge  Negative for cough, shortness of breath or wheezing  Negative for chest pain, palpitations, swelling of legs  Negative for abdominal pain, constipation, diarrhea, blood in the stools  Negative for urinary complaints  Negative for headache, dizziness, weakness or numbness  Negative for joint pain  Positive for dementia and anxiety  All other systems reviewed and were negative   Vitals:    08/28/25 0700   BP: 130/86   Pulse: 77   Resp: 17   Temp: 36.8 °C (98.2 °F)   SpO2: 98%      Vitals:    08/13/25 1900   Weight: 65.2 kg (143 lb 11.8 oz)     Body mass index is 22.51 kg/m².  Physical Exam  Constitutional: Patient does not appear to be in any acute distress  Cardiovascular: RRR, S1/S2, no murmurs, rubs, or gallops, radial pulses +2, no edema of extremities  Pulmonary: CTAB, no respiratory distress.  Abdomen: +BS, soft, non-tender, nondistended, no guarding or rebound, no masses noted  MSK: No joint swelling, normal movements of all extremities. Range of motion- normal.  Skin- No lesions, contusions, or erythema.  Peripheral puslses palpable bilaterally 2+  Neuro: AAO X1-2, Cranial nerves 2-12 grossly intact,DTR 2+ in all 4 limbs       LABS   [unfilled]  Lab Results   Component Value Date    GLUCOSE 87 08/26/2025    CALCIUM 9.3 08/26/2025     08/26/2025    K 4.4 08/26/2025    CO2 33 (H) 08/26/2025     08/26/2025    BUN 22 08/26/2025    CREATININE 0.80 08/26/2025     Lab Results   Component Value Date    ALT 26 08/26/2025    AST 15 08/26/2025    ALKPHOS  "48 08/26/2025    BILITOT 0.9 08/26/2025     Lab Results   Component Value Date    WBC 9.3 08/05/2025    HGB 14.8 08/05/2025    HCT 43.2 08/05/2025    MCV 94 08/05/2025     08/05/2025     No results found for: \"CHOL\"  No results found for: \"HDL\"  No results found for: \"LDLCALC\"  No results found for: \"TRIG\"  Lab Results   Component Value Date    HGBA1C 5.4 11/22/2021     Other labs not included in the list above were reviewed either before or during this encounter.    History    Medical History[1]  Surgical History[2]  Family History[3]  Allergies[4]  Medications Ordered Prior to Encounter[5]  Immunization History   Administered Date(s) Administered    Moderna SARS-CoV-2 Vaccination 04/10/2021, 05/08/2021, 12/28/2021    Pfizer COVID-19 vaccine, 12 years and older, (30mcg/0.3mL) (Comirnaty) 12/06/2023, 09/29/2024    Pfizer COVID-19 vaccine, bivalent, age 12 years and older (30 mcg/0.3 mL) 12/20/2022    Tdap vaccine, age 7 year and older (BOOSTRIX, ADACEL) 02/22/2024     Patient's medical history was reviewed and updated either before or during this encounter.  ASSESSMENT / PLAN:  Active Hospital Problems    MCI (mild cognitive impairment)      Seizure disorder (Multi)      *Atypical psychosis       Patient is being seen for follow-up at [facility].  Patient has seizure disorder and is on Keppra.  Will continue with the same dose.  Patient's vital signs are stable.  Patient is being seen by [facility] team for atypical psychosis.  Patient is pleasantly confused .       Pierce Murray MD                          [1] History reviewed. No pertinent past medical history.  [2] History reviewed. No pertinent surgical history.  [3] No family history on file.  [4] No Known Allergies  [5]   No current facility-administered medications on file prior to encounter.     Current Outpatient Medications on File Prior to Encounter   Medication Sig Dispense Refill    calcium carbonate 500 mg calcium (1,250 mg) chewable tablet " Chew and swallow 1 tablet (500 mg of elemental calcium) once daily.      levETIRAcetam (Keppra) 500 mg tablet Take 1 tablet (500 mg) by mouth 2 times a day. 60 tablet 11    multivitamin tablet Take 1 tablet by mouth once daily.

## 2025-08-29 ASSESSMENT — PAIN SCALES - GENERAL
PAINLEVEL_OUTOF10: 0 - NO PAIN
PAINLEVEL_OUTOF10: 0 - NO PAIN

## 2025-08-29 NOTE — GROUP NOTE
Group Topic: Other   Group Date: 8/29/2025  Start Time: 1330  End Time: 1430  Facilitators: DENVER Mcghee   Department: [facility] VIRTUAL    Number of Participants: 10   Group Focus: other Mind Joggers  Treatment Modality: Other: Recreation Therapy  Interventions utilized were mental fitness  Purpose: other: fun, elevate mood, increase socialization, enhance self esteem, stimulate memory    Name: Ellis Pollack YOB: 1960   MR: [id number]      Facilitator: Recreational Therapist  Level of Participation: did not attend  Quality of Participation: did not attend  Interactions with others: did not attend  Mood/Affect: did not attend  Triggers (if applicable): n/a  Cognition: did not attend  Progress: None  Comments: pt problem is delusional thought.  Pt is using the phone at this time.  No handout to offer.  Plan: continue with services

## 2025-08-29 NOTE — PROGRESS NOTES
Ellis Pollack is a 64 y.o. male on day 24 of admission presenting with Atypical psychosis.      Subjective     Chart reviewed and the patient’s case was discussed with the interdisciplinary team. Overnight, nursing reported the patient refused his dose of risperidone. This morning, he was initially resistant and picked at his medications, but after encouragement and education from the RN, he ultimately accepted all prescribed doses. He has been eating all meals and sleeping well but did not participate in group therapy sessions. Nursing again observed drooling, raising concern for possible medication-related side effects or swallowing difficulty, and a swallow evaluation has been ordered.    During our face-to-face encounter, the patient was first met in the hallway, where he shared  letters he described as love letters written to his wife, Em, to whom he reported being  for more than 35 years.  He was able to recall her phone number accurately but demonstrated some confusion regarding which hospital he was in, representing a slight decline in orientation compared to yesterday. However, he was able to recall other important details, including dates and his birthday. When encouraged to continue his prescribed medications to help with his clarity, the patient stated he did not need them. After further discussion, he located a folder with the facility name and demonstrated that he did, in fact, know where he was, though his insight into his treatment needs remains limited.  The patient denied suicidal ideation, homicidal ideation, and auditory or visual hallucinations. He expressed frustration, stating he believes he is being held without clear explanation and does not fully understand the purpose of his admission. He was agreeable, however, to the plan for speech therapy evaluation to assess for swallowing safety.  At this time, no acute psychiatric concerns are identified. The patient would benefit from  "continued admission for stabilization, ongoing monitoring, and safe disposition planning.    Per speech therapy this afternoon stated that no further intervention was indicated at this time     \"Bedside swallow evaluation completed. Recommend continue regular/thin textures.   Compensatory Swallowing Strategies: Upright 90 degrees as possible for all oral intake, Small bites/sips, Other (Comment), Alternate solids and liquids (extra gravies/sauces as needed)  Medication Administration Recommendations: Whole, With Liquid'     Objective   Last Recorded Vitals  Blood pressure 111/66, pulse 74, temperature 37 °C (98.6 °F), temperature source Temporal, resp. rate 17, height 1.702 m (5' 7\"), weight 65.2 kg (143 lb 11.8 oz), SpO2 99%.       Sleep Log  Estimated \"Sleeping\" Durations   Night Est. Hours   08/14 7.25-8.00   08/15 8.25   08/16 10.00-10.50   08/17 8.75-9.50   08/18 9.25   08/19 8.75-9.00   08/20 8.75-9.25   08/21 6.50-6.75   08/22 9.75   08/23 9.25-9.75   08/24 7.00-8.25   08/25 7.75-8.75   08/26 9.00-9.25   08/27 7.75-8.50   08/28 7.25-7.75        Review of Systems    Psychiatric ROS - Adult  Anxiety:  PANCHO due to disorganized thought processes  Depression: PANCHO due to disorganized thought processes  Delirium: PANCHO due to disorganized thought processes  Psychosis: delusions. When asked he denies experiencing AVH  Elizabeth: PANCHO due to disorganized thought processes  Safety Issues: when asked he denies SI, denies HI  Psychiatric ROS Comment: -    Physical Exam  Vitals and nursing note reviewed.   Pulmonary:      Effort: Pulmonary effort is normal.   Neurological:      Mental Status: He is alert.       Mental Status Exam  General: appears older than stated age, disheveled  Appearance: hospital attire (noted he is wearing hospital pants with a pair of shorts on top of them)  Attitude: calm, confused  Behavior: disorganized but cooperative  Motor Activity: No abnormal movements noted  Speech: reduced rate and volume, " "poorly enunciated at times   Mood: \"I am doing ok\"  Affect: mildly constricted    Thought Process:  disorganized, loose associations, tangential  Thought Content: delusions, probable paranoia, when asked he denies SI/HI  Thought Perception: when asked he denies AH/VH  Cognition: alert and oriented to self, place, time, and president  Insight: possible baseline of limited - short term memory deficits, does not recall why he is admitted, extent of his illness  Judgement: possible baseline of poor to fair, patient is intermittently taking medications, nonagitated     Psychiatric Risk Assessment  Violence Risk Assessment: major mental illness, male, substance abuse, and other possible developing dementia  Acute Risk of Harm to Others is Considered: low  Suicide Risk Assessment: age > 65 yrs old, , and male  Protective Factors against Suicide: other denies SI/HI, denies past SA  Acute Risk of Harm to Self is Considered: moderate    Relevant Results     Results for orders placed or performed during the hospital encounter of 08/05/25 (from the past 96 hours)   Comprehensive metabolic panel   Result Value Ref Range    Glucose 87 74 - 99 mg/dL    Sodium 141 136 - 145 mmol/L    Potassium 4.4 3.5 - 5.3 mmol/L    Chloride 105 98 - 107 mmol/L    Bicarbonate 33 (H) 21 - 32 mmol/L    Anion Gap 7 (L) 10 - 20 mmol/L    Urea Nitrogen 22 6 - 23 mg/dL    Creatinine 0.80 0.50 - 1.30 mg/dL    eGFR >90 >60 mL/min/1.73m*2    Calcium 9.3 8.6 - 10.3 mg/dL    Albumin 4.1 3.4 - 5.0 g/dL    Alkaline Phosphatase 48 33 - 136 U/L    Total Protein 6.4 6.4 - 8.2 g/dL    AST 15 9 - 39 U/L    Bilirubin, Total 0.9 0.0 - 1.2 mg/dL    ALT 26 10 - 52 U/L   Valproic Acid   Result Value Ref Range    Valproic Acid 44 (L) 50 - 100 ug/mL       Assessment & Plan  Atypical psychosis    Seizure disorder (Multi)    MCI (mild cognitive impairment)      Ellis Pollack is a 64 year old M with past psychiatric history of anxiety, psychosis, admitted to  " [facility] on 8/5/25 with psychotic symptoms.      Whether patient presenting with psychosis, emerging dementia, a combination of the 2, or other organic condition, it will likely require a longitudinal assessment opportunity in a medically monitored supervised setting (like a nursing home).  Patient is not acting dangerously presently, not threatening others, and not threatening himself.  It is possible that further medication adjustments at this time may not modify the risk picture in the acute phase, thus the recommendation for medically monitored setting for longitudinal assessments for an extended period of time. Chart review (limited available content) shows admission in 2021 for confusion, 2023 for new onset seizure disorder. Admitting MMSE 24/30. Consider organic etiology of paranoia/agitation as onset of seizures coincided with change in personality. Will need follow up outpatient with neurology and would benefit from formal neuropsychiatric testing.    Pt continues to benefit from admission for stabilization, safety, and safe disposition planning. Social work continues to work with family regarding their capabilities to care for patient at home, possible assisted placement. PASSR denied.     Biological  - continue risperdal 1mg tid for psychosis/agitation/anxiety . Tolerating it well  - continue Depakote ER 24 hr, 750mg for mood stabilization (outpatient neurology provider Kathai Chan CNP agreed with switching Keppra to Depakote to minimize potential neuropsychiatric side effects). No reported SE   - LFT today WNL   - VPA level today 44  - prns available  - medical co-managing pertinences  - c/w daily thiamine and MVI    Psychological  - Provider encourages patient to attend all groups.     Sociological  - Provider encourages patient to work with social work regarding safe discharge plan.        Nutrition/Malnutrition:  The diagnosis of malnutrition has significant impact on risk adjustment, mortality  and readmission rates, length of stay and hospital reimbursement.  The below is a representation of nutrition status if evaluated.  If blank, there is no associated malnutrition finding to incorporate per the dietician team:     Goals for discharge include:  Psychiatric condition: to lower acute risk, return to presumed baseline level of functioning, work to identify positive coping skills to manage psychiatric symptoms, allow for reconciliation of medications.  Physical condition: increase daily physical activity, demonstrate interest in completing daily activity, and complete ADL.  Social function: identify protective factors and family supports, increase social interactions on the unit with peers and providing staff, and demonstrate appropriate problem solving skills for engaging after care on discharge.    Total time spent with patient encounter 31 minutes. This includes patient interview, assessment, extensive chart review, personal review of labs and images as noted above, and formulation of recommendations.     TAWANA FayeP-BC  Psychiatry, [facility]  1* contact: qLearning preferred

## 2025-08-29 NOTE — GROUP NOTE
Group Topic: Music Therapy   Group Date: 8/29/2025  Start Time: 1100  End Time: 1200  Facilitators: Trinh Millard   Department: [facility] EXPRESSIVE THER VIRTUAL    Number of Participants: 10   Group Focus: communication/socialization, expressive outlet, opportunity for choice/control, and rapport building  Treatment Modality: Music Therapy  Interventions Utilized were: empathic listening/validating emotions, group exercise, passive music engagement, and sharing/discussion    Pt participated in group music discussion and sharing related to themes of empowerment/confidence. Patients were prompted to share a song that empowered them. Pt were then prompted to share/reflect on songs from the group. Patients were provided the opportunity to write them down reflections in handout and share with group.   Name: Ellis Pollack YOB: 1960   MR: [id number]      Level of Participation: active  Quality of Participation: appropriate/pleasant, distractible, and supportive  Interactions with others: appropriate and supportive  Mood/Affect: appropriate and brightens with interaction  Cognition, Pre Treatment: confused  Cognition, Post Treatment: attentive  Progress: Moderate  Plan: continue with services    Pt appeared pleasantly confused at the beginning of session. Pt gave multiple suggestions for song share and contributed to group discussion. Throughout session, pt was writing down lyrics of a variety of songs that he knew.

## 2025-08-29 NOTE — CARE PLAN
The patient's goals for the shift include no falls    The clinical goals for the shift include maintain safety    Over the shift, the patient did make progress toward the following goals. Barriers to progression include continue to get 8 hrs of sleep and to wear yellow safety socks. Recommendations to address these barriers include maintain safety.

## 2025-08-29 NOTE — PROGRESS NOTES
LSW spoke with pt's wife who agrees that if pt returns back home he is going to stop taking his medications and start using marijuana again. Pt's wife feels that pt is addicted to the marijuana and that he uses daily sometimes more than once per day. Pt's wife reports that he will not listen to her and that he feels that he does not need the medications. Pt continues to present confused and with poor insight. Pt is often disorganized and even had disorganized writings that he shard with staff today. Pt's wife is in agreement that placement is best for pt. Pt was denied by PAS. However LSW plans to appeal the PASRR decision once MOCA is completed.     MARGARITA Grace

## 2025-08-29 NOTE — GROUP NOTE
Group Topic: Self-Care/Wellness   Group Date: 8/29/2025  Start Time: 1000  End Time: 1050  Facilitators: DENVER Mcghee   Department: [facility] REHABTH VIRTUAL    Number of Participants: 9   Group Focus: other Tickling You Jenny  Treatment Modality: Other: Recreation Therapy  Interventions utilized were exploration, group exercise, patient education, problem solving, and support  Purpose: coping skills, maladaptive thinking, feelings, irrational fears, communication skills, insight or knowledge, self-worth, self-care, relapse prevention strategies, and trigger / craving management    Name: Ellis Pollack YOB: 1960   MR: [id number]      Facilitator: Recreational Therapist  Level of Participation: minimal  Quality of Participation: passive  Interactions with others: pt joins group with little encouragement.  Mostly passive with participation secondary to confusion level.  Pt is very focused on writing notes on a paper.  Cooperative with behaviors.  Mood/Affect: pt appears to have difficulty following along with group topic secondary to confusion level.  Triggers (if applicable): n/a  Cognition: confused  Progress: Minimal  Comments: pt problem is delusional thought.  Plan: continue with services

## 2025-08-29 NOTE — GROUP NOTE
Group Topic: Goals   Group Date: 8/28/2025  Start Time: 2000  End Time: 2015  Facilitators: Meseret Yang   Department: [facility] Geriatric     Number of Participants: 10   Group Focus: check in  Treatment Modality: Interpersonal Therapy  Interventions utilized were mental fitness  Purpose: coping skills and feelings    Name: Ellis Pollack YOB: 1960   MR: [id number]      Facilitator: Mental Health [facility]  Level of Participation: minimal  Quality of Participation: appropriate/pleasant  Interactions with others: appropriate  Mood/Affect: irritable  Triggers (if applicable): none  Cognition: confused and not focused  Progress: Minimal  Comments: patient atypical psychosis  Plan: continue with services

## 2025-08-29 NOTE — NURSING NOTE
ALLY NOTE     Problem:  Delusions     Behavior:  Patient was calm and quiet this shift. He was observed in the group room watching television and interacting with peers. He refused his night time Risperidone tab 1 mg. He was paranoid that the medication would make him sleepy and that he would not be able to wake up in the morning. Education on medication effectiveness was provided. Patient did not verbalized understanding of education.     Group Participation: Yes    Appetite/Meals: HS snacks provided       Interventions:  Every 15 minute checks for safety    Response:  Safety maintained     Plan:  Continue current plan of care

## 2025-08-29 NOTE — PROGRESS NOTES
" REHAB Therapy Assessment & Treatment    Patient Name: Ellis Pollack  MRN: [id number]  Today's Date: 8/29/2025    Recreation note : pt is alert and oriented x 1-2 with some confusion and poor insight/judgement.  Attends some groups of choice daily and has displayed pleasantly confused behaviors/mood with interactions.  Appears to be distracted easily and often is unable to grasp group topics as he tends to focus on other things during group like writing \"things that are not fair to me\" on paper.  Expresses frustrations  at \"being told what to do\" and often states \"nothing is fair\".  Pt is eating well and reports he is sleeping \"ok\".  Will continue to encourage pt to attend groups of choice daily.  "

## 2025-08-29 NOTE — GROUP NOTE
Group Topic: Goals   Group Date: 8/29/2025  Start Time: 0730  End Time: 0800  Facilitators: Betsy Herndon   Department: [facility] Geriatric     Number of Participants: 7   Group Focus: goals  Treatment Modality: Interpersonal Therapy  Interventions utilized were assignment  Purpose: other: goals    Name: Ellis Pollack YOB: 1960   MR: [id number]      Facilitator: Mental Health PCNA  Level of Participation: minimal  Quality of Participation: appropriate/pleasant and cooperative  Interactions with others: appropriate and gave feedback  Mood/Affect: appropriate  Triggers (if applicable): n/a  Cognition: coherent/clear  Progress: Minimal  Comments: Patient problem is Atypical psychosis.  Plan: continue with services

## 2025-08-30 PROBLEM — F03.90 MAJOR NEUROCOGNITIVE DISORDER (MULTI): Status: ACTIVE | Noted: 2025-08-30

## 2025-08-30 ASSESSMENT — PAIN SCALES - GENERAL
PAINLEVEL_OUTOF10: 0 - NO PAIN
PAINLEVEL_OUTOF10: 4
PAINLEVEL_OUTOF10: 0 - NO PAIN
PAINLEVEL_OUTOF10: 0 - NO PAIN

## 2025-08-30 ASSESSMENT — MONTREAL COGNITIVE ASSESSMENT (MOCA)
WHAT IS THE TOTAL SCORE (OUT OF 30): 16
5. MEMORY TRIALS: 0
8. SERIAL SUBTRACTION OF 7S: 3
10. [FLUENCY] NAME WORDS STARTING WITH DESIGNATED LETTER: 0
VISUOSPATIAL/EXECUTIVE SUBSCORE: 2
6. READ LIST OF DIGITS [FORWARD/BACKWARD]: 2
9. REPEAT EACH SENTENCE: 1
13. ORIENTATION SUBSCORE: 6
7. [VIGILENCE] TAP WHEN HEARING DESIGNATED LETTER: 0
4. NAME EACH OF THE THREE ANIMALS SHOWN: 2
12. MEMORY INDEX SCORE: 0
11. FOR EACH PAIR OF WORDS, WHAT CATEGORY DO THEY BELONG TO (OUT OF 2): 0
WHAT LEVEL OF EDUCATION WAS ATTAINED: 0

## 2025-08-30 ASSESSMENT — PAIN DESCRIPTION - DESCRIPTORS
DESCRIPTORS: ACHING
DESCRIPTORS: ACHING

## 2025-08-30 ASSESSMENT — ACTIVITIES OF DAILY LIVING (ADL)
EFFECT OF PAIN ON DAILY ACTIVITIES: MINIMAL
EFFECT OF PAIN ON DAILY ACTIVITIES: MINIMAL

## 2025-08-30 ASSESSMENT — PAIN - FUNCTIONAL ASSESSMENT
PAIN_FUNCTIONAL_ASSESSMENT: 0-10

## 2025-08-30 NOTE — PROGRESS NOTES
Ellis Pollack is a 64 y.o. male on day 25 of admission presenting with Atypical psychosis.      Subjective     Chart reviewed and the patient’s case was discussed with nursing.    Patient initially found sitting in the group room right flank through papers in his folder reading them allowed to another patient.  On provider approach, patient responded to being greeted immediately and emerged willingly to meet with provider in the adjacent team room.  Patient and provider then sat to complete the [place] cognitive assessment.  Patient completed this with a score of 16.  On arrival, scored 24 on the MMSE however this likely overestimated his true cognitive abilities as the MoCA is more sensitive and specific as a screening tool for neurocognitive findings.  This score is highly suggestive of major neurocognitive disorder however further specification cannot be made inpatient as this would require neuropsychiatric or psychologic testing after discharge.  We will add this neurocognitive finding to his problem list.  His presentation after 25 days remains more similar to that on admission more suggestive of neurocognitive involvement than mood or thought disorder (pseudodementia) as primary reason for findings on MoCA.    Patient continues to demonstrate sialorrhea likely from Risperdal and we have offered intraoral tropicamide; patient reports he will try this.  Engages readily at times however other times found withdrawn and isolated in bed sleeping perpendicular to the bed itself.  Ambulates around the unit with a slow unsteady gait and is often seen using the phone.  When given an opportunity to ask questions or express other concerns, had none to ask.      Chart review yields the patient has been more adherent with medications over the last 24 hours.  Optimal disposition remains primary reason for continued admission at this time.  The prob[place] court in his home community is seeking linkage with this patient however  "has been unable to substantiate that he meets criteria for assisted outpatient treatment lacking records to demonstrate the chronicity over the previous several years and thus these papers have not yet been completed.    Objective   Last Recorded Vitals  Blood pressure 128/82, pulse 77, temperature 36.7 °C (98.1 °F), temperature source Temporal, resp. rate 16, height 1.702 m (5' 7\"), weight 65.2 kg (143 lb 11.8 oz), SpO2 99%.       Sleep Log  Estimated \"Sleeping\" Durations   Night Est. Hours   08/16 10.00-10.50   08/17 8.75-9.50   08/18 9.25   08/19 8.75-9.00   08/20 8.75-9.25   08/21 6.50-6.75   08/22 9.75   08/23 9.25-9.75   08/24 7.00-8.25   08/25 7.75-8.75   08/26 9.00-9.25   08/27 7.75-8.50   08/28 7.25-7.75   08/29 8.50   08/30 0.00        Review of Systems    Psychiatric ROS - Adult  Anxiety:  PANCHO due to disorganized thought processes  Depression: PANCHO due to disorganized thought processes  Delirium: PANCHO due to disorganized thought processes  Psychosis: delusions. When asked he denies experiencing AVH  Elizabeth: APNCHO due to disorganized thought processes  Safety Issues: when asked he denies SI, denies HI  Psychiatric ROS Comment: -    Physical Exam  Vitals and nursing note reviewed.   Pulmonary:      Effort: Pulmonary effort is normal.   Neurological:      Mental Status: He is alert.       Mental Status Exam  General: appears older than stated age, disheveled  Appearance: hospital attire (noted he is wearing hospital pants with a pair of shorts on top of them)  Attitude: calm, confused  Behavior: disorganized but cooperative  Motor Activity: No abnormal movements noted  Speech: reduced rate and volume, poorly enunciated at times   Mood: \"I am doing ok\"  Affect: mildly constricted    Thought Process:  disorganized, loose associations, tangential  Thought Content: delusions, probable paranoia, when asked he denies SI/HI  Thought Perception: when asked he denies AH/VH  Cognition: alert and oriented to self, place, " time, and president  Insight: possible baseline of limited - short term memory deficits, does not recall why he is admitted, extent of his illness  Judgement: possible baseline of poor to fair, patient is intermittently taking medications, nonagitated     Psychiatric Risk Assessment  Violence Risk Assessment: major mental illness, male, substance abuse, and other possible developing dementia  Acute Risk of Harm to Others is Considered: low  Suicide Risk Assessment: age > 65 yrs old, , and male  Protective Factors against Suicide: other denies SI/HI, denies past SA  Acute Risk of Harm to Self is Considered: moderate    Relevant Results     No results found for this or any previous visit (from the past 96 hours).      Assessment & Plan  Atypical psychosis    Seizure disorder (Multi)    Major neurocognitive disorder (Multi)      Ellis Pollack is a 64 year old M with past psychiatric history of anxiety, psychosis, admitted to [facility] on 8/5/25 with psychotic symptoms.      Now admitted for 25 days with improvement in irritability persisting with some thought disorganization, seemingly purposeless wandering behaviors.  Medication adherence fluctuating however improved over the last 24 hours.  Cognitive screening assessment likely indicative of major neurocognitive disorder.  Pending optimal disposition to the community.     Biological  - continue risperdal 1mg tid for psychosis/agitation/anxiety . Tolerating it well  - continue Depakote ER 24 hr, 750mg for mood stabilization (outpatient neurology provider Kathia Chan CNP agreed with switching Keppra to Depakote to minimize potential neuropsychiatric side effects). No reported SE   - LFT today WNL   - VPA level 44  - prns available  - medical co-managing pertinences  - c/w daily thiamine and MVI  -initiate intraoral tropicamide for sialorrhea of risperdal 1% 1 drop bid swish and spit    Swallow study results:  Recommendations:  Risk for Aspiration:  No  Solid Diet Recommendations : Regular (IDDSI Level 7)  Liquid Diet Recommendations: Thin (IDDSI Level 0)  Compensatory Swallowing Strategies: Upright 90 degrees as possible for all oral intake, Small bites/sips, Other (Comment), Alternate solids and liquids (extra gravies/sauces as needed)  Medication Administration Recommendations: Whole, With Liquid    Psychological  - Provider encourages patient to attend all groups.     Sociological  - Provider encourages patient to work with social work regarding safe discharge plan.          Parts of this chart have been completed using voice recognition software.  Please excuse any errors of transcription.  Despite the medical decision making time stamp, my medical decision making has taken place during the patient's entire visit.  Thought process and reason for plan has been formulated from the time that I saw the patient until the time of disposition and is not specific to one specific moment during their visit and furthermore the medical decision making encompasses the entire chart and not only that represented in this note.    Nutrition/Malnutrition:  The diagnosis of malnutrition has significant impact on risk adjustment, mortality and readmission rates, length of stay and hospital reimbursement.  The below is a representation of nutrition status if evaluated.  If blank, there is no associated malnutrition finding to incorporate per the dietician team:           I personally spent 35 minutes today providing care for this patient, including multidisciplinary team discussion, preparation, face to face time, documentation and other services such as review of medical records, diagnostic result, patient education, counseling, coordination of care as specified in the encounter.    Jasvir Dhillon DO

## 2025-08-30 NOTE — NURSING NOTE
ALLY NOTE     Problem:  paranoia     Behavior:  This patient has been calm and cooperative with care.  He is following commands.  No paranoia noted.  Pt filled out goals sheet this morning and his answers were non sensical.  He mumbles at times when interacting which makes it difficult to understand him.  He did take scheduled medications and went to group activities.  Group Participation: yes  Appetite/Meals: good       Interventions:  Give scheduled medications  Monitor paranoia  Set behavioral expectations  Provide positive feedback  Encourage group participation    Response:  Pt has been going to groups and taking scheduled medications.  No paranoia or agitation noted.      Plan:  Give scheduled medications  Monitor paranoia  Set behavioral expectations  Provide positive feedback  Encourage group participation

## 2025-08-30 NOTE — NURSING NOTE
ALLY NOTE     Problem:  Delusions     Behavior:  Patient was quiet, calm and cooperative. He was observed in the hallways and group room interacting with peers. He did approach the nurse's station asking for his night medication. No issues taking his medications this evening.    Group Participation: Yes    Appetite/Meals: HS snacks provided       Interventions:  Administered scheduled medications    Response:  Compliant with care and medications     Plan:  Continue current plan of care

## 2025-08-30 NOTE — GROUP NOTE
Group Topic: Stress Reduction/Relaxation   Group Date: 8/30/2025  Start Time: 1030  End Time: 1130  Facilitators: Apoorva FALL CTRS   Department: [facility]    Number of Participants: 8   Group Focus: mindfulness, relaxation, and self-awareness  Treatment Modality: Dialectical Behavioral Therapy, Skills Training, and Other: Recreation Therapy   Interventions utilized were exploration, group exercise, patient education, and support  Purpose: In this therapeutic group patients engaged in mindfulness and relaxation skills including chair yoga exercise/stretching which is utilized to assist in decreasing stress and improve overall mental health. Pt also participated in deep breathing techniques which aims to promote relaxation, self-awareness and helps to decrease stress and anxiety.     Name: Ellis Pollack YOB: 1960   MR: [id number]      Facilitator: Recreational Therapist  Level of Participation: active  Quality of Participation: cooperative, distractible, and engaged  Interactions with others: appropriate  Mood/Affect: appropriate  Cognition: confused  Progress: Moderate  Comments: engages in some group discussion needing redirection as pt can be confused and off topic. Observed participating in each demonstrated relaxation technique.   Plan: continue with services

## 2025-08-30 NOTE — GROUP NOTE
Group Topic: Goals   Group Date: 8/30/2025  Start Time: 0730  End Time: 0815  Facilitators: Jyoti Foote   Department: [facility]     Number of Participants: 9   Group Focus: check in and goals  Treatment Modality: Other: Morning check in and goal setting  Interventions utilized were assignment  Purpose: feelings and other: daily goal setting    Name: Ellis Pollack YOB: 1960   MR: [id number]      Facilitator: Mental Health [facility]  Level of Participation: minimal  Quality of Participation: appropriate/pleasant and cooperative  Interactions with others: appropriate  Mood/Affect: appropriate  Cognition: nonsensical answers on goal sheet  Progress: Minimal  Comments: Patient problem is psychosis. Patient participated in group but answered questions on worksheet with things that did not pertain to group.  Plan: continue with services

## 2025-08-30 NOTE — GROUP NOTE
Group Topic: Feeling Awareness/Expression   Group Date: 8/29/2025  Start Time: 2010  End Time: 2025  Facilitators: Meseret Yang   Department: [facility]    Number of Participants: 10   Group Focus: check in  Treatment Modality: Interpersonal Therapy  Interventions utilized were mental fitness and reminiscence  Purpose: feelings    Name: Ellis Pollack YOB: 1960   MR: [id number]      Facilitator: Mental Health PCNA  Level of Participation: minimal  Quality of Participation: appropriate/pleasant  Interactions with others: appropriate  Mood/Affect: irritable  Triggers (if applicable): none  Cognition: confused and not focused  Progress: Minimal  Comments: patient presented with atypical psychosis  Plan: continue with services

## 2025-08-30 NOTE — PROGRESS NOTES
[facility]: [facility]  PROGRESS NOTE      Ellis Pollack is a 64 y.o. male that is being seen  today for follow up at [facility].  Subjective   Pt. Is being seen for follow up at [facility].  Patient has been stable.  Vital signs are stable.  Patient is  pleasantly confused.    Patient's lab work reviewed.    Patient's lab work has been stable.      ROS  Negative for fever or chills  Negative for sore throat, ear pain, nasal discharge  Negative for cough, shortness of breath or wheezing  Negative for chest pain, palpitations, swelling of legs  Negative for abdominal pain, constipation, diarrhea, blood in the stools  Negative for urinary complaints  Negative for headache, dizziness, weakness or numbness  Negative for joint pain  Positive for dementia and anxiety  All other systems reviewed and were negative   Vitals:    08/30/25 0743   BP: 128/82   Pulse: 77   Resp: 16   Temp: 36.7 °C (98.1 °F)   SpO2: 99%      Vitals:    08/13/25 1900   Weight: 65.2 kg (143 lb 11.8 oz)     Body mass index is 22.51 kg/m².  Physical Exam  Constitutional: Patient does not appear to be in any acute distress  Cardiovascular: RRR, S1/S2, no murmurs, rubs, or gallops, radial pulses +2, no edema of extremities  Pulmonary: CTAB, no respiratory distress.  Abdomen: +BS, soft, non-tender, nondistended, no guarding or rebound, no masses noted  MSK: No joint swelling, normal movements of all extremities. Range of motion- normal.  Skin- No lesions, contusions, or erythema.  Peripheral puslses palpable bilaterally 2+  Neuro: AAO X1-2, Cranial nerves 2-12 grossly intact,DTR 2+ in all 4 limbs       LABS   [unfilled]  Lab Results   Component Value Date    GLUCOSE 87 08/26/2025    CALCIUM 9.3 08/26/2025     08/26/2025    K 4.4 08/26/2025    CO2 33 (H) 08/26/2025     08/26/2025    BUN 22 08/26/2025    CREATININE 0.80 08/26/2025     Lab Results   Component Value Date    ALT 26 08/26/2025    AST 15 08/26/2025    ALKPHOS  "48 08/26/2025    BILITOT 0.9 08/26/2025     Lab Results   Component Value Date    WBC 9.3 08/05/2025    HGB 14.8 08/05/2025    HCT 43.2 08/05/2025    MCV 94 08/05/2025     08/05/2025     No results found for: \"CHOL\"  No results found for: \"HDL\"  No results found for: \"LDLCALC\"  No results found for: \"TRIG\"  Lab Results   Component Value Date    HGBA1C 5.4 11/22/2021     Other labs not included in the list above were reviewed either before or during this encounter.    History    Medical History[1]  Surgical History[2]  Family History[3]  Allergies[4]  Medications Ordered Prior to Encounter[5]  Immunization History   Administered Date(s) Administered    Moderna SARS-CoV-2 Vaccination 04/10/2021, 05/08/2021, 12/28/2021    Pfizer COVID-19 vaccine, 12 years and older, (30mcg/0.3mL) (Comirnaty) 12/06/2023, 09/29/2024    Pfizer COVID-19 vaccine, bivalent, age 12 years and older (30 mcg/0.3 mL) 12/20/2022    Tdap vaccine, age 7 year and older (BOOSTRIX, ADACEL) 02/22/2024     Patient's medical history was reviewed and updated either before or during this encounter.  ASSESSMENT / PLAN:  Active Hospital Problems    MCI (mild cognitive impairment)      Seizure disorder (Multi)      *Atypical psychosis       Patient is being seen for follow-up at [facility].  Patient has seizure disorder and is on Keppra.  Will continue with the same dose.  Patient's vital signs are stable.  Patient is being seen by [facility] team for atypical psychosis.  Patient is pleasantly confused .       Pierce Murray MD                            [1] History reviewed. No pertinent past medical history.  [2] History reviewed. No pertinent surgical history.  [3] No family history on file.  [4] No Known Allergies  [5]   No current facility-administered medications on file prior to encounter.     Current Outpatient Medications on File Prior to Encounter   Medication Sig Dispense Refill    calcium carbonate 500 mg calcium (1,250 mg) chewable " tablet Chew and swallow 1 tablet (500 mg of elemental calcium) once daily.      levETIRAcetam (Keppra) 500 mg tablet Take 1 tablet (500 mg) by mouth 2 times a day. 60 tablet 11    multivitamin tablet Take 1 tablet by mouth once daily.

## 2025-08-30 NOTE — CARE PLAN
The patient's goals for the shift include no falls    The clinical goals for the shift include maintain safetiy    Over the shift, the patient did make progress toward the following goals.

## 2025-08-30 NOTE — CARE PLAN
The patient's goals for the shift include pt did not state    The clinical goals for the shift include participate in group activities    Over the shift, the patient did make progress toward the following goals.       Problem: Chronic Conditions and Co-morbidities  Goal: Patient's chronic conditions and co-morbidity symptoms are monitored and maintained or improved  Outcome: Progressing     Problem: Nutrition  Goal: Nutrient intake appropriate for maintaining nutritional needs  Outcome: Progressing

## 2025-08-30 NOTE — GROUP NOTE
Group Topic: Other   Group Date: 8/30/2025  Start Time: 1330  End Time: 1415  Facilitators: BONNIE ColeS   Department: [facility]    Number of Participants: 9   Group Focus: concentration, impulsivity, leisure skills, and social skills, following directions  Treatment Modality: Leisure Development and Other: Recreation Therapy   Interventions utilized were group exercise, leisure development, and mental fitness  Purpose: In this recreational therapy session patients engaged in a group game of “[place] Mentality” developing social interaction, increased focus and following directions. This activity aims to provide a positive and enjoyable experience for participants.    Name: Ellis Pollack YOB: 1960   MR: [id number]      Facilitator: Recreational Therapist  Level of Participation: active  Quality of Participation: appropriate/pleasant, cooperative, and engaged  Interactions with others: appropriate  Mood/Affect: appropriate  Cognition: coherent/clear  Progress: Moderate  Comments: Pt follows directions appropriately. Pt demonstrated healthy emotion regulation by handling both winning and losing with a positive attitude.    Plan: continue with services

## 2025-08-31 ASSESSMENT — PAIN - FUNCTIONAL ASSESSMENT: PAIN_FUNCTIONAL_ASSESSMENT: 0-10

## 2025-08-31 ASSESSMENT — PAIN SCALES - GENERAL: PAINLEVEL_OUTOF10: 0 - NO PAIN

## 2025-08-31 NOTE — GROUP NOTE
Group Topic: Goals   Group Date: 8/31/2025  Start Time: 0730  End Time: 0815  Facilitators: Jyoti Foote   Department: [facility]     Number of Participants: 10   Group Focus: check in and goals  Treatment Modality: Other: Morning check in and goal setting  Interventions utilized were assignment  Purpose: other: Check in and daily goal setting    Name: Ellis Pollack YOB: 1960   MR: [id number]      Facilitator: Mental Health PCNA  Level of Participation: active  Quality of Participation: appropriate/pleasant and cooperative  Interactions with others: appropriate  Mood/Affect: appropriate  Cognition: coherent/clear  Progress: Moderate  Comments: Patient problem is psychosis. Patient was active and engaged.  Plan: continue with services

## 2025-08-31 NOTE — GROUP NOTE
Group Topic: Other   Group Date: 8/31/2025  Start Time: 1330  End Time: 1430  Facilitators: BONNIE ColeS   Department: [facility]    Number of Participants: 10   Group Focus: leisure skills, self-esteem, and social skills, concentration  Treatment Modality: Leisure Development and Other: Recreation Therapy  Interventions utilized were group exercise, leisure development, and mental fitness  Purpose: Patients engaged in group game of Catch Phrase. This game can help to improve frustration tolerance, concentration, improving social communication, and building rapport for a positive social interaction while encouraging flexibility in thinking.    Name: Ellis Pollack YOB: 1960   MR: [id number]      Facilitator: Recreational Therapist  Level of Participation: minimal  Quality of Participation: appropriate/pleasant and engaged  Interactions with others: appropriate  Mood/Affect: appropriate  Cognition: loose  Progress: Moderate  Comments: pt joins group for last 5 minutes. Does engage, but needing some redirection secondary to confusion.   Plan: continue with services

## 2025-08-31 NOTE — NURSING NOTE
ALLY NOTE     Problem:  Paranoia     Behavior:  Pt has been calm and cooperative.  He wanders the unit at times and has been out of his room most of the shift and attending group activities.  He is taking scheduled medications.  He has not expressed paranoia this shift.  Group Participation: yes  Appetite/Meals: good       Interventions:  Give scheduled medications  Encourage pt to participate in group activities  Every 15 minute checks    Response:  Pt is calm and cooperative and currently walking the hallways.  No paranoia noted.     Plan:  Give scheduled medications  Encourage pt to participate in group activities  Every 15 minute checks

## 2025-08-31 NOTE — NURSING NOTE
ALLY NOTE     Problem:  Delusions     Behavior:  Patient is in the group room sitting in a chair and eating a snack. Patient is pleasant and calm. Patient’s affect is blunted. Patient is a little talkative. Patient maintains brief eye contact.    Group Participation: N/A  Appetite/Meals: N/A       Interventions:  This nurse completed a shift assessment and administered patient's scheduled night time medications.    Response:  Patient remained pleasant and calm and was cooperative throughout this shift assessment and medication administration.     Plan:  Continue to monitor for delusions. Continue to monitor for patient safety.

## 2025-08-31 NOTE — CARE PLAN
The patient's goals for the shift include pt did not state    The clinical goals for the shift include participate in group activities    Over the shift, the patient did make progress toward the following goals.       Problem: Chronic Conditions and Co-morbidities  Goal: Patient's chronic conditions and co-morbidity symptoms are monitored and maintained or improved  Outcome: Progressing     Problem: Fall/Injury  Goal: Not fall by end of shift  Outcome: Progressing     Problem: Fall/Injury  Goal: Be free from injury by end of the shift  Outcome: Progressing

## 2025-08-31 NOTE — GROUP NOTE
"Group Topic: Art Creative   Group Date: 8/31/2025  Start Time: 1030  End Time: 1150  Facilitators: Apoorva FALL CTRS   Department: [facility]    Number of Participants: 11   Group Focus: mindfulness, relaxation, self-awareness, and self-esteem  Treatment Modality: Other: Recreation Therapy, Therapeutic Creative Art  Interventions utilized were exploration, group exercise, leisure development, and support  Purpose: Patients participated in therapeutic creative arts with choice of multi-media. This activity promoted creative expression, leisure awareness, while also working on fine motor skills and following directions. This group is aimed to explore the use of mindfulness-based approach in a group environment. Patients were engaged in discussion focused on “Emotional Chek-In\" and the importance of acknowledging and validating our emotions. CTRS also played music to create a calm and inviting atmosphere, while promoting memory re-call.      Name: Ellis Pollack YOB: 1960   MR: [id number]      Facilitator: Recreational Therapist  Level of Participation: active  Quality of Participation: appropriate/pleasant, attentive, cooperative, and engaged  Interactions with others: appropriate  Mood/Affect: appropriate  Cognition: coherent/clear  Progress: Moderate  Comments: pt engages with appropriate participation. Displays good attention to tasks.   Plan: continue with services      "

## 2025-08-31 NOTE — PROGRESS NOTES
"Ellis Pollack is a 64 y.o. male on day 26 of admission presenting with Atypical psychosis.      Subjective     Chart reviewed and the patient’s case was discussed with nursing.    Patient continues to present similarly from day to day with intervals of time spent standing at the nursing station, observed laying in bed perpendicular to the bed, seen sitting on his bed using the phone.  He offers limited germane content related to the admission and circumstances requiring inpatient stabilization.  He has been medication adherent for almost 48 hours including the newly added intraoral tropicamide for sialorrhea.  He appears to be with significantly less salivation than previous.  Provider engaged patient to discuss his ongoing treatment in the hospital and benefit of medications.  He astutely asked what percentage of improvement is to be expected from accepting medications.  We used a pizza pie analogy to describe that each slice of the pizza is an aspect of treatment that will be helpful whereby medications are one slice of the pizza.  He then derailed talking about wanting to have pizza with various toppings and the helpfulness of the analogy appears to have been lost as he carried the conversation in a different direction.  At times he was seen with inappropriate spontaneous laughter as though internally preoccupied or stimulated.  He remains somewhat intrusive standing near staff or standing by the nursing station listening to staff members while providing care to other patients.  He has not demonstrated any behavioral disruptions no disturbances recently beyond described herein.  He continues to use the phone more than most other patients presently admitted and at times losing it within his own room.    Objective   Last Recorded Vitals  Blood pressure 123/76, pulse 88, temperature 37 °C (98.6 °F), temperature source Temporal, resp. rate 17, height 1.702 m (5' 7\"), weight 65.2 kg (143 lb 11.8 oz), SpO2 97%.   " "    Sleep Log  Estimated \"Sleeping\" Durations   Night Est. Hours   08/17 8.75-9.50   08/18 9.25   08/19 8.75-9.00   08/20 8.75-9.25   08/21 6.50-6.75   08/22 9.75   08/23 9.25-9.75   08/24 7.00-8.25   08/25 7.75-8.75   08/26 9.00-9.25   08/27 7.75-8.50   08/28 7.25-7.75   08/29 8.50   08/30 9.00   08/31 0.00        Review of Systems    Psychiatric ROS - Adult  Anxiety:  PANCHO due to disorganized thought processes  Depression: PANCHO due to disorganized thought processes  Delirium: PANCHO due to disorganized thought processes  Psychosis: delusions. When asked he denies experiencing AVH  Elizabeth: PANCHO due to disorganized thought processes  Safety Issues: when asked he denies SI, denies HI  Psychiatric ROS Comment: -    Physical Exam  Vitals and nursing note reviewed.   Pulmonary:      Effort: Pulmonary effort is normal.   Neurological:      Mental Status: He is alert.       Mental Status Exam  General: appears older than stated age, disheveled  Appearance: hospital attire (noted he is wearing hospital pants with a pair of shorts on top of them)  Attitude: calm, confused  Behavior: disorganized but cooperative  Motor Activity: No abnormal movements noted  Speech: reduced rate and volume, poorly enunciated at times   Mood: \"fine\"  Affect: constricted    Thought Process:  disorganized, loose associations, tangential  Thought Content: delusions, probable paranoia, when asked he denies SI/HI  Thought Perception: when asked he denies AH/VH  Cognition: alert and oriented to self, place, time, and president  Insight: possible baseline of limited - short term memory deficits, does not recall why he is admitted, extent of his illness  Judgement: possible baseline of poor to fair, patient is intermittently taking medications, nonagitated     Psychiatric Risk Assessment  Violence Risk Assessment: major mental illness, male, substance abuse, and other possible developing dementia  Acute Risk of Harm to Others is Considered: low  Suicide Risk " Assessment: age > 65 yrs old, , and male  Protective Factors against Suicide: other denies SI/HI, denies past SA  Acute Risk of Harm to Self is Considered: moderate    Relevant Results     No results found for this or any previous visit (from the past 96 hours).      Assessment & Plan  Atypical psychosis    Seizure disorder (Multi)    Major neurocognitive disorder (Multi)      Ellis Pollack is a 64 year old M with past psychiatric history of anxiety, psychosis, admitted to [facility] on 8/5/25 with psychotic symptoms.      Now admitted for 25 days with improvement in irritability persisting with some thought disorganization, seemingly purposeless wandering behaviors.  Medication adherence fluctuating however improved over the last 24 hours.  Cognitive screening assessment likely indicative of major neurocognitive disorder.  Pending optimal disposition to the community.     Biological  - continue risperdal 1mg tid for psychosis/agitation/anxiety . Tolerating it well  - continue Depakote ER 24 hr, 750mg for mood stabilization (outpatient neurology provider Kathia Chan CNP agreed with switching Keppra to Depakote to minimize potential neuropsychiatric side effects). No reported SE   - LFT today WNL   - VPA level 44  - prns available  - medical co-managing pertinences  - c/w daily thiamine and MVI  -continue intraoral tropicamide for sialorrhea of risperdal 1% 1 drop bid swish and spit    Swallow study results:  Recommendations:  Risk for Aspiration: No  Solid Diet Recommendations : Regular (IDDSI Level 7)  Liquid Diet Recommendations: Thin (IDDSI Level 0)  Compensatory Swallowing Strategies: Upright 90 degrees as possible for all oral intake, Small bites/sips, Other (Comment), Alternate solids and liquids (extra gravies/sauces as needed)  Medication Administration Recommendations: Whole, With Liquid    Psychological  - Provider encourages patient to attend all groups.     Sociological  - Provider  encourages patient to work with social work regarding safe discharge plan.          Parts of this chart have been completed using voice recognition software.  Please excuse any errors of transcription.  Despite the medical decision making time stamp, my medical decision making has taken place during the patient's entire visit.  Thought process and reason for plan has been formulated from the time that I saw the patient until the time of disposition and is not specific to one specific moment during their visit and furthermore the medical decision making encompasses the entire chart and not only that represented in this note.    Nutrition/Malnutrition:  The diagnosis of malnutrition has significant impact on risk adjustment, mortality and readmission rates, length of stay and hospital reimbursement.  The below is a representation of nutrition status if evaluated.  If blank, there is no associated malnutrition finding to incorporate per the dietician team:           I personally spent 25 minutes today providing care for this patient, including multidisciplinary team discussion, preparation, face to face time, documentation and other services such as review of medical records, diagnostic result, patient education, counseling, coordination of care as specified in the encounter.    Jasvir Dhillon, DO

## 2025-08-31 NOTE — GROUP NOTE
Group Topic: Goals   Group Date: 8/30/2025  Start Time: 2005  End Time: 2016  Facilitators: Karli Edward   Department: [facility]     Number of Participants: 8   Group Focus: goals  Treatment Modality: Other: PCA  Interventions utilized were reminiscence  Purpose: feelings and communication skills    Name: Ellis Pollack YOB: 1960   MR: [id number]      Facilitator: Mental Health [facility]NA  Level of Participation: active  Quality of Participation: attentive and cooperative  Interactions with others: gave feedback  Mood/Affect: manic  Triggers (if applicable): N/A  Cognition: coherent/clear  Progress: Minimal  Comments: Pt problem is atypical psychosis. Pt got off topic quick when asked how is day went and if had achieved anything goals today.   Plan: continue with services

## 2025-09-01 ASSESSMENT — PAIN SCALES - GENERAL
PAINLEVEL_OUTOF10: 0 - NO PAIN
PAINLEVEL_OUTOF10: 0 - NO PAIN

## 2025-09-01 ASSESSMENT — PAIN SCALES - WONG BAKER: WONGBAKER_NUMERICALRESPONSE: NO HURT

## 2025-09-01 NOTE — GROUP NOTE
Group Topic: Excercise/Physical    Group Date: 9/1/2025  Start Time: 1030  End Time: 1130  Facilitators: Apoorva FALL CTRS   Department: [facility]    Number of Participants: 11   Group Focus: communication, self-esteem, and social skills  Treatment Modality: Leisure Development and Other: Recreation Therapy  Interventions utilized were exploration, group exercise, leisure development, reminiscence, and support  Purpose: In this group patients were prompted to engage together in a therapeutic activity which aims to promote increased socialization, motivation, physical activity level, communication and following directions along utilizing balance, ROM, motor skills and increase physical movement. CTRS integrated music to this activity to enhance mood and promote memory stimulation.  Name: Ellis Pollack YOB: 1960   MR: [id number]      Facilitator: Recreational Therapist  Level of Participation: active  Quality of Participation: appropriate/pleasant, cooperative, and engaged  Interactions with others: appropriate  Mood/Affect: appropriate  Cognition: confused  Progress: Moderate  Comments: pt fully engaged, needing redirection often to follow directions secondary to confusion.   Plan: continue with services

## 2025-09-01 NOTE — PROGRESS NOTES
"Ellis Pollack is a 64 y.o. male on day 27 of admission presenting with Atypical psychosis.      Subjective     Chart reviewed and the patient’s case was discussed with nursing.    Patient continues to present similarly from day to day with intervals of time spent standing at the nursing station, observed laying in bed perpendicular to the bed, seen sitting on his bed using the phone.  He offers limited germane content related to the admission and circumstances requiring inpatient stabilization.  He has been medication adherent for almost 72 hours including the newly added intraoral tropicamide for sialorrhea.  He appears to be with significantly less salivation than previous.  Provider engaged patient to discuss his ongoing treatment in the hospital and benefit of medications. At times he was seen with inappropriate spontaneous laughter as though internally preoccupied or stimulated. He remains somewhat intrusive standing near staff or standing by the nursing station listening to staff members while providing care to other patients. He has improved from demonstrated behavioral disruptions on admission.      Objective   Last Recorded Vitals  Blood pressure 109/77, pulse 86, temperature 36.1 °C (97 °F), temperature source Temporal, resp. rate 16, height 1.702 m (5' 7\"), weight 65.2 kg (143 lb 11.8 oz), SpO2 99%.       Sleep Log  Estimated \"Sleeping\" Durations   Night Est. Hours   08/18 9.25   08/19 8.75-9.00   08/20 8.75-9.25   08/21 6.50-6.75   08/22 9.75   08/23 9.25-9.75   08/24 7.00-8.25   08/25 7.75-8.75   08/26 9.00-9.25   08/27 7.75-8.50   08/28 7.25-7.75   08/29 8.50   08/30 9.00   08/31 8.25-8.75   09/01 0.00        Review of Systems    Psychiatric ROS - Adult  Anxiety:  PANCHO due to disorganized thought processes  Depression: PANCHO due to disorganized thought processes  Delirium: PANCHO due to disorganized thought processes  Psychosis: delusions. When asked he denies experiencing AVH  Elizabeth: PANCHO due to " "disorganized thought processes  Safety Issues: when asked he denies SI, denies HI  Psychiatric ROS Comment: -    Physical Exam  Vitals and nursing note reviewed.   Pulmonary:      Effort: Pulmonary effort is normal.   Neurological:      Mental Status: He is alert.       Mental Status Exam  General: appears older than stated age, disheveled  Appearance: hospital attire (noted he is wearing hospital pants with a pair of shorts on top of them)  Attitude: calm, confused  Behavior: disorganized but cooperative  Motor Activity: No abnormal movements noted  Speech: reduced rate and volume, poorly enunciated at times   Mood: \"fine\"  Affect: constricted    Thought Process:  disorganized, loose associations, tangential  Thought Content: delusions, probable paranoia, when asked he denies SI/HI  Thought Perception: when asked he denies AH/VH  Cognition: alert and oriented to self, place, time, and president  Insight: possible baseline of limited - short term memory deficits, does not recall why he is admitted, extent of his illness  Judgement: possible baseline of poor to fair, patient is intermittently taking medications, nonagitated     Psychiatric Risk Assessment  Violence Risk Assessment: major mental illness, male, substance abuse, and other possible developing dementia  Acute Risk of Harm to Others is Considered: low  Suicide Risk Assessment: age > 65 yrs old, , and male  Protective Factors against Suicide: other denies SI/HI, denies past SA  Acute Risk of Harm to Self is Considered: moderate    Relevant Results     No results found for this or any previous visit (from the past 96 hours).      Assessment & Plan  Atypical psychosis    Seizure disorder (Multi)    Major neurocognitive disorder (Multi)      Ellis Pollack is a 64 year old M with past psychiatric history of anxiety, psychosis, admitted to [facility] on 8/5/25 with psychotic symptoms.      Now admitted for 27 days with improvement in irritability " persisting with some thought disorganization, seemingly purposeless wandering behaviors.  Medication adherence fluctuating however improved over the last 72 hours.  Cognitive screening assessment likely indicative of major neurocognitive disorder.  Pending optimal disposition to the community.     Biological  - continue risperdal 1mg tid for psychosis/agitation/anxiety . Tolerating it well  - continue Depakote ER 24 hr, 750mg for mood stabilization (outpatient neurology provider Kathia Chan CNP agreed with switching Keppra to Depakote to minimize potential neuropsychiatric side effects). No reported SE   - LFT today WNL   - VPA level 44  - prns available  - medical co-managing pertinences  - c/w daily thiamine and MVI  -continue intraoral tropicamide for sialorrhea of risperdal 1% 1 drop bid swish and spit    Swallow study results:  Recommendations:  Risk for Aspiration: No  Solid Diet Recommendations : Regular (IDDSI Level 7)  Liquid Diet Recommendations: Thin (IDDSI Level 0)  Compensatory Swallowing Strategies: Upright 90 degrees as possible for all oral intake, Small bites/sips, Other (Comment), Alternate solids and liquids (extra gravies/sauces as needed)  Medication Administration Recommendations: Whole, With Liquid    Psychological  - Provider encourages patient to attend all groups.     Sociological  - Provider encourages patient to work with social work regarding safe discharge plan.          Parts of this chart have been completed using voice recognition software.  Please excuse any errors of transcription.  Despite the medical decision making time stamp, my medical decision making has taken place during the patient's entire visit.  Thought process and reason for plan has been formulated from the time that I saw the patient until the time of disposition and is not specific to one specific moment during their visit and furthermore the medical decision making encompasses the entire chart and not only that  represented in this note.    Nutrition/Malnutrition:  The diagnosis of malnutrition has significant impact on risk adjustment, mortality and readmission rates, length of stay and hospital reimbursement.  The below is a representation of nutrition status if evaluated.  If blank, there is no associated malnutrition finding to incorporate per the dietician team:           I personally spent 15 minutes today providing care for this patient, including multidisciplinary team discussion, preparation, face to face time, documentation and other services such as review of medical records, diagnostic result, patient education, counseling, coordination of care as specified in the encounter.    Jasvir Dhillon, DO

## 2025-09-01 NOTE — NURSING NOTE
ALLY NOTE     Problem:  Paranoia     Behavior:  Patient is in the group room sitting in a chair and eating a snack. Patient is irritable but calm. Patient’s affect is blunted. Patient is talkative. Patient maintains brief eye contact. Patient is irritable because patient asked for the cordless at approximately 1957 and was reminded that the patient has only a few minutes to use the cordless phone for before the has to be returned at 2000.    Group Participation: N/A  Appetite/Meals: N/A       Interventions:  This nurse completed a shift assessment and administered patient's scheduled night time medications.    Response:  Patient remained irritable and calm and was cooperative throughout this shift assessment and medication administration.     Plan:  Continue to monitor for paranoia. Continue to monitor for patient safety.

## 2025-09-01 NOTE — GROUP NOTE
Group Topic: Team Building   Group Date: 9/1/2025  Start Time: 1330  End Time: 1430  Facilitators: BONNIE ColeS   Department: [facility]    Number of Participants: 13   Group Focus: communication, impulsivity, leisure skills, self-esteem, and social skills  Treatment Modality: Leisure Development and Other: Recreation Therapy   Interventions utilized were group exercise, leisure development, and mental fitness  Purpose: Patients engaged in group “Guess It” game. In this therapeutic group patients play a game which is developed to focus on listening skills, workings together as a team, following directions, promote self-esteem and increase appropriate social interactions  Name: Ellis Pollack YOB: 1960   MR: [id number]      Facilitator: Recreational Therapist  Level of Participation: passive  Quality of Participation: passive  Interactions with others: passive  Mood/Affect: passive  Cognition: fell asleep   Progress: Minimal  Comments: pt joins group half way. Declines to participate,. Sits in back of group room away from peers and falls asleep.   Plan: continue with services

## 2025-09-01 NOTE — GROUP NOTE
Group Topic: Goals   Group Date: 8/31/2025  Start Time: 1955  End Time: 2012  Facilitators: Karli Edward   Department: [facility]    Number of Participants: 9   Group Focus: goals  Treatment Modality: Other: PCA  Interventions utilized were reminiscence  Purpose: feelings    Name: Ellis Pollack YOB: 1960   MR: [id number]      Facilitator: Mental Health PCNA  Level of Participation: did not attend  Quality of Participation: did not attend  Interactions with others: did not attend  Mood/Affect: did not attend  Triggers (if applicable): N/A  Cognition: did not attend  Progress: Other  Comments: Pt problem is atypical psychosis. Pt declined the invitation to attend group. Pt was a little agitated tonight.   Plan: continue with services

## 2025-09-01 NOTE — CARE PLAN
The patient's goals for the shift include No falls    The clinical goals for the shift include maintain safety    Over the shift, the patient did make progress toward the following goals. Barriers to progression include wear shoes or yellow safety socks. Recommendations to address these barriers include maintain safety.

## 2025-09-01 NOTE — GROUP NOTE
Group Topic: Goals   Group Date: 9/1/2025  Start Time: 0730  End Time: 0800  Facilitators: [facility]   Department: [facility]     Number of Participants: 16   Group Focus: check in and goals  Treatment Modality: Individual Therapy  Interventions utilized were assignment  Purpose: feelings and self-care    Name: Ellis Pollack YOB: 1960   MR: [id number]      Facilitator: Mental Health PCNA  Level of Participation: moderate  Quality of Participation: appropriate/pleasant, attentive, and engaged  Interactions with others: appropriate  Mood/Affect: appropriate  Cognition: coherent/clear and goal directed  Progress: Moderate  Comments: patient was able to make progress towards treatment of atypical psychosis  Plan: continue with services

## 2025-09-02 ASSESSMENT — PAIN SCALES - GENERAL
PAINLEVEL_OUTOF10: 0 - NO PAIN
PAINLEVEL_OUTOF10: 0 - NO PAIN

## 2025-09-02 ASSESSMENT — PAIN - FUNCTIONAL ASSESSMENT: PAIN_FUNCTIONAL_ASSESSMENT: 0-10

## 2025-09-02 ASSESSMENT — PAIN SCALES - WONG BAKER: WONGBAKER_NUMERICALRESPONSE: NO HURT

## 2025-09-02 NOTE — PROGRESS NOTES
ALEXW was approached by pt who was confused as to when music therapy would take place. Pt states he was told music would be today but then people are telling him its tomorrow. LSW did reinforce that music therapy would be tomorrow and possibly things got mixed up due to the holiday. Pt presented frustrated and confused. Pt has difficulty with time but did have a calendar in his hand and had the days crossed off. ALEXW encouraged pt to attend the group that was currently taking placed and walked pt down to the group room to attend.     MARGARITA Grace

## 2025-09-02 NOTE — CARE PLAN
The patient's goals for the shift include No falls    The clinical goals for the shift include maintain safety    Over the shift, the patient did not make progress toward the following goals. Barriers to progression include no noted barriers at this time. Recommendations to address these barriers include continue current treatment plan.

## 2025-09-02 NOTE — PROGRESS NOTES
LSW submitted documentation for review to [facility] in regards to request for reconsideration. A MOCA was performed over the weekend and pt scored a 16/30, which indications major neurocognitive impairment. There is great concern for pt returning to the community and therefore placement in a facility is recommended for 24/7 supervision. LSW will wait for further response to request.     MARGARITA GraceW

## 2025-09-02 NOTE — PROGRESS NOTES
"Ellis Pollack is a 64 y.o. male on day 28 of admission presenting with Atypical psychosis.      Subjective   Patient’s chart was reviewed and case was discussed with the interdisciplinary team.     Patient observed attending groups today and was seen in his room for follow-up. He reported that his mood is \"good\" and stated that he slept well last night. Patient reported eating well for breakfast and expressed plans to eat lunch.  He denied current suicidal or homicidal ideation, as well as auditory or visual hallucinations. Patient stated he attended the mindfulness group and is looking forward to participating in the music therapy group later today. Regarding his medication, patient reported that it is “ok” and denied experiencing side effects. Chart review indicated that patient refused Risperdal last night. When asked about the reason for refusal, patient did not provide a response. Psychoeducation was provided regarding the importance of medication compliance. Patient did not verbalize understanding, and instead shifted the conversation to unrelated topics, such as problems in general and vitamins. He required redirections back to the topic during the encounter. At the end of the session, patient stated that he wanted to return to group.       Objective     Last Recorded Vitals  Blood pressure 112/73, pulse 73, temperature 36.6 °C (97.9 °F), temperature source Temporal, resp. rate 17, height 1.702 m (5' 7\"), weight 65.2 kg (143 lb 11.8 oz), SpO2 97%.         Sleep Log  Estimated \"Sleeping\" Durations   Night Est. Hours   08/19 8.75-9.00   08/20 8.75-9.25   08/21 6.50-6.75   08/22 9.75   08/23 9.25-9.75   08/24 7.00-8.25   08/25 7.75-8.75   08/26 9.00-9.25   08/27 7.75-8.50   08/28 7.25-7.75   08/29 8.50   08/30 9.00   08/31 8.25-8.75   09/01 9.50-10.00   09/02 0.00        Review of Systems    Psychiatric ROS - Adult  Anxiety:  PANCHO due to disorganized thought processes  Depression: PANCHO due to disorganized thought " "processes  Delirium: PANCHO due to disorganized thought processes  Psychosis: delusions. When asked he denies experiencing AVH  Elizabeth: PANCHO due to disorganized thought processes  Safety Issues: when asked he denies SI, denies HI  Psychiatric ROS Comment: - as noted     Physical Exam  Vitals and nursing note reviewed.   Pulmonary:      Effort: Pulmonary effort is normal.   Neurological:      Mental Status: He is alert.        Mental Status Exam  General: appears older than stated age, disheveled  Appearance: wearing hospital attire   Attitude: calm, confused  Behavior: disorganized but cooperative  Motor Activity: No abnormal movements noted  Speech: reduced rate and volume, poorly enunciated at times   Mood: \"good\"  Affect: constricted    Thought Process:  disorganized, loose associations, tangential  Thought Content: delusions, probable paranoia, when asked he denies SI/HI  Thought Perception: when asked he denies AH/VH  Cognition: alert and oriented to self, place, time, and president  Insight: possible baseline of limited - short term memory deficits, does not recall why he is admitted, extent of his illness  Judgement: possible baseline of poor to fair, patient is intermittently taking medications, nonagitated     Psychiatric Risk Assessment  Violence Risk Assessment: major mental illness, male, substance abuse, and other possible developing dementia  Acute Risk of Harm to Others is Considered: low  Suicide Risk Assessment: age > 65 yrs old, , and male  Protective Factors against Suicide: other denies SI/HI, denies past SA  Acute Risk of Harm to Self is Considered: moderate      Relevant Results  No results found for this or any previous visit (from the past 96 hours).    Electrocardiogram, 12-lead  Result Date: 8/9/2025  Sinus rhythm Atrial premature complex Baseline wander in lead(s) V1 See ED provider note for full interpretation and clinical correlation Confirmed by Fatmata Willson ([id number]) on 8/9/2025 " 11:21:13 AM    Scheduled medications  Scheduled Medications[1]  Continuous medications  Continuous Medications[2]  PRN medications  PRN Medications[3]       Assessment & Plan  Atypical psychosis    Seizure disorder (Multi)    Major neurocognitive disorder (Multi)    Ellis Pollack is a 64 year old M with past psychiatric history of anxiety, psychosis, admitted to [facility] on 8/5/25 with psychotic symptoms.       Now admitted for 28 days with improvement in irritability persisting with some thought disorganization, seemingly purposeless wandering behaviors.  Medication adherence fluctuating however improved over the last 72 hours (refused Risperdal last night).  Cognitive screening assessment likely indicative of major neurocognitive disorder.  Pending optimal disposition to the community.     Biological  - continue risperdal 1mg tid for psychosis/agitation/anxiety . Tolerating it well  - continue Depakote ER 24 hr, 750mg for mood stabilization (outpatient neurology provider Kathia Chan CNP agreed with switching Keppra to Depakote to minimize potential neuropsychiatric side effects). No reported SE              - LFT WNL (08/26/25)              - VPA level 44 (08/26/25)  - prns available  - medical co-managing pertinences  - c/w daily thiamine and MVI  -continue intraoral tropicamide for sialorrhea of risperdal 1% 1 drop bid swish and spit     Swallow study results:  Recommendations:  Risk for Aspiration: No  Solid Diet Recommendations : Regular (IDDSI Level 7)  Liquid Diet Recommendations: Thin (IDDSI Level 0)  Compensatory Swallowing Strategies: Upright 90 degrees as possible for all oral intake, Small bites/sips, Other (Comment), Alternate solids and liquids (extra gravies/sauces as needed)  Medication Administration Recommendations: Whole, With Liquid     Psychological  - Provider encourages patient to attend all groups.     Sociological  - Provider encourages patient to work with social work regarding  safe discharge plan.          Nutrition/Malnutrition:  The diagnosis of malnutrition has significant impact on risk adjustment, mortality and readmission rates, length of stay and hospital reimbursement.  The below is a representation of nutrition status if evaluated.  If blank, there is no associated malnutrition finding to incorporate per the dietician team:                I spent 30 minutes in the professional and overall care of this patient.      Enrique Noble, APRN-CNP           [1] calcium carbonate, 1,250 mg of calcium carbonate, oral, Daily  divalproex, 750 mg, oral, Daily  influenza, 0.5 mL, intramuscular, Once  multivitamin with minerals, 1 tablet, oral, Daily  risperiDONE, 1 mg, oral, TID  thiamine, 100 mg, oral, Daily  tropicamide, 1 drop, oral, BID  [2]    [3] PRN medications: acetaminophen, alum-mag hydroxide-simeth, hydrOXYzine HCL, magnesium hydroxide, nicotine polacrilex, OLANZapine **OR** OLANZapine, traZODone

## 2025-09-02 NOTE — GROUP NOTE
Group Topic: Reminiscence   Group Date: 9/1/2025  Start Time: 2000  End Time: 2010  Facilitators: Karli Edward   Department: [facility]    Number of Participants: 9   Group Focus: reminiscence  Treatment Modality: Other: PCA  Interventions utilized were reminiscence  Purpose: feelings and communication skills    Name: Ellis Pollack YOB: 1960   MR: [id number]      Facilitator: [facility]  Level of Participation: did not attend  Quality of Participation: did not attend  Interactions with others: did not attend  Mood/Affect: did not attend  Triggers (if applicable): N/A  Cognition: did not attend  Progress: Other  Comments: Pt problem is atypical psychosis. Pt declined the invitation to attend group.   Plan: continue with services

## 2025-09-02 NOTE — PROGRESS NOTES
"[facility]: [facility]  PROGRESS NOTE      Ellis Pollack is a 64 y.o. male that is being seen  today for follow up at [facility].  Subjective   Pt. Is being seen for follow up at [facility].  Patient has been stable.  Vital signs are stable.  Patient is a pleasantly confused.  Not able to provide adequate review of system.      ROS  Patient is pleasantly confused and not able to provide adequate review of system.  Vitals:    09/02/25 0700   BP: 112/73   Pulse: 73   Resp: 17   Temp: 36.6 °C (97.9 °F)   SpO2: 97%      Vitals:    08/13/25 1900   Weight: 65.2 kg (143 lb 11.8 oz)     Body mass index is 22.51 kg/m².  Physical Exam  Constitutional: Patient does not appear to be in any acute distress  Cardiovascular: RRR, S1/S2, no murmurs, rubs, or gallops, radial pulses +2, no edema of extremities  Pulmonary: CTAB, no respiratory distress.  Abdomen: +BS, soft, non-tender, nondistended, no guarding or rebound, no masses noted  MSK: No joint swelling, normal movements of all extremities. Range of motion- normal.  Skin- No lesions, contusions, or erythema.  Peripheral puslses palpable bilaterally 2+  Neuro: AAO X1-2, Cranial nerves 2-12 grossly intact,DTR 2+ in all 4 limbs       LABS   [unfilled]  Lab Results   Component Value Date    GLUCOSE 87 08/26/2025    CALCIUM 9.3 08/26/2025     08/26/2025    K 4.4 08/26/2025    CO2 33 (H) 08/26/2025     08/26/2025    BUN 22 08/26/2025    CREATININE 0.80 08/26/2025     Lab Results   Component Value Date    ALT 26 08/26/2025    AST 15 08/26/2025    ALKPHOS 48 08/26/2025    BILITOT 0.9 08/26/2025     Lab Results   Component Value Date    WBC 9.3 08/05/2025    HGB 14.8 08/05/2025    HCT 43.2 08/05/2025    MCV 94 08/05/2025     08/05/2025     No results found for: \"CHOL\"  No results found for: \"HDL\"  No results found for: \"LDLCALC\"  No results found for: \"TRIG\"  Lab Results   Component Value Date    HGBA1C 5.4 11/22/2021     Other labs not " included in the list above were reviewed either before or during this encounter.    History    Medical History[1]  Surgical History[2]  Family History[3]  Allergies[4]  Medications Ordered Prior to Encounter[5]  Immunization History   Administered Date(s) Administered    Moderna SARS-CoV-2 Vaccination 04/10/2021, 05/08/2021, 12/28/2021    Pfizer COVID-19 vaccine, 12 years and older, (30mcg/0.3mL) (Comirnaty) 12/06/2023, 09/29/2024    Pfizer COVID-19 vaccine, bivalent, age 12 years and older (30 mcg/0.3 mL) 12/20/2022    Tdap vaccine, age 7 year and older (BOOSTRIX, ADACEL) 02/22/2024     Patient's medical history was reviewed and updated either before or during this encounter.  ASSESSMENT / PLAN:  Active Hospital Problems    Major neurocognitive disorder (Multi)      Seizure disorder (Multi)      *Atypical psychosis       Patient is being seen for follow-up at [facility].  Patient has seizure disorder and is on Keppra.  Patient's vital signs are stable.  Patient is being seen by [facility] team for atypical psychosis.  Patient is pleasantly confused not able to provide adequate review of systems.      Pierce Murray MD            [1] History reviewed. No pertinent past medical history.  [2] History reviewed. No pertinent surgical history.  [3] No family history on file.  [4] No Known Allergies  [5]   No current facility-administered medications on file prior to encounter.     Current Outpatient Medications on File Prior to Encounter   Medication Sig Dispense Refill    calcium carbonate 500 mg calcium (1,250 mg) chewable tablet Chew and swallow 1 tablet (500 mg of elemental calcium) once daily.      levETIRAcetam (Keppra) 500 mg tablet Take 1 tablet (500 mg) by mouth 2 times a day. 60 tablet 11    multivitamin tablet Take 1 tablet by mouth once daily.

## 2025-09-02 NOTE — NURSING NOTE
JOSIEP NOTE     Problem:  Psychosis     Behavior:  Patient displayed paranoia with medication administration    Group Participation:   100  50    Appetite/Meals:   100 x 3       Interventions:  Ensuring patient is safe, provide therapeutic environment, administer medication as scheduled and prn    Response:  Patient declined evening medication despite multiple attempts to medicate patient     Plan:  Continue current treatment plan

## 2025-09-02 NOTE — GROUP NOTE
Group Topic: Goals   Group Date: 9/2/2025  Start Time: 0730  End Time: 0800  Facilitators: Betsy Herndon   Department: [facility]     Number of Participants: 8   Group Focus: goals  Treatment Modality: Interpersonal Therapy  Interventions utilized were assignment  Purpose: coping skills    Name: Ellis Pollack YOB: 1960   MR: [id number]      Facilitator: [facility]  Level of Participation: active  Quality of Participation: appropriate/pleasant and cooperative  Interactions with others: appropriate and gave feedback  Mood/Affect: appropriate and positive  Triggers (if applicable): n/a  Cognition: coherent/clear  Progress: Minimal  Comments: Patient problem is Atypical psychosis  Plan: continue with services

## 2025-09-02 NOTE — GROUP NOTE
Group Topic: Other   Group Date: 9/2/2025  Start Time: 1330  End Time: 1430  Facilitators: DENVER Mcghee   Department: [facility]    Number of Participants: 12   Group Focus: other Arnie Joggers  Treatment Modality: Other: Recreation Therapy  Interventions utilized were mental fitness  Purpose: other: fun, elevate mood, increase socialization, enhance self esteem    Name: Ellis Pollack YOB: 1960   MR: [id number]      Facilitator: Recreational Therapist  Level of Participation: moderate  Quality of Participation: distractible  Interactions with others: appropriate and gave feedback  Mood/Affect: appropriate and irritable  Triggers (if applicable): n/a  Cognition: confused  Progress: Minimal  Comments: pt problem is delusional thought.  Pt is confused and not very focused on group riddles at the start of session.  After a bit of time, does appear to focus for a short period of time as he responds appropriate to a few questions.    Plan: continue with services

## 2025-09-02 NOTE — GROUP NOTE
Group Topic: Medication Education   Group Date: 9/2/2025  Start Time: 1100  End Time: 1150  Facilitators: Ena Stockton PharmD   Department: [facility] [employer]    Number of Participants: 8   Group Focus: daily focus and other Neducation education  Treatment Modality: Patient-Centered Therapy  Interventions utilized were group exercise, other Bingo Game, and patient education  Purpose: insight or knowledge and other: improved medication compliance    Name: Ellis Pollack YOB: 1960   MR: [id number]      Facilitator: Pharmacist  Level of Participation: minimal  Quality of Participation: cooperative, distractible, quiet, side talking, and withdrawn  Interactions with others: appropriate  Mood/Affect: appropriate  Triggers (if applicable): n/a  Cognition: loose and processing slowly  Progress: Gaining insight or knowledge  Comments: Ellis Pollack attended the general medication education group today. We played Savision.  Ellis Pollack participated in the game by covering the topics discussed on the Bingo board.  Ellis Pollack was quiet but engaged in group.     Plan: continue with services

## 2025-09-02 NOTE — GROUP NOTE
"Group Topic: Dialectical Behavioral Therapy - Mindfulness   Group Date: 9/2/2025  Start Time: 1100  End Time: 1150  Facilitators: DENVER Mcghee   Department: [facility]    Number of Participants: 12   Group Focus: other What is Mindfulness?  Treatment Modality: Other: Recreation Therapy  Interventions utilized were exploration, group exercise, patient education, problem solving, and support  Purpose: coping skills, maladaptive thinking, feelings, irrational fears, communication skills, insight or knowledge, self-worth, self-care, relapse prevention strategies, and trigger / craving management    Name: Ellis Pollack YOB: 1960   MR: [id number]      Facilitator: Recreational Therapist  Level of Participation: minimal  Quality of Participation: distractible  Interactions with others: pt joins group with some encouragement.  Confused and not focused on group topic.  Expresses frustration at \"wrong information given to me\".  Does not appear to be able to grasp topic secondary to confusion level.    Mood/Affect: irritable  Triggers (if applicable): n/a  Cognition: no insight and not focused  Progress: Minimal  Comments: pt problem is delusional thought.    Plan: continue with services      "

## 2025-09-03 ASSESSMENT — PAIN - FUNCTIONAL ASSESSMENT
PAIN_FUNCTIONAL_ASSESSMENT: 0-10
PAIN_FUNCTIONAL_ASSESSMENT: 0-10

## 2025-09-03 ASSESSMENT — PAIN SCALES - WONG BAKER: WONGBAKER_NUMERICALRESPONSE: NO HURT

## 2025-09-03 ASSESSMENT — PAIN SCALES - GENERAL
PAINLEVEL_OUTOF10: 0 - NO PAIN
PAINLEVEL_OUTOF10: 0 - NO PAIN

## 2025-09-03 NOTE — GROUP NOTE
Group Topic: Cognitive Focus   Group Date: 9/3/2025  Start Time: 1000  End Time: 1050  Facilitators: DENVER Mcghee   Department: [facility]    Number of Participants: 12   Group Focus: other Cognitive Distortions  Treatment Modality: Other: Recreation Therapy  Interventions utilized were clarification, exploration, group exercise, patient education, problem solving, and support  Purpose: coping skills, maladaptive thinking, feelings, irrational fears, communication skills, insight or knowledge, self-worth, self-care, relapse prevention strategies, and trigger / craving management    Name: Ellis Pollack YOB: 1960   MR: [id number]      Facilitator: Recreational Therapist  Level of Participation: moderate  Quality of Participation: appropriate/pleasant and cooperative  Interactions with others: gave feedback  Mood/Affect: appropriate and pleasantly confused  Triggers (if applicable): n/a  Cognition: confused  Progress: Minimal  Comments: pt problem is delusional thought.  Pt joins group with little encouragement.  Pleasantly confused and asking questions not related to group topic.  Plan: continue with services

## 2025-09-03 NOTE — CARE PLAN
The patient's goals for the shift include go to groups    The clinical goals for the shift include maintain safety    Over the shift, the patient did make progress toward the following goals. Barriers to progression include understanding. Recommendations to address these barriers include education.    Problem: Safety - Adult  Goal: Free from fall injury  Outcome: Progressing     Problem: Chronic Conditions and Co-morbidities  Goal: Patient's chronic conditions and co-morbidity symptoms are monitored and maintained or improved  Outcome: Progressing

## 2025-09-03 NOTE — GROUP NOTE
Group Topic: Other   Group Date: 9/3/2025  Start Time: 1340  End Time: 1430  Facilitators: DENVER Mcghee   Department: [facility]    Number of Participants: 10   Group Focus: other Can You Name 5?  Treatment Modality: Other: Recreation Therapy  Interventions utilized were mental fitness  Purpose: other: fun, elevate mood, increase socialization, enhance self esteem, stimulate memory    Name: Ellis Pollack YOB: 1960   MR: [id number]      Facilitator: Recreational Therapist  Level of Participation: minimal  Quality of Participation: distractible, passive, and quiet  Interactions with others: pt joins group FDC through group game.  Appearing to have difficulty focusing in group game questions.  Is writing on paper that he has in his folder.  Mostly passive  Mood/Affect: pt appearing to have difficulty focusing   Triggers (if applicable): n/a  Cognition: not focused  Progress: Minimal  Comments: pt problem is delusional thought.   Plan: continue with services

## 2025-09-03 NOTE — GROUP NOTE
Group Topic: Music Therapy   Group Date: 9/3/2025  Start Time: 1100  End Time: 1155  Facilitators: Vesna Hennessy   Department: [facility]    Number of Participants: 12   Group Focus: concentration, expressive outlet, feeling awareness/expression, and impulsivity  Treatment Modality: Music Therapy  Interventions Utilized were: active music engagement, exploration, and passive music engagement    Patients participated in a drum [place], exploring various percussive instruments and exercising impulsivity/ maintaining control as well as working together on a team. Second intervention was creating a thunderstorm with instruments- pts were encouraged to release any stressors, negative emotions, etc. Through their instrument.       Name: Ellis Pollack YOB: 1960   MR: [id number]      Level of Participation: active  Quality of Participation: appropriate/pleasant, drowsy, and engaged  Interactions with others: appropriate  Mood/Affect: appropriate, brightens with interaction, and flat  Cognition, Pre Treatment: attentive  Cognition, Post Treatment: attentive  Progress: Moderate  Plan: continue with services    Pt continues to respond well to music- understood all prompts and agreed when several peers pointed out how happy and peaceful he seems when playing instruments.

## 2025-09-03 NOTE — PROGRESS NOTES
LSW was approached by pt who walked into [facility]'s office and handed the phone over to [facility] to speak with the pt advocate that is on the phone. The pt advocated inquired as to why pt remains in the hospital. W provided pt advocate with the concerns regarding pt's cognitive impairment and returning back home.     MARGARITA Grace

## 2025-09-03 NOTE — NURSING NOTE
BIRP NOTE     Problem:  Unspecified psychosis     Behavior:  No noted paranoid or delusional behaviors this shift, patient observed in group room interacting appropriately with other patients.     Group Participation:   Full    Appetite/Meals:   100 x 3     Interventions:  Administered scheduled medication, and evening snack, as well as, provided safe therapeutic environment    Response:  Patient consumed medication and evening snack without difficulty, pleasant and compliant with care     Plan:  Continue current treatment plan

## 2025-09-03 NOTE — PROGRESS NOTES
"Ellis Pollack is a 64 y.o. male on day 29 of admission presenting with Atypical psychosis.      Subjective   Patient’s chart was reviewed and case was discussed with the interdisciplinary team.     No overnight concerns from staff. Nursing does note patient drooling. He is attending groups, participates to extent. Interactions with staff and peers are appropriate, pt makes his needs known. I talked to him in the hallway. He stated his mood is \"fine\", did not exhibit any delusions or paranoid comments during interaction. I did observe drooling, pt stated it started a few days ago. He denies dysarthria, dysphagia, tremor, gait instability. When asked he denies SI, HI, AVH.      Objective   Last Recorded Vitals  Blood pressure 121/78, pulse 83, temperature 36.7 °C (98.1 °F), temperature source Temporal, resp. rate 17, height 1.702 m (5' 7\"), weight 65.2 kg (143 lb 11.8 oz), SpO2 98%.       Sleep Log  Estimated \"Sleeping\" Durations   Night Est. Hours   08/19 8.75-9.00   08/20 8.75-9.25   08/21 6.50-6.75   08/22 9.75   08/23 9.25-9.75   08/24 7.00-8.25   08/25 7.75-8.75   08/26 9.00-9.25   08/27 7.75-8.50   08/28 7.25-7.75   08/29 8.50   08/30 9.00   08/31 8.25-8.75   09/01 9.50-10.00   09/02 6.00-6.75        Review of Systems    Psychiatric ROS - Adult  Anxiety:  When asked, he denies  Depression: When asked, he denies  Delirium: Not observed  Psychosis: did not observe any delusions during interactions today. Denies AVH.  Elizabeth: Negative  Safety Issues: when asked he denies SI, denies HI  Psychiatric ROS Comment: - as noted     Physical Exam  Vitals and nursing note reviewed.   Constitutional:       Appearance: Normal appearance.   HENT:      Mouth/Throat:      Mouth: Mucous membranes are moist.      Comments: Drooling observed  Pulmonary:      Effort: Pulmonary effort is normal.   Musculoskeletal:      Comments: No tremor, no cogwheeling or rigidity   Neurological:      Mental Status: He is alert.      Comments: " "Decreased blink rate  Slight masked facies  Gait is slow, but steady. Is not shuffling, turns appropriately, however some ?mild festination          Mental Status Exam  General: appears older than stated age, disheveled  Appearance: wearing hospital attire   Attitude: calm, confused  Behavior: cooperative  Motor Activity: No abnormal movements noted  Speech: reduced rate and volume, poorly enunciated at times   Mood: \"fine\"  Affect: masked facies   Thought Process:  more organized in comparison to prior interactions  Thought Content:  when asked he denies SI/HI  Thought Perception: when asked he denies AH/VH  Cognition: alert and oriented to self, place, time, and president  Insight: possible baseline of limited - short term memory deficits, continues to not recall why he is admitted, extent of his illness  Judgement: possible baseline of poor to fair, patient is intermittently taking medications. Cooperation has improved. Participates w/milieu     Psychiatric Risk Assessment  Violence Risk Assessment: major mental illness, male, substance abuse, and other possible developing dementia  Acute Risk of Harm to Others is Considered: low  Suicide Risk Assessment: age > 65 yrs old, , and male  Protective Factors against Suicide: other denies SI/HI, denies past SA  Acute Risk of Harm to Self is Considered: lwo      Relevant Results  VPA 8/26 44        Assessment & Plan  Atypical psychosis    Seizure disorder (Multi)    Major neurocognitive disorder (Multi)    Ellis Pollack is a 64 year old M with past psychiatric history of anxiety, psychosis, admitted to [facility] on 8/5/25 with psychotic symptoms.       Improvement in irritability and delusions, persisting with some thought disorganization, seemingly purposeless wandering behaviors.  Medication adherence fluctuating however improving.  Cognitive screening assessment likely indicative of major neurocognitive disorder. Admission continues, pending optimal " disposition to the community.     Biological  - continue risperidone, decrease from 3mg/day to 2mg/day re: sialorrhea, c/f drug-induced mild parkinsonism    - continue Depakote ER 750mg for mood stabilization    - CMP and VPA levels today   - prns available  - c/w daily thiamine and MVI  - continue intraoral tropicamide for sialorrhea of risperdal 1% 1 drop bid swish and spit  - medical co-managing pertinences    Psychological  - Provider encourages patient to attend all groups.     Sociological  - Provider encourages patient to work with social work regarding safe discharge plan.          Nutrition/Malnutrition:  The diagnosis of malnutrition has significant impact on risk adjustment, mortality and readmission rates, length of stay and hospital reimbursement.  The below is a representation of nutrition status if evaluated.  If blank, there is no associated malnutrition finding to incorporate per the dietician team:     Total time spent with patient encounter 45 minutes. This includes patient interview, assessment, extensive chart review, personal review of labs and images as noted above, and formulation of recommendations.     MELVIN Johnson-CNP, AGPCNP-BC, PMHNP-BC  Psychiatry, [facility]  1* contact: [employer] preferred

## 2025-09-04 ASSESSMENT — PAIN - FUNCTIONAL ASSESSMENT: PAIN_FUNCTIONAL_ASSESSMENT: 0-10

## 2025-09-04 ASSESSMENT — PAIN SCALES - GENERAL: PAINLEVEL_OUTOF10: 0 - NO PAIN

## 2025-09-04 NOTE — GROUP NOTE
Group Topic: Stress Reduction/Relaxation   Group Date: 9/4/2025  Start Time: 1015  End Time: 1115  Facilitators: DENVER Mcghee   Department: [facility] RE[facility] VIRTUAL    Number of Participants: 10   Group Focus: other The Stress Management Game  Treatment Modality: Other: Recreation Therapy  Interventions utilized were clarification, exploration, group exercise, patient education, problem solving, and support  Purpose: coping skills, maladaptive thinking, feelings, irrational fears, communication skills, insight or knowledge, self-worth, self-care, relapse prevention strategies, and trigger / craving management    Name: Ellis Pollack YOB: 1960   MR: [id number]      Facilitator: Recreational Therapist  Level of Participation: active  Quality of Participation: cooperative and distractible  Interactions with others: gave feedback  Mood/Affect: flat  Triggers (if applicable): n/a  Cognition: confused  Progress: Minimal  Comments: pt problem is delusional thought.  Pt joins group with little encouragement.  Is active with participation but not appropriate with comments or questions secondary to confusion level.  Redirection offered and effective for short periods of time.   Pleasantly confused.  Plan: continue with services

## 2025-09-04 NOTE — NURSING NOTE
BIRP NOTE     Problem:  Unspecified psychosis     Behavior:  No paranoid or delusional behaviors noted this shift    Group Participation:   Moderate    Appetite/Meals:   100 x 3     Interventions:  Administered scheduled medications, provided evening snack and safe therapeutic environment     Response:  Patient consumed medication and snack without difficulty, no negative behaviors noted, patient compliant with care     Plan:  Continue current treatment plan

## 2025-09-04 NOTE — PROGRESS NOTES
"Ellis Pollack is a 64 y.o. male on day 30 of admission presenting with Atypical psychosis.      Subjective   Patient’s chart was reviewed and case was discussed with the interdisciplinary team.        No overnight concerns from staff.  He is attending groups, participates to extent. Interactions with staff and peers are appropriate, pt makes his needs known. Interactions sometimes bizarre at times. I talked to him in the hallway. He stated his mood is \"fine\", did not exhibit any delusions or paranoid comments during interaction. I didn't observe drooling, pt states it seems somewhat better. He denies dysarthria, dysphagia, tremor, gait instability. When asked he denies SI, HI, AVH.      Objective   Last Recorded Vitals  Blood pressure 128/83, pulse 84, temperature 36.8 °C (98.2 °F), temperature source Temporal, resp. rate 17, height 1.702 m (5' 7\"), weight 65.2 kg (143 lb 11.8 oz), SpO2 99%.       Sleep Log  Estimated \"Sleeping\" Durations   Night Est. Hours   08/20 8.75-9.25   08/21 6.50-6.75   08/22 9.75   08/23 9.25-9.75   08/24 7.00-8.25   08/25 7.75-8.75   08/26 9.00-9.25   08/27 7.75-8.50   08/28 7.25-7.75   08/29 8.50   08/30 9.00   08/31 8.25-8.75   09/01 9.50-10.00   09/02 6.00-6.75   09/03 7.75-8.25        Review of Systems    Psychiatric ROS - Adult  Anxiety:  When asked, he denies  Depression: When asked, he denies  Delirium: Not observed  Psychosis: did not observe any delusions during interactions today. Denies AVH.  Elizabeth: Negative  Safety Issues: when asked he denies SI, denies HI  Psychiatric ROS Comment: - as noted     Physical Exam  Vitals and nursing note reviewed.   Constitutional:       Appearance: Normal appearance.   HENT:      Mouth/Throat:      Mouth: Mucous membranes are moist.      Comments: No drooling observed during interaction  Pulmonary:      Effort: Pulmonary effort is normal.   Musculoskeletal:      Comments: No tremor, no cogwheeling or rigidity   Neurological:      Mental Status: " "He is alert.      Comments: Blink rate improved  Affect mobility improved  Gait is slow, but steady. Is not shuffling, turns appropriately, however some ?mild festination          Mental Status Exam  General: appears older than stated age, disheveled  Appearance: wearing hospital attire   Attitude: calm, confused  Behavior: cooperative  Motor Activity: No abnormal movements noted  Speech: reduced rate and volume, poorly enunciated at times   Mood: \"fine\"  Affect: mobile in comparison to yesterday  Thought Process:  more organized in comparison to prior interactions  Thought Content:  when asked he denies SI/HI  Thought Perception: when asked he denies AH/VH  Cognition: alert and oriented to self, place, time, and president  Insight: possible baseline of limited - short term memory deficits, continues to not recall why he is admitted, extent of his illness  Judgement: possible baseline of poor to fair, patient is intermittently taking medications. Cooperation has improved. Participates w/milieu     Psychiatric Risk Assessment  Violence Risk Assessment: major mental illness, male, substance abuse, and other possible developing dementia  Acute Risk of Harm to Others is Considered: low  Suicide Risk Assessment: age > 65 yrs old, , and male  Protective Factors against Suicide: other denies SI/HI, denies past SA  Acute Risk of Harm to Self is Considered: lwo      Relevant Results  VPA 8/26 44        Assessment & Plan  Atypical psychosis    Seizure disorder (Multi)    Major neurocognitive disorder (Multi)    Ellis Pollack is a 64 year old M with past psychiatric history of anxiety, psychosis, admitted to [facility] on 8/5/25 with psychotic symptoms.       Improvement in irritability and delusions, persisting with some thought disorganization, seemingly purposeless wandering behaviors.  Medication adherence fluctuating however improving.  Cognitive screening assessment likely indicative of major " neurocognitive disorder. Admission continues, pending optimal disposition to the community.     Biological  - continue risperidone 1mg BID for psychosis/agitation/anxiety    - continue Depakote ER 750mg for mood stabilization    - CMP and VPA levels today  - prns available  - c/w daily thiamine and MVI  - continue intraoral tropicamide for sialorrhea of risperdal 1% 1 drop bid swish and spit  - medical co-managing pertinences    Psychological  - Provider encourages patient to attend all groups.     Sociological  - Provider encourages patient to work with social work regarding safe discharge plan.          Nutrition/Malnutrition:  The diagnosis of malnutrition has significant impact on risk adjustment, mortality and readmission rates, length of stay and hospital reimbursement.  The below is a representation of nutrition status if evaluated.  If blank, there is no associated malnutrition finding to incorporate per the dietician team:     Total time spent with patient encounter 35 minutes. This includes patient interview, assessment, extensive chart review, personal review of labs and images as noted above, and formulation of recommendations.     MELVIN Johnson-CNP, AGPCNP-BC, PMHNP-BC  Psychiatry, [facility]  1* contact: [employer] preferred

## 2025-09-04 NOTE — PROGRESS NOTES
ALEXW met with pt's daughter Hilary and son-in-law during visitation, after getting permission from pt's wife Em. Pt had called Hilary out of the blue after having no contact since 2019. Hilary looked up the number and found out where pt was at and then called Em for more information. Em is Hilary's step-mother. Pt had Crystal when he was 15. Hilary has had limited contact with pt over the years due to pt's behavior. Hilary reports that pt has been irritable and argumentative all his life. Pt would call her up screaming at her about child support when they came after him for back pay.  LSW explained the circumstances that brought pt to the hospital and the discharge plan. Hilary understands and is in agreement that pt can not return home. Hilary endorses that pt will continue to smoke marijuana. Hilary is interested in being involved as well as finding a facility that is within 30 minutes of her residence which is [place] to be able to remain in contact with pt. Hilary and pt embraced and pt was tearful when saying goodbye. Pt and Hilary appeared to have a good connection and he was able to listen to her.     Grace Murphy, MARGARITA BLACK

## 2025-09-04 NOTE — CARE PLAN
The patient's goals for the shift include pt did not state    The clinical goals for the shift include participate in groups    Over the shift, the patient did make progress toward the following goals.       Problem: Safety - Adult  Goal: Free from fall injury  Outcome: Progressing     Problem: Chronic Conditions and Co-morbidities  Goal: Patient's chronic conditions and co-morbidity symptoms are monitored and maintained or improved  Outcome: Progressing     Problem: Nutrition  Goal: Nutrient intake appropriate for maintaining nutritional needs  Outcome: Progressing

## 2025-09-04 NOTE — GROUP NOTE
Group Topic: Goals   Group Date: 9/4/2025  Start Time: 0730  End Time: 0800  Facilitators: Betsy Herndon   Department: [facility]    Number of Participants: 9   Group Focus: goals  Treatment Modality: Interpersonal Therapy  Interventions utilized were assignment  Purpose: insight or knowledge      Name: Ellis Pollack YOB: 1960   MR: [id number]      Facilitator: {[facility] Group Facilitator:55289}  Level of Participation: {THERAPIES; PSYCH GROUP PARTICIPATION LEVEL:28567}  Quality of Participation: {THERAPIES; PSYCH QUALITY OF PARTICIPATION:16377}  Interactions with others: {THERAPIES; PSYCH INTERACTIONS:30263}  Mood/Affect: {THERAPIES; PSYCH MOOD/AFFECT:67663}  Triggers (if applicable): ***  Cognition: {THERAPIES; PSYCH COGNITION:20263}  Progress: {THERAPIES; PSYCH PROGRESS:16658}  Comments: ***  Plan: {THERAPIES; PSYCH PLAN:17254}

## 2025-09-04 NOTE — PROGRESS NOTES
"[facility]: [facility]  PROGRESS NOTE      Ellis Pollack is a 64 y.o. male that is being seen  today for follow up at [facility].  Subjective   Pt. Is being seen for follow up at [facility].  Patient has been stable.  Vital signs are stable.  Patient is a pleasantly confused.  Not able to provide adequate review of system.      ROS  Patient is pleasantly confused and not able to provide adequate review of system.  Vitals:    09/04/25 0700   BP: 128/83   Pulse: 84   Resp: 17   Temp: 36.8 °C (98.2 °F)   SpO2: 99%      Vitals:    08/13/25 1900   Weight: 65.2 kg (143 lb 11.8 oz)     Body mass index is 22.51 kg/m².  Physical Exam  Constitutional: Patient does not appear to be in any acute distress  Cardiovascular: RRR, S1/S2, no murmurs, rubs, or gallops, radial pulses +2, no edema of extremities  Pulmonary: CTAB, no respiratory distress.  Abdomen: +BS, soft, non-tender, nondistended, no guarding or rebound, no masses noted  MSK: No joint swelling, normal movements of all extremities. Range of motion- normal.  Skin- No lesions, contusions, or erythema.  Peripheral puslses palpable bilaterally 2+  Neuro: AAO X1-2, Cranial nerves 2-12 grossly intact,DTR 2+ in all 4 limbs       LABS   [unfilled]  Lab Results   Component Value Date    GLUCOSE 111 (H) 09/03/2025    CALCIUM 9.2 09/03/2025     09/03/2025    K 4.6 09/03/2025    CO2 30 09/03/2025     09/03/2025    BUN 23 09/03/2025    CREATININE 0.89 09/03/2025     Lab Results   Component Value Date    ALT 25 09/03/2025    AST 18 09/03/2025    ALKPHOS 46 09/03/2025    BILITOT 0.6 09/03/2025     Lab Results   Component Value Date    WBC 9.3 08/05/2025    HGB 14.8 08/05/2025    HCT 43.2 08/05/2025    MCV 94 08/05/2025     08/05/2025     No results found for: \"CHOL\"  No results found for: \"HDL\"  No results found for: \"LDLCALC\"  No results found for: \"TRIG\"  Lab Results   Component Value Date    HGBA1C 5.4 11/22/2021     Other labs not " included in the list above were reviewed either before or during this encounter.    History    Medical History[1]  Surgical History[2]  Family History[3]  Allergies[4]  Medications Ordered Prior to Encounter[5]  Immunization History   Administered Date(s) Administered    Flu vaccine, trivalent, preservative free, HIGH-DOSE, age 65y+ (Fluzone) 09/02/2025    Moderna SARS-CoV-2 Vaccination 04/10/2021, 05/08/2021, 12/28/2021    Pfizer COVID-19 vaccine, 12 years and older, (30mcg/0.3mL) (Comirnaty) 12/06/2023, 09/29/2024    Pfizer COVID-19 vaccine, bivalent, age 12 years and older (30 mcg/0.3 mL) 12/20/2022    Tdap vaccine, age 7 year and older (BOOSTRIX, ADACEL) 02/22/2024     Patient's medical history was reviewed and updated either before or during this encounter.  ASSESSMENT / PLAN:  Active Hospital Problems    Major neurocognitive disorder (Multi)      Seizure disorder (Multi)      *Atypical psychosis       Patient is being seen for follow-up at [facility].  Patient has seizure disorder and is on Keppra.  Patient's vital signs are stable.  Patient is being seen by [facility] team for atypical psychosis.  Patient is pleasantly confused not able to provide adequate review of systems.      Pierce Murray MD              [1] History reviewed. No pertinent past medical history.  [2] History reviewed. No pertinent surgical history.  [3] No family history on file.  [4] No Known Allergies  [5]   No current facility-administered medications on file prior to encounter.     Current Outpatient Medications on File Prior to Encounter   Medication Sig Dispense Refill    calcium carbonate 500 mg calcium (1,250 mg) chewable tablet Chew and swallow 1 tablet (500 mg of elemental calcium) once daily.      levETIRAcetam (Keppra) 500 mg tablet Take 1 tablet (500 mg) by mouth 2 times a day. 60 tablet 11    multivitamin tablet Take 1 tablet by mouth once daily.

## 2025-09-04 NOTE — GROUP NOTE
Group Topic: Art Creative   Group Date: 9/4/2025  Start Time: 1330  End Time: 1430  Facilitators: DENVER Mcghee   Department: [facility] REHABTH VIRTUAL    Number of Participants: 7   Group Focus: other Creative Art  Treatment Modality: Other: Recreation Therapy  Interventions utilized were exploration, story telling, and support  Purpose: feelings and other: fun, elevate mood, increase socialization, enhance self esteem    Name: Ellis Pollack YOB: 1960   MR: [id number]      Facilitator: Recreational Therapist  Level of Participation: active  Quality of Participation: appropriate/pleasant, attentive, and cooperative  Interactions with others: appropriate and gave feedback  Mood/Affect: appropriate  Triggers (if applicable): n/a  Cognition: pt joins group with little encouragement and is able to focus on art task.  Pt is not very talkative as he is concentrating on project.    Progress: Moderate  Comments: pt problem is delusional thought.  Plan: continue with services

## 2025-09-05 ASSESSMENT — PAIN - FUNCTIONAL ASSESSMENT
PAIN_FUNCTIONAL_ASSESSMENT: 0-10
PAIN_FUNCTIONAL_ASSESSMENT: 0-10

## 2025-09-05 ASSESSMENT — PAIN SCALES - GENERAL
PAINLEVEL_OUTOF10: 0 - NO PAIN

## 2025-09-05 NOTE — PROGRESS NOTES
SOFIA received notification that [facility] and [facility] have accepted. There are a few other facilities that are considering pt. [facility] has attempted to reach out to pt's wife to discuss. SOFIA will follow up and discuss with pt's wife on Monday.     MARGARITA Grace

## 2025-09-05 NOTE — GROUP NOTE
"Group Topic: Self Esteem   Group Date: 9/5/2025  Start Time: 1000  End Time: 1100  Facilitators: Apoorva FALL CTRS   Department: [facility] REHABTH VIRTUAL    Number of Participants: 8  Group Focus: self-awareness and self-esteem  Treatment Modality: Leisure Development and Other: education, Recreation Therapy  Interventions utilized were exploration, leisure development, reminiscence, story telling, and support, humor  Purpose: Patients engaged in group activity completing \"Self Esteem Mad Libs\". This group promotes self-reflection and self-expression while fostering a positive self-image. Pt engaged in group discussion covering multi aspects of self-esteem and then participated in creating a Mad Mady story utilizing appropriate humor to foster enhanced mood/laughter, increase socialization. This activity also aims to enhance focus and following directions by use of unique storytelling.?      Name: Ellis Pollack YOB: 1960   MR: [id number]      Facilitator: Recreational Therapist  Level of Participation: active  Quality of Participation: appropriate/pleasant, attentive, cooperative, and engaged  Interactions with others: appropriate  Mood/Affect: appropriate and brightens with interaction  Cognition: coherent/clear  Progress: Moderate  Comments: Engages easily with staff and peers. Appropriate and cooperative interactions. Displays good attention to tasks.  Use of appropriate sense of humor.   Plan: continue with services      "

## 2025-09-05 NOTE — GROUP NOTE
Group Topic: Goals   Group Date: 9/5/2025  Start Time: 0730  End Time: 0800  Facilitators: Jyoti Foote   Department: [facility]     Number of Participants: 7   Group Focus: check in  Treatment Modality: Other: Morning check in  Interventions utilized were other Discussion  Purpose: feelings and other: Check in    Name: Ellis Pollack YOB: 1960   MR: [id number]      Facilitator: [facility]  Level of Participation: moderate  Quality of Participation: appropriate/pleasant and cooperative  Interactions with others: appropriate  Mood/Affect: appropriate  Cognition: coherent/clear  Progress: Moderate  Comments: Patient problem is psychosis. Patient was active and engaged during group.  Plan: continue with services

## 2025-09-05 NOTE — PROGRESS NOTES
"LSW received results of reconsideration for [facility], which was approved for nursing facility. LSW spoke with pt's wife Em to discuss pt's visit with daughter Hilary and placement location. Em reports that she would like for pt to admit to a facility near her, which is also not too far from Hilary. LSW submitted referrals via [employer] and will wait for further response.     Pt came into LSW's office and sat down to talk about how everyone is asking him to repeat himself. Pt continued to mumble about this topic but was able to be redirected and responded to other questions asked. Pt then got up almost as quickly as he came in and said he would talk later and told LSW to \"have a good day\". Pt appeared to be calmer and less tangential compared to previous encounters.        MARGARITA GraceW   "

## 2025-09-05 NOTE — NURSING NOTE
ALLY NOTE     Problem:  Delusions     Behavior:  This patient appeared tired this morning.  His eyes beginning to close during our interaction. He did answer questions appropriately.  Pt did not state any delusions.  He is taking scheduled medications and going to groups.  Group Participation: yes  Appetite/Meals: good       Interventions:  Encouraged pt to attend groups  Encouraged pt to increase fluids   Give scheduled medications  Monitor for side effects    Response:  Pt going to groups and walking the hallways to keep active.  Pt has not expressed delusions.  He has been calm and cooperative.     Plan:  Encouraged pt to attend groups  Encouraged pt to increase fluids   Give scheduled medications  Monitor for side effects

## 2025-09-05 NOTE — PROGRESS NOTES
" REHAB Therapy Assessment & Treatment    Patient Name: Ellis Pollack  MRN: [id number]  Today's Date: 9/5/2025      Recreation Therapy note: pt is alert and oriented x 1-2 with some confusion and poor insight/judgement.  Attends some groups of choice daily and has displayed pleasantly confused behaviors/mood with interactions.  Appears to be distracted easily and often can be distracting to peers. Often needs redirection secondary to trouble focusing and confusion. Pt is eating well and reports he is sleeping \"ok\".  CTRS will continue to encourage pt to attend groups of choice daily.   "

## 2025-09-05 NOTE — PROGRESS NOTES
"Ellis Pollack is a 64 y.o. male on day 31 of admission presenting with Atypical psychosis.      Subjective   Patient’s chart was reviewed and case was discussed with the interdisciplinary team. Patient was observed sitting in the hallway. He walked to his room to meet with this provider. He denied current suicidal or homicidal ideation, as well as auditory or visual hallucinations. He reported that his mood is “ok\" and that he slept well last night. He also reported having a good breakfast. When asked about his medications, patient stated that one medication was previously taken three times daily but should now be taken twice daily, \"12 hours apart.\" When asked if he was referring to risperidone, patient did not answer. He was informed that he had been taking risperidone TID and is now prescribed 1 mg BID due to concern for sialorrhea, c/f drug-induced mild parkinsonism; however, he did not respond. No drooling was reported by patient or did not observe during encounter today. Patient also shared that his daughter, Hilary visited yesterday and is planning to visit again next week.       Objective     Last Recorded Vitals  Blood pressure 141/80, pulse 77, temperature 36.6 °C (97.9 °F), temperature source Temporal, resp. rate 16, height 1.702 m (5' 7\"), weight 65.2 kg (143 lb 11.8 oz), SpO2 98%.         Sleep Log  Estimated \"Sleeping\" Durations   Night Est. Hours   08/21 6.50-6.75   08/22 9.75   08/23 9.25-9.75   08/24 7.00-8.25   08/25 7.75-8.75   08/26 9.00-9.25   08/27 7.75-8.50   08/28 7.25-7.75   08/29 8.50   08/30 9.00   08/31 8.25-8.75   09/01 9.50-10.00   09/02 6.00-6.75   09/03 7.75-8.25   09/04 9.50-10.25        Review of Systems    Psychiatric ROS - Adult  Anxiety: Denies current symptoms  Depression: denies current symptoms  Delirium: PANCHO due to disorganized thought processes   Psychosis: Denies AVH.  Elizabeth: Negative  Safety Issues: when asked, he denies SI, denies HI  Psychiatric ROS Comment: - as " "noted    Physical Exam      Mental Status Exam  General: appears older than stated age, disheveled  Appearance: wearing hospital attire   Attitude: calm, confused  Behavior: disorganized but cooperative  Motor Activity: No abnormal movements noted  Speech: reduced rate and volume, poorly enunciated at times   Mood: \"ok\"  Affect: constricted    Thought Process:  disorganized, loose associations, tangential  Thought Content: delusions, probable paranoia, when asked he denies SI/HI  Thought Perception: when asked he denies AH/VH  Cognition: alert and oriented to self, place, time, and president  Insight: possible baseline of limited - short term memory deficits, does not recall why he is admitted, extent of his illness  Judgement: possible baseline of poor to fair, patient is intermittently taking medications, nonagitated       Psychiatric Risk Assessment  Violence Risk Assessment: major mental illness, male, substance abuse, and other possible developing dementia  Acute Risk of Harm to Others is Considered: low  Suicide Risk Assessment: age > 65 yrs old, , and male  Protective Factors against Suicide: other denies SI/HI, denies past SA  Acute Risk of Harm to Self is Considered: lwo    Relevant Results  Results for orders placed or performed during the hospital encounter of 08/05/25 (from the past 96 hours)   Valproic Acid   Result Value Ref Range    Valproic Acid 66 50 - 100 ug/mL   Comprehensive metabolic panel   Result Value Ref Range    Glucose 111 (H) 74 - 99 mg/dL    Sodium 140 136 - 145 mmol/L    Potassium 4.6 3.5 - 5.3 mmol/L    Chloride 104 98 - 107 mmol/L    Bicarbonate 30 21 - 32 mmol/L    Anion Gap 11 10 - 20 mmol/L    Urea Nitrogen 23 6 - 23 mg/dL    Creatinine 0.89 0.50 - 1.30 mg/dL    eGFR >90 >60 mL/min/1.73m*2    Calcium 9.2 8.6 - 10.3 mg/dL    Albumin 4.0 3.4 - 5.0 g/dL    Alkaline Phosphatase 46 33 - 136 U/L    Total Protein 6.3 (L) 6.4 - 8.2 g/dL    AST 18 9 - 39 U/L    Bilirubin, Total 0.6 " 0.0 - 1.2 mg/dL    ALT 25 10 - 52 U/L     Scheduled medications  Scheduled Medications[1]  Continuous medications  Continuous Medications[2]  PRN medications  PRN Medications[3]       Assessment & Plan  Atypical psychosis    Seizure disorder (Multi)    Major neurocognitive disorder (Multi)    Ellis Pollack is a 64 year old M with past psychiatric history of anxiety, psychosis, admitted to [facility] on 8/5/25 with psychotic symptoms.       Improvement in irritability and delusions, persisting with some thought disorganization, seemingly purposeless wandering behaviors.  Medication adherence fluctuating however improving.  Cognitive screening assessment likely indicative of major neurocognitive disorder. Admission continues, pending optimal disposition to the community.     Biological  - continue risperidone 1mg BID for psychosis/agitation/anxiety    - continue Depakote ER 750mg for mood stabilization     Valproic acid level 66 (09/03/25)  - prns available  - c/w daily thiamine and MVI  - continue intraoral tropicamide for sialorrhea of risperdal 1% 1 drop bid swish and spit  - medical co-managing pertinences     Psychological  - Provider encourages patient to attend all groups.     Sociological  - Provider encourages patient to work with social work regarding safe discharge plan.          Nutrition/Malnutrition:  The diagnosis of malnutrition has significant impact on risk adjustment, mortality and readmission rates, length of stay and hospital reimbursement.  The below is a representation of nutrition status if evaluated.  If blank, there is no associated malnutrition finding to incorporate per the dietician team:             I spent 28 minutes in the professional and overall care of this patient.      MELVIN Sands-CNP           [1] calcium carbonate, 1,250 mg of calcium carbonate, oral, Daily  divalproex, 750 mg, oral, Daily  multivitamin with minerals, 1 tablet, oral, Daily  risperiDONE, 1 mg, oral,  BID  thiamine, 100 mg, oral, Daily  tropicamide, 1 drop, oral, BID  [2]    [3] PRN medications: acetaminophen, alum-mag hydroxide-simeth, hydrOXYzine HCL, magnesium hydroxide, nicotine polacrilex, OLANZapine **OR** OLANZapine, traZODone

## 2025-09-05 NOTE — NURSING NOTE
ALLY NOTE     Problem:  Delusions     Behavior:  Pt sitting in group room having snack watching tv. Alert and oriented this evening, pt expresses concern about increased drooling, which was evident during conversation with this RN. Pt states he used to take one pill in the morning which lasted all day and he was also able to sleep. Pt wondering if decreasing the risperdal to daily instead of BID would effectively maintain his functionality while lessening side effects. Compliant with meds, pleasant and cooperative with care.  Group Participation: n/a  Appetite/Meals: HS snack provided     Interventions:  1:1 provided with active listening. Evening meds administered per orders.    Response:  Pt calm, resting in bed at this time.     Plan:  Will continue to monitor behaviors and effectiveness of interventions while maintaining pt safety.

## 2025-09-05 NOTE — GROUP NOTE
Group Topic: Other   Group Date: 9/5/2025  Start Time: 1330  End Time: 1430  Facilitators: BONNIE ColeS   Department: [facility] REHABTH VIRTUAL    Number of Participants: 9   Group Focus: Trivia- leisure skills, reminiscence, self-esteem, and social skills,  Treatment Modality: Leisure Development and Other: Recreation Therapy  Interventions utilized were group exercise, leisure development, mental fitness, and reminiscence  Purpose: In this Recreation Therapy group of “Trivia” patients were prompted to engage together in a game which aims to promote increased socialization, motivation, activity level, following directions, concentration along with utilizing mental fitness.     Name: Ellis Pollack YOB: 1960   MR: [id number]      Facilitator: Recreational Therapist  Level of Participation: active  Quality of Participation: appropriate/pleasant, cooperative, and engaged  Interactions with others: appropriate  Mood/Affect: appropriate  Cognition: coherent/clear  Progress: Moderate  Comments: pt joins group half way. Engages when prompted. Able to participate appropriately and follow along.   Plan: continue with services

## 2025-09-05 NOTE — GROUP NOTE
Group Topic: Music Therapy   Group Date: 9/5/2025  Start Time: 1100  End Time: 1200  Facilitators: Trinh Millard   Department: [facility] EXPRESSIVE THER VIRTUAL    Number of Participants: 9   Group Focus: communication/socialization, coping skills/planning, expressive outlet, feeling awareness/expression, and opportunity for choice/control  Treatment Modality: Music Therapy  Interventions Utilized were: active music engagement, empathic listening/validating emotions, recording, reminiscence, sharing/discussion, and support    Patients were engaged in live music listening/music games to address goals of socialization, self-expression, and communication. Patients were engaged in music games/music listening to provide opportunities to share songs that give them motivation, share their preferred music, and discuss their connections to music with the group.   Name: Ellis Pollack YOB: 1960   MR: [id number]      Level of Participation: moderate  Quality of Participation: appropriate/pleasant, drowsy, redirectable, and side talking  Interactions with others: asked thoughtful questions  Mood/Affect: appropriate  Cognition, Pre Treatment: processing slowly  Cognition, Post Treatment: attentive, insightful, and processing slowly  Progress: Moderate  Plan: continue with services    Pt participated in group by sharing music preferences and answering questions for music game. Throughout group, pt would call out lyrics from a piece of paper he was writing on that was inconsistent with group conversation, but was redirectable/pleasant.

## 2025-09-05 NOTE — CARE PLAN
The clinical goals for the shift includemaintain safety, promote rest.    Over the shift, the patient did make progress toward the following goals.     Problem: Pain - Adult  Goal: Verbalizes/displays adequate comfort level or baseline comfort level  Outcome: Progressing

## 2025-09-05 NOTE — CARE PLAN
The patient's goals for the shift include go to groups    The clinical goals for the shift include participate in group activities    Over the shift, the patient did make progress toward the following goals.       Problem: Chronic Conditions and Co-morbidities  Goal: Patient's chronic conditions and co-morbidity symptoms are monitored and maintained or improved  Outcome: Progressing     Problem: Fall/Injury  Goal: Not fall by end of shift  Outcome: Progressing

## 2025-09-05 NOTE — GROUP NOTE
Group Topic: Goals   Group Date: 9/5/2025  Start Time: 2000  End Time: 2015  Facilitators: Meseret Yang   Department: [facility] Geriatric     Number of Participants: 10   Group Focus: check in  Treatment Modality: Interpersonal Therapy  Interventions utilized were reminiscence  Purpose: self-care    Name: Ellis Pollack YOB: 1960   MR: [id number]      Facilitator: [facility]  Level of Participation: minimal  Quality of Participation: appropriate/pleasant  Interactions with others: appropriate  Mood/Affect: blunted  Triggers (if applicable): none  Cognition: confused  Progress: Minimal  Comments: patient presented with atypical psychosis  Plan: continue with services

## 2025-09-05 NOTE — PROGRESS NOTES
"[facility]: [facility]  PROGRESS NOTE      Ellis Pollack is a 64 y.o. male that is being seen  today for follow up at [facility].  Subjective   Pt. Is being seen for follow up at [facility].  Patient has been stable.  Vital signs are stable.  Patient is a pleasantly confused.  Not able to provide adequate review of system.      ROS  Patient is pleasantly confused and not able to provide adequate review of system.  Vitals:    09/05/25 0756   BP: 141/80   Pulse: 77   Resp: 16   Temp: 36.6 °C (97.9 °F)   SpO2: 98%      Vitals:    08/13/25 1900   Weight: 65.2 kg (143 lb 11.8 oz)     Body mass index is 22.51 kg/m².  Physical Exam  Constitutional: Patient does not appear to be in any acute distress  Cardiovascular: RRR, S1/S2, no murmurs, rubs, or gallops, radial pulses +2, no edema of extremities  Pulmonary: CTAB, no respiratory distress.  Abdomen: +BS, soft, non-tender, nondistended, no guarding or rebound, no masses noted  MSK: No joint swelling, normal movements of all extremities. Range of motion- normal.  Skin- No lesions, contusions, or erythema.  Peripheral puslses palpable bilaterally 2+  Neuro: AAO X1-2, Cranial nerves 2-12 grossly intact,DTR 2+ in all 4 limbs       LABS   [unfilled]  Lab Results   Component Value Date    GLUCOSE 111 (H) 09/03/2025    CALCIUM 9.2 09/03/2025     09/03/2025    K 4.6 09/03/2025    CO2 30 09/03/2025     09/03/2025    BUN 23 09/03/2025    CREATININE 0.89 09/03/2025     Lab Results   Component Value Date    ALT 25 09/03/2025    AST 18 09/03/2025    ALKPHOS 46 09/03/2025    BILITOT 0.6 09/03/2025     Lab Results   Component Value Date    WBC 9.3 08/05/2025    HGB 14.8 08/05/2025    HCT 43.2 08/05/2025    MCV 94 08/05/2025     08/05/2025     No results found for: \"CHOL\"  No results found for: \"HDL\"  No results found for: \"LDLCALC\"  No results found for: \"TRIG\"  Lab Results   Component Value Date    HGBA1C 5.4 11/22/2021     Other labs not " included in the list above were reviewed either before or during this encounter.    History    Medical History[1]  Surgical History[2]  Family History[3]  Allergies[4]  Medications Ordered Prior to Encounter[5]  Immunization History   Administered Date(s) Administered    Flu vaccine, trivalent, preservative free, HIGH-DOSE, age 65y+ (Fluzone) 09/02/2025    Moderna SARS-CoV-2 Vaccination 04/10/2021, 05/08/2021, 12/28/2021    Pfizer COVID-19 vaccine, 12 years and older, (30mcg/0.3mL) (Comirnaty) 12/06/2023, 09/29/2024    Pfizer COVID-19 vaccine, bivalent, age 12 years and older (30 mcg/0.3 mL) 12/20/2022    Tdap vaccine, age 7 year and older (BOOSTRIX, ADACEL) 02/22/2024     Patient's medical history was reviewed and updated either before or during this encounter.  ASSESSMENT / PLAN:  Active Hospital Problems    Major neurocognitive disorder (Multi)      Seizure disorder (Multi)      *Atypical psychosis       Patient is being seen for follow-up at [facility].  Patient has seizure disorder and is on Keppra.  Patient's vital signs are stable.  Patient is being seen by [facility] team for atypical psychosis.  Patient is pleasantly confused not able to provide adequate review of systems.      Pierce Murray MD                [1] History reviewed. No pertinent past medical history.  [2] History reviewed. No pertinent surgical history.  [3] No family history on file.  [4] No Known Allergies  [5]   No current facility-administered medications on file prior to encounter.     Current Outpatient Medications on File Prior to Encounter   Medication Sig Dispense Refill    calcium carbonate 500 mg calcium (1,250 mg) chewable tablet Chew and swallow 1 tablet (500 mg of elemental calcium) once daily.      levETIRAcetam (Keppra) 500 mg tablet Take 1 tablet (500 mg) by mouth 2 times a day. 60 tablet 11    multivitamin tablet Take 1 tablet by mouth once daily.

## 2025-09-05 NOTE — GROUP NOTE
Group Topic: Reminiscence   Group Date: 9/4/2025  Start Time: 2005  End Time: 2022  Facilitators: Karli Edward   Department: [facility]    Number of Participants: 6   Group Focus: reminiscence  Treatment Modality: Other: PCA  Interventions utilized were reminiscence  Purpose: feelings and communication skills    Name: Ellis Pollack YOB: 1960   MR: [id number]      Facilitator: Mental [facility]  Level of Participation: did not attend  Quality of Participation: did not attend  Interactions with others: did not attend  Mood/Affect: did not attend  Triggers (if applicable): N/A  Cognition: did not attend  Progress: Other  Comments: Pt problem is atypical psychosis. Pt declined the invitation to attend group.   Plan: continue with services

## 2025-09-05 NOTE — PROGRESS NOTES
"Nutrition Follow Up Assessment  Nutrition Assessment         Patient is a 64 y.o. male who is admitted for atypical psychosis.     8/29 clinical swallow evaluation - SLP recommending regular diet with thin liquids    Patient was assessed virtually.    Nutrition History:  Energy Intake: Good > 75 %  Food and Nutrient History: Pt continues with good intakes. Per flowsheets, pt documented with 100% intake at 20 meals in the past week.    Anthropometrics:  Height: 170.2 cm (5' 7\")   Weight: 65.2 kg (143 lb 11.8 oz)   BMI (Calculated): 22.51             Admission Weight Trend:  Weight         8/5/2025  1829 8/5/2025  2036 8/13/2025  1900       Weight: 61.3 kg (135 lb 2.3 oz) 61.2 kg (135 lb) 65.2 kg (143 lb 11.8 oz)             Weight Change %:  Weight History / % Weight Change: per wt hx, pt with a 15# (10%) wt loss over ~5 months (3/6-8/5). updated wt of 143# on 8/13 - now question if wt of 135# from 8/5 was accurate. No new weights since 8/13.    Nutrition Focused Physical Exam Findings:  defer: remote assessment      Nutrition Significant Labs:  BMP Trend:   Results from last 7 days   Lab Units 09/03/25  1354   GLUCOSE mg/dL 111*   CALCIUM mg/dL 9.2   SODIUM mmol/L 140   POTASSIUM mmol/L 4.6   CO2 mmol/L 30   CHLORIDE mmol/L 104   BUN mg/dL 23   CREATININE mg/dL 0.89        Nutrition Specific Medications:  Scheduled medications  Scheduled Medications[1]  Continuous medications  Continuous Medications[2]  PRN medications  PRN Medications[3]      I/O:    ;      Dietary Orders (From admission, onward)       Start     Ordered    08/30/25 1213  Adult diet Cardiac; 70 gm fat; 2 - 3 grams Sodium; Thin 0  Diet effective now        Question Answer Comment   Diet type Cardiac    Fat restriction: 70 gm fat    Sodium restriction: 2 - 3 grams Sodium    Fluid consistency Thin 0        08/30/25 1212                     Estimated Needs:   Total Energy Estimated Needs in 24 hours (kCal): 1841 kCal  Method for Estimating Needs: actual " wt  Total Protein Estimated Needs in 24 Hours (g): 74 g  Method for Estimating 24 Hour Protein Needs: actual wt     Method for Estimating 24 Hour Fluid Needs: 1 ml/kcal or per MD        Nutrition Diagnosis   Malnutrition Diagnosis  Patient has Malnutrition Diagnosis: No    Nutrition Diagnosis  Patient has Nutrition Diagnosis: Yes  Diagnosis Status (1): Resolved  Nutrition Diagnosis 1: Unintended weight loss  Related to (1): decreased ability to consume/tolerate sufficient energy  As Evidenced by (1): 15# (10%) wt loss over ~5 months       Nutrition Interventions/Recommendations         Nutrition Prescription:  Individualized Nutrition Prescription Provided for : Continue cardiac diet as ordered        Nutrition Interventions:   Food and/or Nutrient Delivery Interventions  Interventions: Meals and snacks  Meals and Snacks: Mineral-modified diet, Fat-modified diet  Goal: provide as ordered    Nutrition Education:          Nutrition Monitoring and Evaluation   Food/Nutrient Related History Monitoring  Monitoring and Evaluation Plan: Estimated Energy Intake  Estimated Energy Intake: Energy intake greater or equal to 75% of estimated energy needs    Anthropometric Measurements  Monitoring and Evaluation Plan: Body weight  Body Weight: Body weight - Maintain stable weight               Goal Status: Goal(s) achieved  Time Spent/Follow-up Reminder:   Time Spent (min): 30 minutes  Last Date of Nutrition Visit: 09/05/25  Nutrition Follow-Up Needed?: 7-10 days  Follow up Comment: 9/12-9/15       [1] calcium carbonate, 1,250 mg of calcium carbonate, oral, Daily  divalproex, 750 mg, oral, Daily  multivitamin with minerals, 1 tablet, oral, Daily  risperiDONE, 1 mg, oral, BID  thiamine, 100 mg, oral, Daily  tropicamide, 1 drop, oral, BID  [2]    [3] PRN medications: acetaminophen, alum-mag hydroxide-simeth, hydrOXYzine HCL, magnesium hydroxide, nicotine polacrilex, OLANZapine **OR** OLANZapine, traZODone

## 2025-09-06 ASSESSMENT — PAIN SCALES - GENERAL
PAINLEVEL_OUTOF10: 0 - NO PAIN
PAINLEVEL_OUTOF10: 0 - NO PAIN

## 2025-09-06 NOTE — GROUP NOTE
Group Topic: Gratitude   Group Date: 9/6/2025  Start Time: 0730  End Time: 0800  Facilitators: Alfonso Glover   Department: [facility]    Number of Participants: 5   Group Focus: goals  Treatment Modality: Patient-Centered Therapy  Interventions utilized were assignment  Purpose: self-care    Name: Ellis Pollack YOB: 1960   MR: [id number]      Facilitator: Mental Health [facility]  Level of Participation: moderate  Quality of Participation: appropriate/pleasant  Interactions with others: appropriate  Mood/Affect: appropriate  Triggers (if applicable): n/a  Cognition: coherent/clear  Progress: Moderate  Comments: pt was active in discussion  Plan: continue with services

## 2025-09-06 NOTE — CARE PLAN
The patient's goals for the shift include go to groups    The clinical goals for the shift include maintain safety    Over the shift, the patient did make progress toward the following goals.

## 2025-09-06 NOTE — GROUP NOTE
Group Topic: Leisure Skills   Group Date: 9/6/2025  Start Time: 1045  End Time: 1145  Facilitators: DENVER Mcghee   Department: [facility] REHABTH VIRTUAL    Number of Participants: 9   Group Focus: other Leisure Awareness  Treatment Modality: Other: Recreation Therapy  Interventions utilized were exploration, group exercise, patient education, problem solving, and support  Purpose: coping skills, maladaptive thinking, feelings, irrational fears, communication skills, insight or knowledge, self-worth, self-care, relapse prevention strategies, and trigger / craving management    Name: Ellis Pollack YOB: 1960   MR: [id number]      Facilitator: Recreational Therapist  Level of Participation: minimal  Quality of Participation: pt sleeping on and off during group at this time.    Interactions with others: sleeping on and off during group  Mood/Affect: sleeping  Triggers (if applicable): n/a  Cognition: not focused  Progress: Minimal  Comments: pt problem is delusional thought.  Plan: continue with services

## 2025-09-06 NOTE — PROGRESS NOTES
"Ellis Pollack is a 64 y.o. male on day 32 of admission presenting with Atypical psychosis.      Subjective   Chart reviewed and case discussed with nursing.    No overnight concerns from staff. Pt attending groups, participates at times appropriately. Observed less drooling by staff, no EPS sx observed. He didn't sleep well last night, he does not recall why. When asked he denies intolerance or side effects to his medications, he denies SI/HI, AVH. Most interactions remain disorganized.       Objective     Last Recorded Vitals  Blood pressure 115/81, pulse 80, temperature 36.7 °C (98.1 °F), temperature source Temporal, resp. rate 17, height 1.702 m (5' 7\"), weight 65.2 kg (143 lb 11.8 oz), SpO2 97%.         Sleep Log  Estimated \"Sleeping\" Durations   Night Est. Hours   08/22 9.75   08/23 9.25-9.75   08/24 7.00-8.25   08/25 7.75-8.75   08/26 9.00-9.25   08/27 7.75-8.50   08/28 7.25-7.75   08/29 8.50   08/30 9.00   08/31 8.25-8.75   09/01 9.50-10.00   09/02 6.00-6.75   09/03 7.75-8.25   09/04 9.50-10.25   09/05 6.00-7.75        Review of Systems    Psychiatric ROS - Adult  Anxiety: Denies current symptoms  Depression: denies current symptoms  Delirium: Negative  Psychosis: Denies AVH.  Elizabeth: Negative  Safety Issues: when asked, he denies SI, denies HI  Psychiatric ROS Comment: - as noted    Physical Exam      Mental Status Exam  General: appears older than stated age, disheveled  Appearance: wearing hospital attire   Attitude: calm, confused  Behavior: disorganized but cooperative  Motor Activity: No abnormal movements noted  Speech: reduced rate and volume, poorly enunciated at times   Mood: \"I'm fine\"  Affect: constricted    Thought Process:  disorganized, loose associations, tangential  Thought Content: delusions, probable paranoia, when asked he denies SI/HI  Thought Perception: when asked he denies AH/VH  Cognition: alert and oriented to self, place, time, and president  Insight: possible baseline of limited - " short term memory deficits, does not recall why he is admitted, extent of his illness  Judgement: possible baseline of poor to fair, patient is intermittently taking medications, nonagitated       Psychiatric Risk Assessment  Violence Risk Assessment: major mental illness, male, substance abuse, and other possible developing dementia  Acute Risk of Harm to Others is Considered: low  Suicide Risk Assessment: age > 65 yrs old, , and male  Protective Factors against Suicide: other denies SI/HI, denies past SA  Acute Risk of Harm to Self is Considered: low    Relevant Results  Results for orders placed or performed during the hospital encounter of 08/05/25 (from the past 96 hours)   Valproic Acid   Result Value Ref Range    Valproic Acid 66 50 - 100 ug/mL   Comprehensive metabolic panel   Result Value Ref Range    Glucose 111 (H) 74 - 99 mg/dL    Sodium 140 136 - 145 mmol/L    Potassium 4.6 3.5 - 5.3 mmol/L    Chloride 104 98 - 107 mmol/L    Bicarbonate 30 21 - 32 mmol/L    Anion Gap 11 10 - 20 mmol/L    Urea Nitrogen 23 6 - 23 mg/dL    Creatinine 0.89 0.50 - 1.30 mg/dL    eGFR >90 >60 mL/min/1.73m*2    Calcium 9.2 8.6 - 10.3 mg/dL    Albumin 4.0 3.4 - 5.0 g/dL    Alkaline Phosphatase 46 33 - 136 U/L    Total Protein 6.3 (L) 6.4 - 8.2 g/dL    AST 18 9 - 39 U/L    Bilirubin, Total 0.6 0.0 - 1.2 mg/dL    ALT 25 10 - 52 U/L     Scheduled medications  Scheduled Medications[1]  Continuous medications  Continuous Medications[2]  PRN medications  PRN Medications[3]       Assessment & Plan  Atypical psychosis    Seizure disorder (Multi)    Major neurocognitive disorder (Multi)    Ellis Pollack is a 64 year old M with past psychiatric history of anxiety, psychosis, admitted to [facility] on 8/5/25 with psychotic symptoms.       Improvement in irritability and delusions, persisting with some thought disorganization, seemingly purposeless wandering behaviors.  Medication adherence fluctuating however improving.   Cognitive screening assessment likely indicative of major neurocognitive disorder. Admission continues, pending optimal disposition to the community.     Biological  - continue risperidone 1mg BID for psychosis/agitation/anxiety    - continue Depakote ER 750mg for mood stabilization     Valproic acid level 66 (09/03/25)  - prns available  - c/w daily thiamine and MVI  - continue intraoral tropicamide for sialorrhea of risperdal 1% 1 drop bid PRN swish and spit  - melatonin 3mg at 1800 for sleep/wake cycle regulation  - schedule trazodone 25mg bedtime for sleep aide  - medical co-managing pertinences     Psychological  - Provider encourages patient to attend all groups.     Sociological  - Provider encourages patient to work with social work regarding safe discharge plan.          Nutrition/Malnutrition:  The diagnosis of malnutrition has significant impact on risk adjustment, mortality and readmission rates, length of stay and hospital reimbursement.  The below is a representation of nutrition status if evaluated.  If blank, there is no associated malnutrition finding to incorporate per the dietician team:         Total time spent with patient encounter 35 minutes. This includes patient interview, assessment, extensive chart review, personal review of labs and images as noted above, and formulation of recommendations.     Wes Urena, APRN-CNP, AGPCNP-BC, PMHNP-BC  Psychiatry, [facility]  1* contact: Epic chat preferred           [1] calcium carbonate, 1,250 mg of calcium carbonate, oral, Daily  divalproex, 750 mg, oral, Daily  melatonin, 3 mg, oral, Daily  multivitamin with minerals, 1 tablet, oral, Daily  risperiDONE, 1 mg, oral, BID  thiamine, 100 mg, oral, Daily  traZODone, 25 mg, oral, Nightly     [2]    [3] PRN medications: acetaminophen, alum-mag hydroxide-simeth, hydrOXYzine HCL, magnesium hydroxide, nicotine polacrilex, OLANZapine **OR** OLANZapine, tropicamide

## 2025-09-06 NOTE — GROUP NOTE
Group Topic: Other   Group Date: 9/6/2025  Start Time: 0945  End Time: 1045  Facilitators: DENVER Mcghee   Department: [facility] REHABTH VIRTUAL    Number of Participants: 10   Group Focus: other Getting to Know You  Treatment Modality: Other: Recreation Therapy  Interventions utilized were reminiscence and story telling  Purpose: other: fun, elevate mood, increase socialization, enhance self esteem    Name: Ellis Pollack YOB: 1960   MR: [id number]      Facilitator: Recreational Therapist  Level of Participation: active  Quality of Participation: appropriate/pleasant, cooperative, and distractible  Interactions with others: appropriate, gave feedback, and intrusive  Mood/Affect: pleasantly confused  Triggers (if applicable): n/a  Cognition: confused  Progress: Minimal  Comments: pt problem is delusional thought.  Pt joins group with little encouragement.  Engages with pleasantly confused mood/behaviors.   Plan: continue with services

## 2025-09-06 NOTE — NURSING NOTE
ALLY NOTE     Problem:  Delusions     Behavior:  Patient is quiet, calm and cooperative. He is confused about his daughter bringing him a toaster. He keeps asking if she brought it in for him and when can he call her to ask her to bring it. He was informed that that is not possible but he does not remember that it was explained to him that it will not be brought in. No other issues to note and no complaints made or noted.    Group Participation: Yes    Appetite/Meals: HS snacks provided       Interventions:  Administered scheduled medications    Response:  Compliant with care and medications     Plan:  Continue current plan of care

## 2025-09-06 NOTE — GROUP NOTE
"Group Topic: Art Creative   Group Date: 9/6/2025  Start Time: 1330  End Time: 1430  Facilitators: DENVER Mcghee   Department: [facility] REHABTH [facility]    Number of Participants: 10   Group Focus: other Art Time  Treatment Modality: Other: Recreation Therapy  Interventions utilized were exploration and mental fitness  Purpose: feelings and other: fun, elevate mood, increase socialization    Name: Ellis Pollack YOB: 1960   MR: [id number]      Facilitator: Recreational Therapist  Level of Participation: active  Quality of Participation: appropriate/pleasant, attentive, and cooperative  Interactions with others: appropriate  Mood/Affect: appropriate  Triggers (if applicable): n/a  Cognition: focused on art project  Progress: Moderate  Comments: pt problem is delusional thought.  Pt is very focused on art project, reports he \"is concentrating hard to make my paper look nice\".  Not very engaging with peers but calm and cooperative with behavior.  Plan: continue with services      "

## 2025-09-06 NOTE — CARE PLAN
The patient's goals for the shift include less drooling    The clinical goals for the shift include maintain safety    Over the shift, the patient did not make progress toward the following goals. Barriers to progression include forgetful at times. Recommendations to address these barriers include frequent redirection.

## 2025-09-07 ASSESSMENT — PAIN SCALES - GENERAL: PAINLEVEL_OUTOF10: 0 - NO PAIN

## 2025-09-07 NOTE — GROUP NOTE
Group Topic: Self-Care/Wellness   Group Date: 9/7/2025  Start Time: 1030  End Time: 1130  Facilitators: DENVER Mcghee   Department: [facility] REHABTH VIRTUAL    Number of Participants: 11   Group Focus: other Building New Habits  Treatment Modality: Other: Recreation Therapy  Interventions utilized were exploration, group exercise, patient education, problem solving, and support  Purpose: coping skills, maladaptive thinking, feelings, irrational fears, communication skills, insight or knowledge, self-worth, self-care, relapse prevention strategies, and trigger / craving management    Name: Ellis Pollack YOB: 1960   MR: [id number]      Facilitator: Recreational Therapist  Level of Participation: active  Quality of Participation: appropriate/pleasant  Interactions with others: pt continues to display pleasantly confused participation during group.  Mood/Affect: appropriate  Triggers (if applicable): n/a  Cognition: confused  Progress: Minimal  Comments: pt problem is delusional thought.  Plan: continue with services

## 2025-09-07 NOTE — GROUP NOTE
Group Topic: Goals   Group Date: 9/7/2025  Start Time: 0730  End Time: 0800  Facilitators: Alfonso Glover   Department: [facility] Geriatric     Number of Participants: 7   Group Focus: goals  Treatment Modality: Patient-Centered Therapy  Interventions utilized were assignment  Purpose: self-care    Name: Ellis Pollack YOB: 1960   MR: [id number]      Facilitator: Mental Health [facility]NA  Level of Participation: minimal  Quality of Participation: appropriate/pleasant  Interactions with others: appropriate  Mood/Affect: appropriate  Triggers (if applicable): n/a  Cognition: coherent/clear  Progress: Moderate  Comments: pt was active in discussion with peers  Plan: continue with services

## 2025-09-07 NOTE — GROUP NOTE
Group Topic: Goals   Group Date: 9/6/2025  Start Time: 2010  End Time: 2020  Facilitators: Meseret Yang   Department: [facility] Geriatric     Number of Participants: 12   Group Focus: check in  Treatment Modality: Interpersonal Therapy  Interventions utilized were mental fitness  Purpose: feelings and self-care    Name: Ellis Pollack YOB: 1960   MR: [id number]      Facilitator: Mental Health PCNA  Level of Participation: minimal  Quality of Participation: appropriate/pleasant  Interactions with others: appropriate  Mood/Affect: appropriate  Triggers (if applicable): none  Cognition: confused  Progress: Minimal  Comments: patient presented with atypical psychosis  Plan: continue with services

## 2025-09-07 NOTE — GROUP NOTE
Group Topic: Open Recreation   Group Date: 9/7/2025  Start Time: 1330  End Time: 1430  Facilitators: DENVER Mcghee   Department: [facility] REHABTH [facility]    Number of Participants: 12   Group Focus: other Art Group/XING Game  Treatment Modality: Other: Recreation Therapy  Interventions utilized were exploration, mental fitness, reminiscence, and story telling  Purpose: feelings and other: fun, elevate mood, increase socialization    Name: Ellis Pollack YOB: 1960   MR: [id number]      Facilitator: Recreational Therapist  Level of Participation: active  Quality of Participation: appropriate/pleasant, attentive, and cooperative  Interactions with others: appropriate and gave feedback  Mood/Affect: appropriate  Triggers (if applicable): n/a  Cognition: confused  Progress: Moderate  Comments: pt problem is delusional thought.  Pt is intently focused on art project.  After project is complete, pt appropriately engages with peers about football game.    Plan: continue with services

## 2025-09-07 NOTE — CARE PLAN
The patient's goals for the shift include listen to music    The clinical goals for the shift include maintain safety    Over the shift, the patient did not make progress toward the following goals. Barriers to progression include forgetful at times. Recommendations to address these barriers include frequent redirection.

## 2025-09-07 NOTE — NURSING NOTE
ALLY NOTE     Problem:  Delusions     Behavior:  Patient was pleasant and cooperative. He was observed in the group room watching television and keeping to himself. Patient is quiet and calm today. No complaints made or noted at this time.    Group Participation: Yes    Appetite/Meals: HS snacks provided       Interventions:  Administered scheduled medications    Response:  Compliant with care and medications     Plan:  Continue current plan of care

## 2025-09-07 NOTE — PROGRESS NOTES
"Ellis Pollack is a 64 y.o. male on day 33 of admission presenting with Atypical psychosis.      Subjective   Chart reviewed and case discussed with nursing.    No overnight concerns from staff. Pt attending groups, participates at times appropriately. No drooling or EPS observed. Most interactions remain slightly disorganized.        Objective     Last Recorded Vitals  Blood pressure 121/83, pulse 84, temperature 36.4 °C (97.5 °F), temperature source Temporal, resp. rate 17, height 1.702 m (5' 7\"), weight 65.2 kg (143 lb 11.8 oz), SpO2 98%.         Sleep Log  Estimated \"Sleeping\" Durations   Night Est. Hours   08/23 9.25-9.75   08/24 7.00-8.25   08/25 7.75-8.75   08/26 9.00-9.25   08/27 7.75-8.50   08/28 7.25-7.75   08/29 8.50   08/30 9.00   08/31 8.25-8.75   09/01 9.50-10.00   09/02 6.00-6.75   09/03 7.75-8.25   09/04 9.50-10.25   09/05 6.00-7.75   09/06 8.00-8.50        Review of Systems    Psychiatric ROS - Adult  Anxiety: Denies current symptoms  Depression: denies current symptoms  Delirium: Negative  Psychosis: Denies AVH.  Elizabeth: Negative  Safety Issues: when asked, he denies SI, denies HI  Psychiatric ROS Comment: - as noted    Physical Exam      Mental Status Exam  General: appears older than stated age, disheveled  Appearance: wearing hospital attire   Attitude: calm, confused  Behavior: disorganized but cooperative  Motor Activity: No abnormal movements noted  Speech: reduced rate and volume, poorly enunciated at times   Mood: \"good, why?\"  Affect: constricted, confused at times  Thought Process:  disorganized   Thought Content: delusions, probable paranoia, when asked he denies SI/HI  Thought Perception: when asked he denies AH/VH  Cognition: alert and oriented to self, place, time, and president  Insight: possible baseline of limited - short term memory deficits, does not recall why he is admitted, extent of his illness  Judgement: possible baseline of poor to fair, patient is intermittently taking " medications, nonagitated       Psychiatric Risk Assessment  Violence Risk Assessment: major mental illness, male, substance abuse, and other possible developing dementia  Acute Risk of Harm to Others is Considered: low  Suicide Risk Assessment: age > 65 yrs old, , and male  Protective Factors against Suicide: other denies SI/HI, denies past SA  Acute Risk of Harm to Self is Considered: low    Relevant Results  Results for orders placed or performed during the hospital encounter of 08/05/25 (from the past 96 hours)   Valproic Acid   Result Value Ref Range    Valproic Acid 66 50 - 100 ug/mL   Comprehensive metabolic panel   Result Value Ref Range    Glucose 111 (H) 74 - 99 mg/dL    Sodium 140 136 - 145 mmol/L    Potassium 4.6 3.5 - 5.3 mmol/L    Chloride 104 98 - 107 mmol/L    Bicarbonate 30 21 - 32 mmol/L    Anion Gap 11 10 - 20 mmol/L    Urea Nitrogen 23 6 - 23 mg/dL    Creatinine 0.89 0.50 - 1.30 mg/dL    eGFR >90 >60 mL/min/1.73m*2    Calcium 9.2 8.6 - 10.3 mg/dL    Albumin 4.0 3.4 - 5.0 g/dL    Alkaline Phosphatase 46 33 - 136 U/L    Total Protein 6.3 (L) 6.4 - 8.2 g/dL    AST 18 9 - 39 U/L    Bilirubin, Total 0.6 0.0 - 1.2 mg/dL    ALT 25 10 - 52 U/L   Urinalysis with Reflex Culture   Result Value Ref Range    Color, Urine Yellow Light-Yellow, Yellow, Dark-Yellow    Appearance, Urine Clear Clear    Specific Gravity, Urine 1.021 1.005 - 1.035    pH, Urine 7.0 5.0, 5.5, 6.0, 6.5, 7.0, 7.5, 8.0    Protein, Urine NEGATIVE NEGATIVE, 10 (TRACE), 20 (TRACE) mg/dL    Glucose, Urine Normal Normal mg/dL    Blood, Urine NEGATIVE NEGATIVE mg/dL    Ketones, Urine TRACE (A) NEGATIVE mg/dL    Bilirubin, Urine NEGATIVE NEGATIVE mg/dL    Urobilinogen, Urine Normal Normal mg/dL    Nitrite, Urine NEGATIVE NEGATIVE    Leukocyte Esterase, Urine NEGATIVE NEGATIVE     Scheduled medications  Scheduled Medications[1]  Continuous medications  Continuous Medications[2]  PRN medications  PRN Medications[3]       Assessment &  Plan  Atypical psychosis    Seizure disorder (Multi)    Major neurocognitive disorder (Multi)    Ellis Pollack is a 64 year old M with past psychiatric history of anxiety, psychosis, admitted to [facility] on 8/5/25 with psychotic symptoms.       Improvement in irritability and delusions, persisting with some thought disorganization, seemingly purposeless wandering behaviors.  Medication adherence fluctuating however improving.  Cognitive screening assessment likely indicative of major neurocognitive disorder. Admission continues, pending optimal disposition to the community.     Biological  - continue risperidone 1mg BID for psychosis/agitation/anxiety    - continue Depakote ER 750mg for mood stabilization     Valproic acid level 66 (09/03/25)  - prns available  - c/w daily thiamine and MVI  - continue intraoral tropicamide for sialorrhea of risperdal 1% 1 drop bid PRN swish and spit  - melatonin 3mg at 1800 for sleep/wake cycle regulation  - scheduled trazodone 25mg bedtime for sleep aide  - medical co-managing pertinences     Psychological  - Provider encourages patient to attend all groups.     Sociological  - Provider encourages patient to work with social work regarding safe discharge plan.          Nutrition/Malnutrition:  The diagnosis of malnutrition has significant impact on risk adjustment, mortality and readmission rates, length of stay and hospital reimbursement.  The below is a representation of nutrition status if evaluated.  If blank, there is no associated malnutrition finding to incorporate per the dietician team:         Total time spent with patient encounter 25 minutes. This includes patient interview, assessment, extensive chart review, personal review of labs and images as noted above, and formulation of recommendations.     Wes Urena, MELVIN-CNP, AGPCNP-BC, PMHNP-BC  Psychiatry, [facility]  1* contact: [facility] chat preferred             [1] calcium carbonate, 1,250 mg of calcium  carbonate, oral, Daily  divalproex, 750 mg, oral, Daily  melatonin, 3 mg, oral, Daily  multivitamin with minerals, 1 tablet, oral, Daily  risperiDONE, 1 mg, oral, BID  thiamine, 100 mg, oral, Daily  traZODone, 25 mg, oral, Nightly     [2]    [3] PRN medications: acetaminophen, alum-mag hydroxide-simeth, hydrOXYzine HCL, magnesium hydroxide, nicotine polacrilex, OLANZapine **OR** OLANZapine, tropicamide

## 2025-09-07 NOTE — GROUP NOTE
Group Topic: Other   Group Date: 9/7/2025  Start Time: 1000  End Time: 1030  Facilitators: DENVER Mcghee   Department: [facility] REHABTH VIRTUAL    Number of Participants: 11   Group Focus: other Unit Rules/Policies  Treatment Modality: Other: Recreation Therapy  Interventions utilized were clarification and support  Purpose: insight or knowledge    Name: Ellis Pollack YOB: 1960   MR: [id number]      Facilitator: Recreational Therapist  Level of Participation: moderate  Quality of Participation: appropriate/pleasant and cooperative  Interactions with others: gave feedback  Mood/Affect: pt joins group with little encouragement.  Engages with peers and staff and offers information he feels is important but info pt offers is not related to unit rules.  Pleasantly confused.   Triggers (if applicable): n/a  Cognition: confused  Progress: Minimal  Comments: pt problem is delusional  thought.    Plan: continue with services

## 2025-09-08 NOTE — PROGRESS NOTES
ALEXW spoke with Maddison from [facility] who completed onsite visit with pt for their facility, [facility] in [place]. Maddison feels that pt would be appropriate for the facility but is concerned that pt would not pass the LOC being that he does not have a formal dx of dementia. LSW can submit the medicaid application to obtain a pending number and then submit LOC, in order to get pt approved and admitted to facility.     ALEXW spoke with pt's wife about the facility options which are [facility] and [facility] in [place]. Although these facilities are both 30 minutes from her residence she feels that [facility] is too far with the construction taking place. Pt's wife is interested in [facility]. LSW left a meesage with this facility about proceeding with pt's admission to facility and the next steps. LSW will wait for further response.     MARGARITA Grace

## 2025-09-08 NOTE — CARE PLAN
The patient's goals for the shift include listen to music    The clinical goals for the shift include improve cognition    Over the shift, the patient did make progress toward the following goals. Barriers to progression include. Recommendations to address these barriers include.

## 2025-09-08 NOTE — PROGRESS NOTES
LSW received notification that [facility] is likely to accept pt pending onsite this afternoon and Medicaid screening. As well as [facility] in [place] has accepted and waiting for [facility] to respond. Therefore to give the family some choices of facilities to choose from. LSW will update the family after more information from onsite.     MARGARITA GraceW

## 2025-09-08 NOTE — GROUP NOTE
Group Topic: Self-Care/Wellness   Group Date: 9/8/2025  Start Time: 1000  End Time: 1115  Facilitators: BONNIE ColeS   Department: [facility] REHABTH VIRTUAL    Number of Participants: 12   Group Focus: mental, emotional and physical well-being   Treatment Modality: Leisure Development, Education, Recreation Therapy  Interventions utilized were exploration, group exercise, leisure development, mental fitness, patient education, and support  Purpose: In this group session, patients engaged in Q&A focused on?promoting a lifestyle aimed at helping patients increase control over their health and improve quality of life, enhance cognition and stimulate socialization. Topics include healthy sleep, leisure skills, physical activity, nutrition, and stress relief.     Name: Ellis Pollack YOB: 1960   MR: [id number]      Facilitator: Recreational Therapist  Level of Participation: active  Quality of Participation: disruptive and impulsive  Interactions with others: disruptive, inappropriate responses   Mood/Affect: flat  Cognition: confused  Progress: Moderate  Comments: continues to pleasantly confused, although needs some redirection secondary to inappropriate responses and interruptions.   Plan: continue with services

## 2025-09-08 NOTE — CONSULTS
Consults    Reason For Consult  ***    History Of Present Illness  Ellis Pollack is a 64 y.o. male presenting with ***.     Past Medical History  He has no past medical history on file.    Surgical History  He has no past surgical history on file.     Social History  He reports that he has quit smoking. His smoking use included cigarettes. He has never used smokeless tobacco. He reports that he does not drink alcohol and does not use drugs.    Family History  Family History[1]     Allergies  Patient has no known allergies.    Review of Systems    REVIEW OF SYSTEMS  GENERAL:  Negative for malaise, significant weight loss, fever  HEENT:  No changes in hearing or vision, no nose bleeds or other nasal problems and Negative for frequent or significant headaches  NECK:  Negative for lumps, goiter, pain and significant neck swelling  RESPIRATORY:  Negative for cough, wheezing and shortness of breath  CARDIOVASCULAR:  Negative for chest pain, leg swelling and palpitations  GI:  Negative for abdominal discomfort, blood in stools or black stools and change in bowel habits  :  Negative for dysuria, frequency and incontinence  MUSCULOSKELETAL:  Negative for joint pain or swelling, back pain, and muscle pain.  SKIN:  Negative for lesions, rash, and itching  PSYCH:  Negative for sleep disturbance, mood disorder and recent psychosocial stressors  HEMATOLOGY/LYMPHOLOGY:  Negative for prolonged bleeding, bruising easily, and swollen nodes.  ENDOCRINE:  Negative for cold or heat intolerance, polyuria, polydipsia and goiter  NEURO: Negative, denies any burning, tingling or numbness     Objective:   Vasc: DP and PT pulses are palpable bilateral.  CFT is less than 3 seconds bilateral.  Skin temperature is warm to cool proximal to distal bilateral.      Neuro:  Light touch is intact to the foot bilateral.  Protective sensation is intact to the foot when tested with the 5.07 SWM bilateral.  There is no clonus noted.  The hallux is  "downgoing bilateral.      Derm: Nails 1-5 bilateral are intact.  Skin is supple with normal texture and turgor noted.  Webspaces are clean, dry and intact bilateral.  There are no hyperkeratoses, ulcerations, verruca or other lesions noted.      Ortho: Muscle strength is 5/5 for all pedal groups tested.  Ankle joint, subtalar joint, 1st MPJ and lesser MPJ ROM is full and without pain or crepitus.  The foot type is rectus bilateral off weight bearing.  There are no structural deformities noted.       Physical Exam     Last Recorded Vitals  Blood pressure 127/88, pulse 97, temperature 37 °C (98.6 °F), temperature source Temporal, resp. rate 17, height 1.702 m (5' 7\"), weight 65.2 kg (143 lb 11.8 oz), SpO2 96%.         Relevant Results  ***     Assessment/Plan     ***    I spent *** minutes in the professional and overall care of this patient.               [1] No family history on file.    "

## 2025-09-08 NOTE — PROGRESS NOTES
"[facility]: [facility]  PROGRESS NOTE      Ellis Pollack is a 64 y.o. male that is being seen  today for follow up at [facility].  Subjective   Pt. Is being seen for follow up at [facility].  Patient has been stable.  Vital signs are stable.  Patient is a pleasantly confused.  Not able to provide adequate review of system.      ROS  Patient is pleasantly confused and not able to provide adequate review of system.  Vitals:    09/08/25 0813   BP: 109/76   Pulse: 79   Resp:    Temp: 36.1 °C (97 °F)   SpO2: 97%      Vitals:    08/13/25 1900   Weight: 65.2 kg (143 lb 11.8 oz)     Body mass index is 22.51 kg/m².  Physical Exam  Constitutional: Patient does not appear to be in any acute distress  Cardiovascular: RRR, S1/S2, no murmurs, rubs, or gallops, radial pulses +2, no edema of extremities  Pulmonary: CTAB, no respiratory distress.  Abdomen: +BS, soft, non-tender, nondistended, no guarding or rebound, no masses noted  MSK: No joint swelling, normal movements of all extremities. Range of motion- normal.  Skin- No lesions, contusions, or erythema.  Peripheral puslses palpable bilaterally 2+  Neuro: AAO X1-2, Cranial nerves 2-12 grossly intact,DTR 2+ in all 4 limbs       LABS   [unfilled]  Lab Results   Component Value Date    GLUCOSE 111 (H) 09/03/2025    CALCIUM 9.2 09/03/2025     09/03/2025    K 4.6 09/03/2025    CO2 30 09/03/2025     09/03/2025    BUN 23 09/03/2025    CREATININE 0.89 09/03/2025     Lab Results   Component Value Date    ALT 25 09/03/2025    AST 18 09/03/2025    ALKPHOS 46 09/03/2025    BILITOT 0.6 09/03/2025     Lab Results   Component Value Date    WBC 9.3 08/05/2025    HGB 14.8 08/05/2025    HCT 43.2 08/05/2025    MCV 94 08/05/2025     08/05/2025     No results found for: \"CHOL\"  No results found for: \"HDL\"  No results found for: \"LDLCALC\"  No results found for: \"TRIG\"  Lab Results   Component Value Date    HGBA1C 5.4 11/22/2021     Other labs not included " in the list above were reviewed either before or during this encounter.    History    Medical History[1]  Surgical History[2]  Family History[3]  Allergies[4]  Medications Ordered Prior to Encounter[5]  Immunization History   Administered Date(s) Administered    Flu vaccine, trivalent, preservative free, HIGH-DOSE, age 65y+ (Fluzone) 09/02/2025    Moderna SARS-CoV-2 Vaccination 04/10/2021, 05/08/2021, 12/28/2021    Pfizer COVID-19 vaccine, 12 years and older, (30mcg/0.3mL) (Comirnaty) 12/06/2023, 09/29/2024    Pfizer COVID-19 vaccine, bivalent, age 12 years and older (30 mcg/0.3 mL) 12/20/2022    Tdap vaccine, age 7 year and older (BOOSTRIX, ADACEL) 02/22/2024     Patient's medical history was reviewed and updated either before or during this encounter.  ASSESSMENT / PLAN:  Active Hospital Problems    Major neurocognitive disorder (Multi)      Seizure disorder (Multi)      *Atypical psychosis       Patient is being seen for follow-up at [facility].  Patient has seizure disorder and is on Keppra.  Patient's vital signs are stable.  Patient is being seen by [facility] team for atypical psychosis.  Patient is pleasantly confused not able to provide adequate review of systems.      Pierce Murray MD                  [1] History reviewed. No pertinent past medical history.  [2] History reviewed. No pertinent surgical history.  [3] No family history on file.  [4] No Known Allergies  [5]   No current facility-administered medications on file prior to encounter.     Current Outpatient Medications on File Prior to Encounter   Medication Sig Dispense Refill    calcium carbonate 500 mg calcium (1,250 mg) chewable tablet Chew and swallow 1 tablet (500 mg of elemental calcium) once daily.      levETIRAcetam (Keppra) 500 mg tablet Take 1 tablet (500 mg) by mouth 2 times a day. 60 tablet 11    multivitamin tablet Take 1 tablet by mouth once daily.

## 2025-09-08 NOTE — GROUP NOTE
Group Topic: Goals   Group Date: 9/7/2025  Start Time: 2010  End Time: 2025  Facilitators: Meseret Yang   Department: [facility]     Number of Participants: 15   Group Focus: check in  Treatment Modality: Interpersonal Therapy  Interventions utilized were reality testing and reminiscence  Purpose: self-care    Name: Ellis Pollack YOB: 1960   MR: [id number]      Facilitator: [facility]  Level of Participation: minimal  Quality of Participation: oppositional, pushed limits, redirectable, and uncooperative  Interactions with others: intrusive  Mood/Affect: agitated, angry, blunted, incongruent, and irritable  Triggers (if applicable): none  Cognition: confused, loose, not focused, and processing slowly  Progress: Minimal  Comments: patient presented with atypical psychosis  Plan: continue with services

## 2025-09-08 NOTE — CARE PLAN
The patient's goals for the shift include listen to music    The clinical goals for the shift include maintain safety/rest      Problem: Fall/Injury  Goal: Not fall by end of shift  Outcome: Progressing     Problem: Discharge Planning  Goal: Discharge to home or other facility with appropriate resources  Outcome: Progressing     Problem: Chronic Conditions and Co-morbidities  Goal: Patient's chronic conditions and co-morbidity symptoms are monitored and maintained or improved  Outcome: Progressing     Problem: Nutrition  Goal: Nutrient intake appropriate for maintaining nutritional needs  Outcome: Progressing

## 2025-09-08 NOTE — GROUP NOTE
Group Topic: Goals   Group Date: 9/8/2025  Start Time: 0730  End Time: 0800  Facilitators: Alfonso Glover   Department: [facility] Geriatric     Number of Participants: 9   Group Focus: goals  Treatment Modality: Patient-Centered Therapy  Interventions utilized were assignment  Purpose: self-care    Name: Ellis Pollack YOB: 1960   MR: [id number]      Facilitator: [facility]  Level of Participation: did not attend  Quality of Participation: did not attend  Interactions with others: did not attend  Mood/Affect: did not attend  Triggers (if applicable): did not attend  Cognition: did not attend  Progress: None  Comments: did not attend  Plan: continue with services

## 2025-09-08 NOTE — PROGRESS NOTES
"Ellis Pollack is a 64 y.o. male on day 34 of admission presenting with Atypical psychosis.      Subjective   Chart was reviewed and the case was discussed with the interdisciplinary team.   Staff reported no major concerns aside from the patient refusing the scheduled dose of risperidone and experiencing somewhat disrupted sleep overnight. No abnormal involuntary movements, drooling, or extrapyramidal symptoms were observed.  During today’s face-to-face encounter, the patient appeared calm, cooperative, and without overt distress. He demonstrated partial insight by recalling meeting with this provider previously; however, when further questioned, she was unable to provide coherent or appropriately organized responses, reflecting persistent disorganization of thought processes. Affect remained neutral and congruent with stated mood.  The patient denied auditory or visual hallucinations, denied suicidal ideation, and denied homicidal ideation. He further denied depressive symptoms and did not endorse significant anxiety at the time of interview. Overall, she presents as psychiatrically stable, though disorganized, with limited insight into his condition.        Objective     Last Recorded Vitals  Blood pressure 109/76, pulse 79, temperature 36.1 °C (97 °F), temperature source Temporal, resp. rate 17, height 1.702 m (5' 7\"), weight 65.2 kg (143 lb 11.8 oz), SpO2 97%.         Sleep Log  Estimated \"Sleeping\" Durations   Night Est. Hours   08/24 7.00-8.25   08/25 7.75-8.75   08/26 9.00-9.25   08/27 7.75-8.50   08/28 7.25-7.75   08/29 8.50   08/30 9.00   08/31 8.25-8.75   09/01 9.50-10.00   09/02 6.00-6.75   09/03 7.75-8.25   09/04 9.50-10.25   09/05 6.00-7.75   09/06 8.00-8.50   09/07 8.75-9.00        Review of Systems    Psychiatric ROS - Adult  Anxiety: Denies current symptoms  Depression: denies current symptoms  Delirium: Negative  Psychosis: Denies AVH.  Elizabeth: Negative  Safety Issues: when asked, he denies SI, " "denies HI  Psychiatric ROS Comment: - as noted    Physical Exam      Mental Status Exam  General: appears older than stated age, disheveled  Appearance: wearing hospital attire   Attitude: calm, confused  Behavior: disorganized but cooperative  Motor Activity: No abnormal movements noted  Speech: reduced rate and volume, poorly enunciated at times   Mood: \"ok\"  Affect: constricted, confused at times  Thought Process:  disorganized   Thought Content: delusions, probable paranoia, when asked he denies SI/HI  Thought Perception: when asked he denies AH/VH  Cognition: alert and oriented to self, place, time, and president  Insight: possible baseline of limited - short term memory deficits, does not recall why he is admitted, extent of his illness  Judgement: possible baseline of poor to fair, patient is intermittently taking medications, nonagitated       Psychiatric Risk Assessment  Violence Risk Assessment: major mental illness, male, substance abuse, and other possible developing dementia  Acute Risk of Harm to Others is Considered: low  Suicide Risk Assessment: age > 65 yrs old, , and male  Protective Factors against Suicide: other denies SI/HI, denies past SA  Acute Risk of Harm to Self is Considered: low    Relevant Results  Results for orders placed or performed during the hospital encounter of 08/05/25 (from the past 96 hours)   Urinalysis with Reflex Culture   Result Value Ref Range    Color, Urine Yellow Light-Yellow, Yellow, Dark-Yellow    Appearance, Urine Clear Clear    Specific Gravity, Urine 1.021 1.005 - 1.035    pH, Urine 7.0 5.0, 5.5, 6.0, 6.5, 7.0, 7.5, 8.0    Protein, Urine NEGATIVE NEGATIVE, 10 (TRACE), 20 (TRACE) mg/dL    Glucose, Urine Normal Normal mg/dL    Blood, Urine NEGATIVE NEGATIVE mg/dL    Ketones, Urine TRACE (A) NEGATIVE mg/dL    Bilirubin, Urine NEGATIVE NEGATIVE mg/dL    Urobilinogen, Urine Normal Normal mg/dL    Nitrite, Urine NEGATIVE NEGATIVE    Leukocyte Esterase, Urine " NEGATIVE NEGATIVE     Scheduled medications  Scheduled Medications[1]  Continuous medications  Continuous Medications[2]  PRN medications  PRN Medications[3]       Assessment & Plan  Atypical psychosis    Seizure disorder (Multi)    Major neurocognitive disorder (Multi)    Ellis Pollack is a 64 year old M with past psychiatric history of anxiety, psychosis, admitted to [facility] on 8/5/25 with psychotic symptoms.       Improvement in irritability and delusions, persisting with some thought disorganization, seemingly purposeless wandering behaviors.  Medication adherence fluctuating however improving.  Cognitive screening assessment likely indicative of major neurocognitive disorder. Admission continues, pending optimal disposition to the community.     Biological  - continue risperidone 1mg BID for psychosis/agitation/anxiety educated and encouraged to take the bedtime dose   - continue Depakote ER 750mg for mood stabilization     Valproic acid level 66 (09/03/25)  - prns available  - c/w daily thiamine and MVI  - continue intraoral tropicamide for sialorrhea of risperdal 1% 1 drop bid PRN swish and spit  - melatonin 3mg at 1800 for sleep/wake cycle regulation  - scheduled trazodone 25mg bedtime for sleep aide  - medical co-managing pertinences     Psychological  - Provider encourages patient to attend all groups.     Sociological  - Provider encourages patient to work with social work regarding safe discharge plan.          Nutrition/Malnutrition:  The diagnosis of malnutrition has significant impact on risk adjustment, mortality and readmission rates, length of stay and hospital reimbursement.  The below is a representation of nutrition status if evaluated.  If blank, there is no associated malnutrition finding to incorporate per the dietician team:         Total time spent with patient encounter 27 minutes. This includes patient interview, assessment, extensive chart review, personal review of labs and  images as noted above, and formulation of recommendations.     Rae Benitez PMHNP-BC  Psychiatry, [facility]  1* contact: Epic chat preferred               [1] calcium carbonate, 1,250 mg of calcium carbonate, oral, Daily  divalproex, 750 mg, oral, Daily  melatonin, 3 mg, oral, Daily  multivitamin with minerals, 1 tablet, oral, Daily  risperiDONE, 1 mg, oral, BID  thiamine, 100 mg, oral, Daily  traZODone, 25 mg, oral, Nightly     [2]    [3] PRN medications: acetaminophen, alum-mag hydroxide-simeth, hydrOXYzine HCL, magnesium hydroxide, nicotine polacrilex, OLANZapine **OR** OLANZapine, tropicamide

## 2025-09-09 NOTE — GROUP NOTE
Group Topic: Goals   Group Date: 9/9/2025  Start Time: 0730  End Time: 0800  [facility]   Department: [facility] Geriatric     Number of Participants: 17   Group Focus: goals  Treatment Modality: Individual Therapy  Interventions utilized were assignment  Purpose: coping skills, feelings, insight or knowledge, and self-care    Name: Ellis Pollack YOB: 1960   MR: [id number]      Facilitator: [facility]  Level of Participation: minimal  Quality of Participation: appropriate/pleasant  Interactions with others: appropriate  Mood/Affect: appropriate  Cognition: coherent/clear  Progress: Moderate  Comments: patient was able to make progress towards treatment of atypical psychosis  Plan: continue with services

## 2025-09-09 NOTE — PROGRESS NOTES
"Ellis Pollack is a 64 y.o. male on day 35 of admission presenting with Atypical psychosis.      Subjective   Chart was reviewed and the case was discussed with the interdisciplinary team.   Staff reported no major concerns aside from the patient refusing the scheduled dose of risperidone this AM.  During today’s face-to-face encounter, the patient did not demonstrate any major changes in clinical presentation. He reported experiencing a vivid nightmare in which his wife was shouting at him for smoking a cigarette, describing that it felt very real and left him upset upon awakening.  The patient currently denies auditory or visual hallucinations. He also denies suicidal ideation, and no homicidal ideation. He remains disorganized in thought process but is in no acute distress (NAD) at this time.  The patient verbalized plans to call his wife later today and expressed confusion about the necessity of his continued hospitalization, stating he does not understand why he is still here. When offered the opportunity to ask questions or voice additional concerns, he declined.       Objective     Last Recorded Vitals  Blood pressure 111/74, pulse 81, temperature 37.1 °C (98.8 °F), temperature source Temporal, resp. rate 17, height 1.702 m (5' 7\"), weight 65.2 kg (143 lb 11.8 oz), SpO2 100%.         Sleep Log  Estimated \"Sleeping\" Durations   Night Est. Hours   08/25 7.75-8.75   08/26 9.00-9.25   08/27 7.75-8.50   08/28 7.25-7.75   08/29 8.50   08/30 9.00   08/31 8.25-8.75   09/01 9.50-10.00   09/02 6.00-6.75   09/03 7.75-8.25   09/04 9.50-10.25   09/05 6.00-7.75   09/06 8.00-8.50   09/07 8.75-9.00   09/08 7.75-8.00        Review of Systems    Psychiatric ROS - Adult  Anxiety: Denies current symptoms  Depression: denies current symptoms  Delirium: Negative  Psychosis: Denies AVH.  Elizabeth: Negative  Safety Issues: when asked, he denies SI, denies HI  Psychiatric ROS Comment: - as noted    Physical Exam      Mental Status " "Exam  General: appears older than stated age, disheveled  Appearance: wearing hospital attire   Attitude: calm, confused  Behavior: disorganized but cooperative  Motor Activity: No abnormal movements noted  Speech: reduced rate and volume, poorly enunciated at times   Mood: \"I had a nightmare\"  Affect: constricted, confused at times  Thought Process:  disorganized   Thought Content: delusions, probable paranoia, when asked he denies SI/HI  Thought Perception: when asked he denies AH/VH  Cognition: alert and oriented to self, place, time, and president  Insight: possible baseline of limited - short term memory deficits, does not recall why he is admitted, extent of his illness  Judgement: possible baseline of poor to fair, patient is intermittently taking medications, nonagitated       Psychiatric Risk Assessment  Violence Risk Assessment: major mental illness, male, substance abuse, and other possible developing dementia  Acute Risk of Harm to Others is Considered: low  Suicide Risk Assessment: age > 65 yrs old, , and male  Protective Factors against Suicide: other denies SI/HI, denies past SA  Acute Risk of Harm to Self is Considered: low    Relevant Results  Results for orders placed or performed during the hospital encounter of 08/05/25 (from the past 96 hours)   Urinalysis with Reflex Culture   Result Value Ref Range    Color, Urine Yellow Light-Yellow, Yellow, Dark-Yellow    Appearance, Urine Clear Clear    Specific Gravity, Urine 1.021 1.005 - 1.035    pH, Urine 7.0 5.0, 5.5, 6.0, 6.5, 7.0, 7.5, 8.0    Protein, Urine NEGATIVE NEGATIVE, 10 (TRACE), 20 (TRACE) mg/dL    Glucose, Urine Normal Normal mg/dL    Blood, Urine NEGATIVE NEGATIVE mg/dL    Ketones, Urine TRACE (A) NEGATIVE mg/dL    Bilirubin, Urine NEGATIVE NEGATIVE mg/dL    Urobilinogen, Urine Normal Normal mg/dL    Nitrite, Urine NEGATIVE NEGATIVE    Leukocyte Esterase, Urine NEGATIVE NEGATIVE     Scheduled medications  Scheduled " Medications[1]  Continuous medications  Continuous Medications[2]  PRN medications  PRN Medications[3]       Assessment & Plan  Atypical psychosis    Seizure disorder (Multi)    Major neurocognitive disorder (Multi)    Ellis Pollack is a 64 year old M with past psychiatric history of anxiety, psychosis, admitted to [facility] on 8/5/25 with psychotic symptoms.       Improvement in irritability and delusions, persisting with some thought disorganization, seemingly purposeless wandering behaviors.  Medication adherence fluctuating however improving.  Cognitive screening assessment likely indicative of major neurocognitive disorder. Admission continues, pending optimal disposition to the community.     Biological  - continue risperidone 1mg BID for psychosis/agitation/anxiety educated and encouraged to take the bedtime dose   - continue Depakote ER 750mg for mood stabilization     Valproic acid level 66 (09/03/25)  - prns available  - c/w daily thiamine and MVI  - continue intraoral tropicamide for sialorrhea of risperdal 1% 1 drop bid PRN swish and spit  - melatonin 3mg at 1800 for sleep/wake cycle regulation  - scheduled trazodone 25mg bedtime for sleep aide  - medical co-managing pertinences     Psychological  - Provider encourages patient to attend all groups.     Sociological  - Provider encourages patient to work with social work regarding safe discharge plan.          Nutrition/Malnutrition:  The diagnosis of malnutrition has significant impact on risk adjustment, mortality and readmission rates, length of stay and hospital reimbursement.  The below is a representation of nutrition status if evaluated.  If blank, there is no associated malnutrition finding to incorporate per the dietician team:         Total time spent with patient encounter 25 minutes. This includes patient interview, assessment, extensive chart review, personal review of labs and images as noted above, and formulation of recommendations.      Rae Benitez PMHNP-BC  Psychiatry, [facility]  1* contact: Epic chat preferred                 [1] calcium carbonate, 1,250 mg of calcium carbonate, oral, Daily  divalproex, 750 mg, oral, Daily  melatonin, 3 mg, oral, Daily  multivitamin with minerals, 1 tablet, oral, Daily  risperiDONE, 1 mg, oral, BID  thiamine, 100 mg, oral, Daily  traZODone, 25 mg, oral, Nightly     [2]    [3] PRN medications: acetaminophen, alum-mag hydroxide-simeth, hydrOXYzine HCL, magnesium hydroxide, nicotine polacrilex, OLANZapine **OR** OLANZapine, tropicamide

## 2025-09-09 NOTE — CARE PLAN
The patient's goals for the shift include listen to music    The clinical goals for the shift include maintain safety    Over the shift, the patient did make progress toward the following goals.

## 2025-09-09 NOTE — GROUP NOTE
Group Topic: Other   Group Date: 9/9/2025  Start Time: 1330  End Time: 1430  Facilitators: DENVER Mcghee   Department: [facility]    Number of Participants: 10   Group Focus: other Mind Joggers  Treatment Modality: Other: Recreation Therapy  Interventions utilized were mental fitness  Purpose: other: fun, elevate mood, increase socialization, stimulate memory, enhance self esteem    Name: Ellis Pollack YOB: 1960   MR: [id number]      Facilitator: Recreational Therapist  Level of Participation: did not attend  Quality of Participation: did not attend  Interactions with others: did not attend  Mood/Affect: did not attend  Triggers (if applicable): n/a  Cognition: did not attend  Progress: None  Comments: pt problem is delusional thought.  Pt is wandering around the unit during group.  Focused on writing and his folder.  Pleasantly confused.  No handout to offer.  Plan: continue with services

## 2025-09-09 NOTE — NURSING NOTE
ALLY NOTE     Problem:  Delusions     Behavior:  Patient was quiet, calm and cooperative. He was in bed sleeping in the beginning of the shift. He woke up later in the evening, had snack then went back to bed. No complaints made or issues to note. He did not question his medications this evening.      Group Participation: No    Appetite/Meals: HS snacks provided       Interventions:  Administered scheduled medications    Response:  Compliant with care and medications     Plan:  Continue current plan of care

## 2025-09-09 NOTE — CARE PLAN
The patient's goals for the shift include listen to music    The clinical goals for the shift include maintain safety    Over the shift, the patient did make progress toward the following goals. Barriers to progression include wear yellow safety socks. Recommendations to address these barriers include maintain safety.

## 2025-09-09 NOTE — GROUP NOTE
Group Topic: Personal Responsibility   Group Date: 9/9/2025  Start Time: 1110  End Time: 1200  Facilitators: DENVER Mcghee   Department: [facility]    Number of Participants: 13   Group Focus: other Accepting Personal Responsibility  Treatment Modality: Other: Recreation Therapy  Interventions utilized were exploration, group exercise, patient education, problem solving, and support  Purpose: coping skills, maladaptive thinking, feelings, irrational fears, communication skills, insight or knowledge, self-worth, self-care, relapse prevention strategies, and trigger / craving management    Name: Ellis Pollack YOB: 1960   MR: [id number]      Facilitator: Recreational Therapist  Level of Participation: minimal  Quality of Participation: passive and quiet  Interactions with others: pt joins group with little encouragement.  Is quiet during and displays passive participation.   Mood/Affect: flat  Triggers (if applicable): n/a  Cognition: not focused  Progress: Minimal  Comments: pt problem is delusional thought.    Plan: continue with services

## 2025-09-09 NOTE — GROUP NOTE
Group Topic: Medication Education   Group Date: 9/9/2025  Start Time: 1000  End Time: 1100  Facilitators: Ena Stockton PharmD   Department: [facility]    Number of Participants: 12   Group Focus: goals and other general medication education   Treatment Modality: Patient-Centered Therapy and Skills Training  Interventions utilized were group exercise, other [employer] Game, and patient education  Purpose: insight or knowledge and other: improved medication compliance    Name: Ellis Pollack YOB: 1960   MR: [id number]      Facilitator: Pharmacist  Level of Participation: did not participate but did join the group with 10 minutes remaining.   Quality of Participation: unable to access based on time spent in group   Interactions with others:  unable to access based on time spent in group   Mood/Affect:  unable to access based on time spent in group   Triggers (if applicable): n/a  Cognition:  unable to access based on time spent in group   Progress: None  Comments: Ellis Pollack attended the last 10 minutes of the general medication education group today. We played Beezag.  We went around the the room choosing categories and Ellis Pollack did not participate in the game.  Ellis Pollack was side talking with other patients in the short time he was in attendance of group.     Plan: continue with services

## 2025-09-10 NOTE — GROUP NOTE
Group Topic: Reflection   Group Date: 9/10/2025  Start Time: 1000  End Time: 1050  Facilitators: DENVER Mcghee   Department: [facility] REHAB[facility] VIRTUAL    Number of Participants: 13   Group Focus: other Values Discussion  Treatment Modality: Other: Recreation Therapy  Interventions utilized were clarification, exploration, reminiscence, and support  Purpose: coping skills, maladaptive thinking, feelings, irrational fears, communication skills, insight or knowledge, self-worth, self-care, relapse prevention strategies, and trigger / craving management    Name: Ellis Pollack YOB: 1960   MR: [id number]      Facilitator: Recreational Therapist  Level of Participation: minimal  Quality of Participation: passive and quiet  Interactions with others: pt joins group with little encouragement.  Does makes some pleasantly confused comments but not very focused on group topic.  Mood/Affect: flat  Triggers (if applicable): n/a  Cognition: confused  Progress: Minimal  Comments: pt problem is delusional thought.   Plan: continue with services

## 2025-09-10 NOTE — GROUP NOTE
Group Topic: Goals   Group Date: 9/10/2025  Start Time: 0730  End Time: 0800  Facilitators: Will Thomas   Department: [facility]     Number of Participants: 19   Group Focus: goals  Treatment Modality: Individual Therapy  Interventions utilized were assignment  Purpose: feelings, insight or knowledge, and self-care    Name: Ellis Pollack YOB: 1960   MR: [id number]      Facilitator: Mental Health PCNA  Level of Participation: minimal  Quality of Participation: appropriate/pleasant  Interactions with others: appropriate  Mood/Affect: appropriate  Cognition: coherent/clear  Progress: Minimal  Comments: patient was able to make some progress towards treatment of atypical psychosis  Plan: continue with services and patient will be encouraged to participate

## 2025-09-10 NOTE — NURSING NOTE
"ALLY NOTE     Problem:  Delusions     Behavior:  Patient was quiet and calm during this shift. He refused his night medications. Stating that he does not need sleep medication and \"follow up medication\" Patient states that the medication he is prescribed causes him to have \"terrible nightmare\"    Group Participation: Yes    Appetite/Meals: HS snacks provided       Interventions:  Every 15 minute checks for safety    Response:  Complaint with care   Maintained safety     Plan:  Continue current plan of care   "

## 2025-09-10 NOTE — PROGRESS NOTES
"[facility]: [facility]  PROGRESS NOTE      Ellis Pollack is a 64 y.o. male that is being seen  today for follow up at [facility].  Subjective   Pt. Is being seen for follow up at [facility].  Patient has been stable.  Vital signs are stable.  Patient is a pleasantly confused.  Not able to provide adequate review of system.      ROS  Patient is pleasantly confused and not able to provide adequate review of system.  Vitals:    09/10/25 0700   BP: 114/80   Pulse: 77   Resp: 16   Temp: 36.4 °C (97.5 °F)   SpO2: 97%      Vitals:    08/13/25 1900   Weight: 65.2 kg (143 lb 11.8 oz)     Body mass index is 22.51 kg/m².  Physical Exam  Constitutional: Patient does not appear to be in any acute distress  Cardiovascular: RRR, S1/S2, no murmurs, rubs, or gallops, radial pulses +2, no edema of extremities  Pulmonary: CTAB, no respiratory distress.  Abdomen: +BS, soft, non-tender, nondistended, no guarding or rebound, no masses noted  MSK: No joint swelling, normal movements of all extremities. Range of motion- normal.  Skin- No lesions, contusions, or erythema.  Peripheral puslses palpable bilaterally 2+  Neuro: AAO X1-2, Cranial nerves 2-12 grossly intact,DTR 2+ in all 4 limbs       LABS   [unfilled]  Lab Results   Component Value Date    GLUCOSE 111 (H) 09/03/2025    CALCIUM 9.2 09/03/2025     09/03/2025    K 4.6 09/03/2025    CO2 30 09/03/2025     09/03/2025    BUN 23 09/03/2025    CREATININE 0.89 09/03/2025     Lab Results   Component Value Date    ALT 25 09/03/2025    AST 18 09/03/2025    ALKPHOS 46 09/03/2025    BILITOT 0.6 09/03/2025     Lab Results   Component Value Date    WBC 9.3 08/05/2025    HGB 14.8 08/05/2025    HCT 43.2 08/05/2025    MCV 94 08/05/2025     08/05/2025     No results found for: \"CHOL\"  No results found for: \"HDL\"  No results found for: \"LDLCALC\"  No results found for: \"TRIG\"  Lab Results   Component Value Date    HGBA1C 5.4 11/22/2021     Other labs not " included in the list above were reviewed either before or during this encounter.    History    Medical History[1]  Surgical History[2]  Family History[3]  Allergies[4]  Medications Ordered Prior to Encounter[5]  Immunization History   Administered Date(s) Administered    Flu vaccine, trivalent, preservative free, HIGH-DOSE, age 65y+ (Fluzone) 09/02/2025    Moderna SARS-CoV-2 Vaccination 04/10/2021, 05/08/2021, 12/28/2021    Pfizer COVID-19 vaccine, 12 years and older, (30mcg/0.3mL) (Comirnaty) 12/06/2023, 09/29/2024    Pfizer COVID-19 vaccine, bivalent, age 12 years and older (30 mcg/0.3 mL) 12/20/2022    Tdap vaccine, age 7 year and older (BOOSTRIX, ADACEL) 02/22/2024     Patient's medical history was reviewed and updated either before or during this encounter.  ASSESSMENT / PLAN:  Active Hospital Problems    Major neurocognitive disorder (Multi)      Seizure disorder (Multi)      *Atypical psychosis       Patient is being seen for follow-up at [facility].  Patient has seizure disorder and is on Keppra.  Patient's vital signs are stable.  Patient is being seen by [facility] team for atypical psychosis.  Patient is pleasantly confused not able to provide adequate review of systems.      Pierce Murray MD                    [1] History reviewed. No pertinent past medical history.  [2] History reviewed. No pertinent surgical history.  [3] No family history on file.  [4] No Known Allergies  [5]   No current facility-administered medications on file prior to encounter.     Current Outpatient Medications on File Prior to Encounter   Medication Sig Dispense Refill    calcium carbonate 500 mg calcium (1,250 mg) chewable tablet Chew and swallow 1 tablet (500 mg of elemental calcium) once daily.      levETIRAcetam (Keppra) 500 mg tablet Take 1 tablet (500 mg) by mouth 2 times a day. 60 tablet 11    multivitamin tablet Take 1 tablet by mouth once daily.

## 2025-09-10 NOTE — PROGRESS NOTES
"Ellis Pollack is a 64 y.o. male on day 36 of admission presenting with Atypical psychosis.      Subjective     The patient was reviewed in interdisciplinary team rounds, with no acute behavioral or concerns reported.  He remains psychiatrically stable with persistent disorganization and cognitive impairment but continues to present as pleasant and cooperative. During 1:1 recreational therapy session yesterday, he demonstrated preserved remote memory by accurately recalling the names of songs and artists from the 1970s, though he  remains confused regarding his current situation and the rationale for continued hospitalization. Affect appeared at baseline in the context of  engagement, though his baseline thought process remains tangential and disorganized. He denied suicidal ideation, homicidal ideation, and both auditory and visual hallucinations. The patient continues to express a strong desire for discharge, he does so without evidence of acute risk, and is redirectable with explanations given. Nursing reports full consumption of meals, appropriate ambulation in the hallways without distress, makes needs known and RN reports of refusal of his morning meds, which the patient himself denied. Psychoeducation was provided regarding the importance of adherence to pharmacotherapy and the patient was encouraged to take his meds and continue engaging in recreational therapy. Overall, he remains pleasantly disorganized with poor insight and judgment, but without evidence of imminent danger to self or others, and discharge planning is ongoing.    Objective     Last Recorded Vitals  Blood pressure 114/80, pulse 77, temperature 36.4 °C (97.5 °F), temperature source Temporal, resp. rate 16, height 1.702 m (5' 7\"), weight 65.2 kg (143 lb 11.8 oz), SpO2 97%.         Sleep Log  Estimated \"Sleeping\" Durations   Night Est. Hours   08/26 9.00-9.25   08/27 7.75-8.50   08/28 7.25-7.75   08/29 8.50   08/30 9.00   08/31 8.25-8.75 " "  09/01 9.50-10.00   09/02 6.00-6.75   09/03 7.75-8.25   09/04 9.50-10.25   09/05 6.00-7.75   09/06 8.00-8.50   09/07 8.75-9.00   09/08 7.75-8.00   09/09 9.75-10.00        Review of Systems    Psychiatric ROS - Adult  Anxiety: Denies current symptoms  Depression: denies current symptoms  Delirium: Negative  Psychosis: Denies AVH.  Elizabeth: Negative  Safety Issues: when asked, he denies SI, denies HI  Psychiatric ROS Comment: - as noted    Physical Exam      Mental Status Exam  General: appears older than stated age, disheveled  Appearance: wearing hospital attire   Attitude: calm, confused  Behavior: disorganized but cooperative  Motor Activity: No abnormal movements noted  Speech: reduced rate and volume, poorly enunciated at times   Mood: \"ok\"  Affect: constricted, confused at times  Thought Process:  disorganized   Thought Content: delusions, probable paranoia, when asked he denies SI/HI  Thought Perception: when asked he denies AH/VH  Cognition: alert and oriented to self, place, time, and president  Insight: possible baseline of limited - short term memory deficits, does not recall why he is admitted, extent of his illness  Judgement: possible baseline of poor to fair, patient is intermittently taking medications, nonagitated       Psychiatric Risk Assessment  Violence Risk Assessment: major mental illness, male, substance abuse, and other possible developing dementia  Acute Risk of Harm to Others is Considered: low  Suicide Risk Assessment: age > 65 yrs old, , and male  Protective Factors against Suicide: other denies SI/HI, denies past SA  Acute Risk of Harm to Self is Considered: low    Relevant Results  Results for orders placed or performed during the hospital encounter of 08/05/25 (from the past 96 hours)   Urinalysis with Reflex Culture   Result Value Ref Range    Color, Urine Yellow Light-Yellow, Yellow, Dark-Yellow    Appearance, Urine Clear Clear    Specific Gravity, Urine 1.021 1.005 - 1.035 "    pH, Urine 7.0 5.0, 5.5, 6.0, 6.5, 7.0, 7.5, 8.0    Protein, Urine NEGATIVE NEGATIVE, 10 (TRACE), 20 (TRACE) mg/dL    Glucose, Urine Normal Normal mg/dL    Blood, Urine NEGATIVE NEGATIVE mg/dL    Ketones, Urine TRACE (A) NEGATIVE mg/dL    Bilirubin, Urine NEGATIVE NEGATIVE mg/dL    Urobilinogen, Urine Normal Normal mg/dL    Nitrite, Urine NEGATIVE NEGATIVE    Leukocyte Esterase, Urine NEGATIVE NEGATIVE     Scheduled medications  Scheduled Medications[1]  Continuous medications  Continuous Medications[2]  PRN medications  PRN Medications[3]       Assessment & Plan  Atypical psychosis    Seizure disorder (Multi)    Major neurocognitive disorder (Multi)    Ellis Pollack is a 64 year old M with past psychiatric history of anxiety, psychosis, admitted to [facility] on 8/5/25 with psychotic symptoms.       Improvement in irritability and delusions, persisting with some thought disorganization, seemingly purposeless wandering behaviors.  Medication adherence fluctuating however improving.  Cognitive screening assessment likely indicative of major neurocognitive disorder. Admission continues, pending optimal disposition to the community.     Biological  - continue risperidone 1mg BID for psychosis/agitation/anxiety educated and encouraged to take the bedtime dose   - continue Depakote ER 750mg for mood stabilization     Valproic acid level 66 (09/03/25)  - prns available  - c/w daily thiamine and MVI  - continue intraoral tropicamide for sialorrhea of risperdal 1% 1 drop bid PRN swish and spit  - melatonin 3mg at 1800 for sleep/wake cycle regulation  - scheduled trazodone 25mg bedtime for sleep aide  - medical co-managing pertinences     Psychological  - Provider encourages patient to attend all groups.     Sociological  - Provider encourages patient to work with social work regarding safe discharge plan.          Nutrition/Malnutrition:  The diagnosis of malnutrition has significant impact on risk adjustment,  mortality and readmission rates, length of stay and hospital reimbursement.  The below is a representation of nutrition status if evaluated.  If blank, there is no associated malnutrition finding to incorporate per the dietician team:         Total time spent with patient encounter 27 minutes. This includes patient interview, assessment, extensive chart review, personal review of labs and images as noted above, and formulation of recommendations.     Rae Benitez PMP-BC  Psychiatry, [facility]  1* contact: Epic chat preferred                   [1] calcium carbonate, 1,250 mg of calcium carbonate, oral, Daily  divalproex, 750 mg, oral, Daily  melatonin, 3 mg, oral, Daily  multivitamin with minerals, 1 tablet, oral, Daily  risperiDONE, 1 mg, oral, BID  thiamine, 100 mg, oral, Daily  traZODone, 25 mg, oral, Nightly     [2]    [3] PRN medications: acetaminophen, alum-mag hydroxide-simeth, hydrOXYzine HCL, magnesium hydroxide, nicotine polacrilex, OLANZapine **OR** OLANZapine, tropicamide

## 2025-09-10 NOTE — GROUP NOTE
Group Topic: Reminiscence   Group Date: 9/10/2025  Start Time: 1330  End Time: 1430  Facilitators: DENVER Mcghee   Department: [facility] REH[facility]TH VIRTUAL    Number of Participants: 13   Group Focus: other Let's Talk  Treatment Modality: Other: Recreation Therapy  Interventions utilized were exploration, reminiscence, and story telling  Purpose: coping skills, feelings, irrational fears, insight or knowledge, self-care, relapse prevention strategies, and trigger / craving management    Name: Ellis Pollack YOB: 1960   MR: [id number]      Facilitator: Recreational Therapist  Level of Participation: minimal  Quality of Participation: pt joins group with some encouragement.   Is somewhat engaged in group but comments/questions are not related to any questions asked.  Suspicious and unable to understand secondary to confusion level.  Cooperative with behaviors, no agitation noted and is able to be redirected.  Interactions with others: gave feedback  Mood/Affect: pleasantly confused  Triggers (if applicable): n/a  Cognition: confused  Progress: Minimal  Comments: pt problem is delusional thought.   Plan: continue with services

## 2025-09-10 NOTE — GROUP NOTE
Group Topic: Music Therapy   Group Date: 9/10/2025  Start Time: 1105  End Time: 1200  Facilitators: Vesna Hennessy   Department: [facility]    Number of Participants: 15   Group Focus: communication/socialization, concentration, and personal responsibility  Treatment Modality: Music Therapy  Interventions Utilized were: active music engagement, education/instruction, sharing/discussion, and songwriting/composition    Pts were invited to introduce themselves and share if they had any musical background. They then collaborated in a songwriting intervention and then therapeutic instrument instruction/ group music making.   Name: Ellis Pollack YOB: 1960   MR: [id number]      Level of Participation: active  Quality of Participation: appropriate/pleasant and cooperative  Interactions with others: appropriate  Mood/Affect: brightens with interaction and flat  Cognition, Pre Treatment: attentive  Cognition, Post Treatment: attentive  Progress: Moderate  Plan: continue with services

## 2025-09-10 NOTE — GROUP NOTE
Group Topic: Reminiscence   Group Date: 9/9/2025  Start Time: 2000  End Time: 2013  Facilitators: Karli Edward   Department: [facility]    Number of Participants: 14   Group Focus: reminiscence  Treatment Modality: Other: PCA  Interventions utilized were reminiscence  Purpose: feelings and communication skills    Name: Ellis Pollack YOB: 1960   MR: [id number]      Facilitator: Mental Health PCNA  Level of Participation: active  Quality of Participation: appropriate/pleasant, attentive, and cooperative  Interactions with others: appropriate, gave feedback, and supportive  Mood/Affect: positive  Triggers (if applicable): N/A  Cognition: coherent/clear  Progress: Moderate  Comments: Pt problem is atypical psychosis.   Plan: continue with services

## 2025-09-10 NOTE — PROGRESS NOTES
"LSW received notification that pt's wife will tour [facility] in [place] on Friday, as this is her first choice for placement for pt.     [id number]  LSW spoke with pt's daughter Hilary [phone] to provide update on placement. Hilary states that [facility] is in reasonable distance for her to be able to visit pt. Hilary also reports that pt has been calling her every day, with some days pleasant and then other days he is upset. Hilary reported that pt was upset one day because he was not allowed to have a cookie. Hilary endorses that pt needs LTC placement to manage his care.     1100  Pt came into LSW\"s office concerned as he doesn't know if he should have agreed to take his medications. LSW instructed pt that it is a good idea to take the medications to help him. Pt is worried about the side effects and wants print outs, LSW encouraged him to discuss with the doctor. Pt provided some of his writings which spoke of pt being hospitalized at 12 yrs old. When as about this pt stated that he was hospitalized for nightmares. Much of the pt's other writings were nonsensical content. Pt also made comment about his memory and that he can remember things from years ago. LSW instructed pt that sometimes people have troubles with their short-term memory.     Grace Murphy, MARGARITA JOHNSONW   "

## 2025-09-11 NOTE — PROGRESS NOTES
SOFIA received notification that the facility, [facility], completed Medicaid Questionnaire with pt's wife. Pt's wife is touring the facility tomorrow at 1pm. If she is agreeable to pt' admitting to the facility after completing tour, pt likely would be able to admit tomorrow. ALEXW faxed PASRR to facility and was instructed that no LOC would need to be submitted as pt will be Medicaid Pending and LOC is only for active Medicaid.     MARGARITA Grace

## 2025-09-11 NOTE — CARE PLAN
The patient's goals for the shift include listen to music    The clinical goals for the shift include maintain safety/rest    Over the shift, the patient did make progress toward the following goals. Barriers to progression include wear yellow safety socks. Recommendations to address these barriers include maintain safety.

## 2025-09-11 NOTE — GROUP NOTE
Group Topic: Coping Skills   Group Date: 9/11/2025  Start Time: 1000  End Time: 1100  Facilitators: DENVER Mcghee   Department: [facility] REHABTH VIRTUAL    Number of Participants: 15   Group Focus: other Coping Skill Cards  Treatment Modality: Other: Recreation Therapy  Interventions utilized were exploration, group exercise, patient education, problem solving, and support  Purpose: coping skills, maladaptive thinking, feelings, irrational fears, communication skills, insight or knowledge, self-worth, self-care, relapse prevention strategies, and trigger / craving management    Name: Ellis Pollack YOB: 1960   MR: [id number]      Facilitator: Recreational Therapist  Level of Participation: minimal  Quality of Participation: passive and quiet  Interactions with others: pt joins group with little encouragement.  Passive with participation.   Mood/Affect: passive  Triggers (if applicable): n/a  Cognition: passive  Progress: None  Comments: pt problem is delusional thought.   Plan: continue with services

## 2025-09-11 NOTE — PROGRESS NOTES
Ellis Pollack is a 64 y.o. male on day 37 of admission presenting with Atypical psychosis.      Subjective     The patient’s chart was reviewed and his case discussed during interdisciplinary team rounds this morning. Nursing staff report that he has been medication compliant overnight and this morning, with no issues observed. Staff note that he appears motivated to remain compliant.  During today's face-to-face interaction, the patient was calm, cooperative, and engaged in the interview. He reported that his night was “okay” and confirmed he was able to sleep without difficulty. He denied experiencing nightmares. When asked directly about hallucinations, he denied hearing voices or seeing things that others could not perceive. He correctly identified the number of people present in the room and showed good reality testing.  The patient denied any suicidal ideation or homicidal thoughts during the interview. He further denied experiencing any physical pain at this time. Affect appeared brighter and more congruent, particularly when discussing the possibility of discharge, and he verbalized feeling “good” when informed that discharge planning may occur as early as tomorrow, pending continued stability. Insight into the reason for hospitalization has improved; he acknowledged that he was admitted due to safety concerns when he was previously confused and at risk of accidental harm.  Psychoeducation was provided, emphasizing the importance of medication adherence for stabilization and relapse prevention. He verbalized understanding and agreement, and expressed motivation to continue taking medications consistently. I reinforced the need for compliance  moving forward.  Overall, the patient demonstrates clinical improvement with decreased psychotic symptoms, improved orientation, and no acute safety concerns voiced today.     Objective     Last Recorded Vitals  Blood pressure 117/75, pulse 80, temperature 36.6 °C  "(97.9 °F), temperature source Temporal, resp. rate 16, height 1.702 m (5' 7\"), weight 65.2 kg (143 lb 11.8 oz), SpO2 97%.         Sleep Log  Estimated \"Sleeping\" Durations   Night Est. Hours   08/28 7.25-7.75   08/29 8.50   08/30 9.00   08/31 8.25-8.75   09/01 9.50-10.00   09/02 6.00-6.75   09/03 7.75-8.25   09/04 9.50-10.25   09/05 6.00-7.75   09/06 8.00-8.50   09/07 8.75-9.00   09/08 7.75-8.00   09/09 9.50-10.00   09/10 8.00-8.50   09/11 0.00        Review of Systems    Psychiatric ROS - Adult  Anxiety: Denies current symptoms  Depression: denies current symptoms  Delirium: Negative  Psychosis: Denies AVH.  Elizabeth: Negative  Safety Issues: when asked, he denies SI, denies HI  Psychiatric ROS Comment: - as noted    Physical Exam      Mental Status Exam  General: appears older than stated age, disheveled  Appearance: wearing hospital attire   Attitude: calm, confused  Behavior: disorganized but cooperative  Motor Activity: No abnormal movements noted  Speech: reduced rate and volume, poorly enunciated at times   Mood: \"okay\"  Affect: constricted, confused at times  Thought Process:  disorganized   Thought Content: delusions, probable paranoia, when asked he denies SI/HI  Thought Perception: when asked he denies AH/VH  Cognition: alert and oriented to self, place, time, and president  Insight: possible baseline of limited - short term memory deficits, does not recall why he is admitted, extent of his illness  Judgement: possible baseline of poor to fair, patient is intermittently taking medications, nonagitated       Psychiatric Risk Assessment  Violence Risk Assessment: major mental illness, male, substance abuse, and other possible developing dementia  Acute Risk of Harm to Others is Considered: low  Suicide Risk Assessment: age > 65 yrs old, , and male  Protective Factors against Suicide: other denies SI/HI, denies past SA  Acute Risk of Harm to Self is Considered: moderate    Relevant Results  No results " found for this or any previous visit (from the past 96 hours).    Scheduled medications  Scheduled Medications[1]  Continuous medications  Continuous Medications[2]  PRN medications  PRN Medications[3]       Assessment & Plan  Atypical psychosis    Seizure disorder (Multi)    Major neurocognitive disorder (Multi)    Ellis Pollack is a 64 year old M with past psychiatric history of anxiety, psychosis, admitted to [facility] on 8/5/25 with psychotic symptoms.       Improvement in irritability and delusions, persisting with some thought disorganization, seemingly purposeless wandering behaviors.  Medication adherence fluctuating however improving.  Cognitive screening assessment likely indicative of major neurocognitive disorder. Admission continues, pending optimal disposition to the community.     Biological  - continue risperidone 1mg BID for psychosis/agitation/anxiety educated and encouraged to take the bedtime dose   - continue Depakote ER 750mg for mood stabilization     Valproic acid level 66 (09/03/25)  - prns available  - c/w daily thiamine and MVI  - continue intraoral tropicamide for sialorrhea of risperdal 1% 1 drop bid PRN swish and spit  - melatonin 3mg at 1800 for sleep/wake cycle regulation  - scheduled trazodone 25mg bedtime for sleep aide  - medical co-managing pertinences     Psychological  - Provider encourages patient to attend all groups.     Sociological  - Provider encourages patient to work with social work regarding safe discharge plan.          Nutrition/Malnutrition:  The diagnosis of malnutrition has significant impact on risk adjustment, mortality and readmission rates, length of stay and hospital reimbursement.  The below is a representation of nutrition status if evaluated.  If blank, there is no associated malnutrition finding to incorporate per the dietician team:         Total time spent with patient encounter 31 minutes. This includes patient interview, assessment, extensive  chart review, personal review of labs and images as noted above, and formulation of recommendations.     Rae Benitez PMHNP-BC  Psychiatry, [facility]  1* contact: Epic chat preferred                       [1] calcium carbonate, 1,250 mg of calcium carbonate, oral, Daily  divalproex, 750 mg, oral, Daily  melatonin, 3 mg, oral, Daily  multivitamin with minerals, 1 tablet, oral, Daily  risperiDONE, 1 mg, oral, BID  thiamine, 100 mg, oral, Daily  traZODone, 25 mg, oral, Nightly     [2]    [3] PRN medications: acetaminophen, alum-mag hydroxide-simeth, hydrOXYzine HCL, magnesium hydroxide, nicotine polacrilex, OLANZapine **OR** OLANZapine, tropicamide

## 2025-09-11 NOTE — GROUP NOTE
Group Topic: Other   Group Date: 9/11/2025  Start Time: 1330  End Time: 1430  Facilitators: DENVER Mcghee   Department: [facility] REHABTH VIRTUAL    Number of Participants: 12   Group Focus: other [employer] Game  Treatment Modality: Other: Recreation Therapy  Interventions utilized were mental fitness  Purpose: other: elevate mood, increase socialization, stimulate memory, enhance self esteem    Name: Ellis Pollack YOB: 1960   MR: [id number]      Facilitator: Recreational Therapist  Level of Participation: did not attend  Quality of Participation: did not attend  Interactions with others: did not attend  Mood/Affect: did not attend  Triggers (if applicable): n/a  Cognition: did not attend  Progress: None  Comments: pt problem is delusional thought.  Pt is resting in his room.  No handout to offer.  Plan: continue with services

## 2025-09-11 NOTE — CARE PLAN
The patient's goals for the shift include listen to music    The clinical goals for the shift include maintain safety/rest      Problem: Safety - Adult  Goal: Free from fall injury  Outcome: Progressing     Problem: Discharge Planning  Goal: Discharge to home or other facility with appropriate resources  Outcome: Progressing     Problem: Nutrition  Goal: Nutrient intake appropriate for maintaining nutritional needs  Outcome: Progressing

## 2025-09-11 NOTE — PROGRESS NOTES
ALEXW spoke with pt's wife Em regarding tour tomorrow and that pt can potentially discharge tomorrow after the tour if she is agreeable. Em reports that her mother passed away yesterday and they also have to go to the  home tomorrow to make arrangements. ALEXW will touch base with Em after tour to further discuss decision on if pt is able to admit to the facility. Em believes that she will be able to make a decision after the tour, despite the current situation with losing her mother.     Grace Murphy, MARGARITA BLACK

## 2025-09-11 NOTE — GROUP NOTE
Group Topic: Goals   Group Date: 9/11/2025  Start Time: 0730  End Time: 0800  Facilitators: Will Thomas   Department: [facility] Geriatric     Number of Participants: 19   Group Focus: goals  Treatment Modality: Individual Therapy  Interventions utilized were assignment  Purpose: feelings, self-worth, and self-care    Name: Ellis Pollack YOB: 1960   MR: [id number]      Facilitator: Mental Health PC[facility]  Level of Participation: active  Quality of Participation: appropriate/pleasant  Interactions with others: appropriate  Mood/Affect: appropriate  Cognition: coherent/clear  Progress: Moderate  Comments: patient was able to make progress towards treatment of atypical psychosis  Plan: continue with services

## 2025-09-12 NOTE — PROGRESS NOTES
LSW received notification that pt's wife has accepted [facility] in [place] for LTC placement. Transportation scheduled for 6pm per facilities request. LSW left a message for pt's wife Em and pt's Daughter Crystal to inform of pt's discharge and time.     Grace Murphy, MSW SOFIA

## 2025-09-12 NOTE — GROUP NOTE
Group Topic: Goals   Group Date: 9/12/2025  Start Time: 0730  End Time: 0800  Facilitators: Jyoti Foote   Department: [facility] Geriatric     Number of Participants: 14   Group Focus: goals  Treatment Modality: Other: Personal daily goal setting  Interventions utilized were assignment and other discussion  Purpose: other: Morning check in and goal setting    Name: Ellis Pollack YOB: 1960   MR: [id number]      Facilitator: Mental Health PCNA  Level of Participation: moderate  Quality of Participation: appropriate/pleasant, attentive, and cooperative  Interactions with others: appropriate  Mood/Affect: appropriate  Cognition: coherent/clear  Progress: Moderate  Comments: Patient problem is psychosis. Patient was active during group activity.  Plan: continue with services

## 2025-09-12 NOTE — NURSING NOTE
ALLY NOTE     Problem:  Paranoid     Behavior:  Pt is calm and no longer talking gibberish to himself     Group Participation: yes    Appetite/Meals: good       Interventions:  Pt calm and taking direction from staff, taking all scheduled medications        Plan:  Continue to monitor for paranoia

## 2025-09-12 NOTE — GROUP NOTE
"Group Topic: Self Esteem   Group Date: 9/12/2025  Start Time: 1000  End Time: 1050  Facilitators: DENVER Mcghee   Department: [facility] REHABTH VIRTUAL    Number of Participants: 15   Group Focus: other Self Esteem Group  Treatment Modality: Other: Recreation Therapy  Interventions utilized were exploration, group exercise, patient education, problem solving, and support  Purpose: coping skills, maladaptive thinking, feelings, irrational fears, communication skills, insight or knowledge, self-worth, self-care, relapse prevention strategies, and trigger / craving management    Name: Ellis Pollack YOB: 1960   MR: [id number]      Facilitator: Recreational Therapist  Level of Participation: minimal  Quality of Participation: pt joins group with little encouragement but before group begins, reports he is \"very tired\" and exits group to rest in his room.  No handout to offer.  Interactions with others: sleeping in his room  Mood/Affect: sleeping in his room  Triggers (if applicable): n/a  Cognition: sleeping in his room  Progress: Minimal  Comments: pt problem is delusional thought.  Plan: continue with services      "

## 2025-09-12 NOTE — PROGRESS NOTES
[facility]: [facility]  PROGRESS NOTE      Ellis Pollack is a 64 y.o. male that is being seen  today for follow up at [facility].  Subjective   Pt. Is being seen for follow up at [facility].  Patient has been stable.  Vital signs are stable.  Patient is a pleasantly confused.  Not able to provide adequate review of system.      ROS  Patient is pleasantly confused and not able to provide adequate review of system.  Vitals:    09/12/25 0827   BP: 106/69   Pulse: 81   Resp: 16   Temp: 37 °C (98.6 °F)   SpO2: 97%      Vitals:    08/13/25 1900   Weight: 65.2 kg (143 lb 11.8 oz)     Body mass index is 22.51 kg/m².  Physical Exam  Constitutional: Patient does not appear to be in any acute distress  Cardiovascular: RRR, S1/S2, no murmurs, rubs, or gallops, radial pulses +2, no edema of extremities  Pulmonary: CTAB, no respiratory distress.  Abdomen: +BS, soft, non-tender, nondistended, no guarding or rebound, no masses noted  MSK: No joint swelling, normal movements of all extremities. Range of motion- normal.  Skin- No lesions, contusions, or erythema.  Peripheral puslses palpable bilaterally 2+  Neuro: AAO X1-2, Cranial nerves 2-12 grossly intact,DTR 2+ in all 4 limbs       LABS   [unfilled]  Lab Results   Component Value Date    GLUCOSE 111 (H) 09/03/2025    CALCIUM 9.2 09/03/2025     09/03/2025    K 4.6 09/03/2025    CO2 30 09/03/2025     09/03/2025    BUN 23 09/03/2025    CREATININE 0.89 09/03/2025     Lab Results   Component Value Date    ALT 25 09/03/2025    AST 18 09/03/2025    ALKPHOS 46 09/03/2025    BILITOT 0.6 09/03/2025     Lab Results   Component Value Date    WBC 9.3 08/05/2025    HGB 14.8 08/05/2025    HCT 43.2 08/05/2025    MCV 94 08/05/2025     08/05/2025     Lab Results   Component Value Date    CHOL 162 09/12/2025     Lab Results   Component Value Date    HDL 56.7 09/12/2025     Lab Results   Component Value Date    LDLCALC 85 09/12/2025     Lab Results    Component Value Date    TRIG 103 09/12/2025     Lab Results   Component Value Date    HGBA1C 5.4 11/22/2021     Other labs not included in the list above were reviewed either before or during this encounter.    History    Medical History[1]  Surgical History[2]  Family History[3]  Allergies[4]  Medications Ordered Prior to Encounter[5]  Immunization History   Administered Date(s) Administered    Flu vaccine, trivalent, preservative free, HIGH-DOSE, age 65y+ (Fluzone) 09/02/2025    Moderna SARS-CoV-2 Vaccination 04/10/2021, 05/08/2021, 12/28/2021    Pfizer COVID-19 vaccine, 12 years and older, (30mcg/0.3mL) (Comirnaty) 12/06/2023, 09/29/2024    Pfizer COVID-19 vaccine, bivalent, age 12 years and older (30 mcg/0.3 mL) 12/20/2022    Tdap vaccine, age 7 year and older (BOOSTRIX, ADACEL) 02/22/2024     Patient's medical history was reviewed and updated either before or during this encounter.  ASSESSMENT / PLAN:  Active Hospital Problems    Major neurocognitive disorder (Multi)      Seizure disorder (Multi)      *Atypical psychosis       Patient is being seen for follow-up at [facility].  Patient has seizure disorder and is on Keppra.  Patient's vital signs are stable.  Patient is being seen by [facility] team for atypical psychosis.  Patient is pleasantly confused not able to provide adequate review of systems.      Pierce Murray MD                      [1] History reviewed. No pertinent past medical history.  [2] History reviewed. No pertinent surgical history.  [3] No family history on file.  [4] No Known Allergies  [5]   No current facility-administered medications on file prior to encounter.     Current Outpatient Medications on File Prior to Encounter   Medication Sig Dispense Refill    calcium carbonate 500 mg calcium (1,250 mg) chewable tablet Chew and swallow 1 tablet (500 mg of elemental calcium) once daily.      levETIRAcetam (Keppra) 500 mg tablet Take 1 tablet (500 mg) by mouth 2 times a day. 60 tablet  11    multivitamin tablet Take 1 tablet by mouth once daily.

## 2025-09-12 NOTE — DISCHARGE SUMMARY
"Admit Date: 8/6/2025   Discharge Date: 9/12/2025    Reason For Admission:   psychosis     Discharge Diagnosis  Major neurocognitive disorder (Multi)    Issues Requiring Follow-Up  Cognitive decline    Test Results Pending At Discharge  Pending Labs       Order Current Status    Extra Urine Gray Tube Collected (09/06/25 1723)    Hemoglobin A1c In process    Urinalysis with Reflex Culture In process            Hospital Course   The patient was admitted with acute psychosis characterized by paranoia, disorganized speech, and persecutory delusions. He was irritable but cooperative with staff and remained redirectable without significant aggression. He consistently denied suicidal or homicidal ideation during hospitalization but continued to demonstrate poor insight and impaired judgment. He engaged in basic care, consumed meals, and participated minimally in structured individual activities. Drooling were intermittently noted. Workup for possible underlying neurocognitive disorder was initiated (labs pending). Antipsychotic management was continued, with stabilization of agitation observed. Discharge planning complete ant the patient appears to be at baseline.No new issues or changes have been observed. The patient continues to work individually with the recreational therapist and has responded well to these sessions, which appear to brighten his affect and engagement. His demeanor remained pleasant calm and cooperative, and his thought processes remains disorganized.    Pertinent Physical Exam At Time of Discharge  Physical Exam    Mental Status Exam  General: appears older than stated age, disheveled  Appearance: wearing hospital attire   Attitude: calm, confused  Behavior: disorganized but cooperative  Motor Activity: No abnormal movements noted  Speech: reduced rate and volume, poorly enunciated at times   Mood: \"I am good\"  Affect: constricted, confused at times  Thought Process:  disorganized   Thought Content: " delusions, probable paranoia, when asked he denies SI/HI  Thought Perception: when asked he denies AH/VH  Cognition: alert and oriented to self, place, time, and president  Insight: possible baseline of limited - short term memory deficits, does not recall why he is admitted, extent of his illness  Judgement: possible baseline of poor to fair, patient is intermittently taking medications, nonagitated    Risk Assessment at Discharge:  Violence Risk Assessment: major mental illness, male, substance abuse, and other possible dementia   Acute Risk of Harm to Others is Considered: low   Risk Mitigated by: Supportive environment medication adherence,redirection   If Chronic Risk: due to limited insight and possible underlying dementia    Suicide Risk Assessment: age > 65 yrs old, , and male  Protective Factors against Suicide: other Denies SI or past hx of SA or self harm   Acute Risk of Harm to Self is Considered: low  Risk Mitigated by: Supportive environment medication adherence,redirection  If Chronic Risk: due to limited insight and possible underlying dementia      Outpatient Appointments/Follow-Ups  Future Appointments   Date Time Provider Department Center   3/6/2026 10:30 AM GUS Lenz [id number] [facility]  [address],   [place] [address]  Phone: [phone]  Go on 9/12/2025  Discharge to facility for ongoing assistance and support of physical and mental health needs.      MELVIN Faye-CNP  Psychiatry, [facility]  1* contact: [employer] preferred

## 2025-09-12 NOTE — GROUP NOTE
Group Topic: Music Therapy   Group Date: 9/12/2025  Start Time: 1100  End Time: 1200  Facilitators: Trinh Millard   Department: [facility] EXPRESSIVE THER VIRTUAL    Number of Participants: 12   Group Focus: communication/socialization, concentration, expressive outlet, feeling awareness/expression, rapport building, and self-esteem/task mastery  Treatment Modality: Music Therapy  Interventions Utilized were: active music engagement, education/instruction, empathic listening/validating emotions, group exercise, and sharing/discussion    Patients were engaged in active music making to promote goals of self-expression, feeling awareness, and socialization. Pts were given the opportunity to share musical preferences with peers and actively engage in group drumming.   Name: Ellis Pollack YOB: 1960   MR: [id number]      Level of Participation: did not attend  Progress: None  Plan: continue with services    Pt did not attend

## 2025-09-12 NOTE — GROUP NOTE
Group Topic: Art Creative   Group Date: 9/12/2025  Start Time: 1340  End Time: 1430  Facilitators: DENVER Mcghee   Department: [facility] REHABTH [facility]    Number of Participants: 12   Group Focus: other Open Art  Treatment Modality: Other: Recreation Therapy  Interventions utilized were exploration, reminiscence, and story telling  Purpose: feelings, self-worth, and relapse prevention strategies    Name: Ellis Pollack YOB: 1960   MR: [id number]      Facilitator: Recreational Therapist  Level of Participation: active  Quality of Participation: appropriate/pleasant, cooperative, and pt joins group with little encouragement.  Focuses easily on art task.  Pleasantly confused and appropriate with participation.   Interactions with others: appropriate  Mood/Affect: appropriate  Triggers (if applicable): n/a  Cognition: confused  Progress: Moderate  Comments: pt problem is delusional thought.   Plan: continue with services

## 2025-09-12 NOTE — NURSING NOTE
ALLY NOTE     Problem:  Delusions     Behavior:  Patient was quiet and calm during this shift. He was observed in the group room watching television and keeping to himself. He was pleasant and cooperative. No complaints made or noted.    Group Participation: Yes  Appetite/Meals: HS snacks provided       Interventions:  Administered scheduled medications    Response:  Compliant with care and medications     Plan:  Continue current plan of care

## 2025-09-12 NOTE — GROUP NOTE
Group Topic: Feeling Awareness/Expression   Group Date: 9/11/2025  Start Time: 2010  End Time: 2025  Facilitators: Meseret Yang   Department: [facility]    Number of Participants: 18   Group Focus: check in  Treatment Modality: Interpersonal Therapy  Interventions utilized were reality testing  Purpose: feelings    Name: Ellis Pollack YOB: 1960   MR: [id number]      Facilitator: Mental Health PCNA  Level of Participation: minimal  Quality of Participation: appropriate/pleasant  Interactions with others: appropriate  Mood/Affect: appropriate  Triggers (if applicable): none  Cognition: confused  Progress: Minimal  Comments: patient presented with atypical psychosis    Plan: continue with services

## 2025-09-12 NOTE — PROGRESS NOTES
"Ellis Pollack is a 64 y.o. male on day 38 of admission presenting with Atypical psychosis.      Subjective     Chart reviewed and the patient’s case was discussed with the interdisciplinary team. Per nursing report, the patient remains compliant with medications, slept well overnight, and is consuming meals appropriately. No new issues or changes have been observed. The patient continues to work individually with the recreational therapist and has responded well to these sessions, which appear to brighten his affect and engagement.  During today’s face-to-face encounter, the patient was observed resting in bed. When asked about his night, he reported that his sleep was good. Upon further discussion, he described his mood as “okay” and expressed happiness about residing in the facility at this time. He denied auditory or visual hallucinations, as well as suicidal or homicidal ideation. The patient was given the opportunity to ask questions and voice concerns, which were addressed appropriately. His demeanor remained pleasant calm and cooperative, and his thought processes remains disorganized.potential discharge by today    Objective     Last Recorded Vitals  Blood pressure 106/69, pulse 81, temperature 37 °C (98.6 °F), temperature source Temporal, resp. rate 16, height 1.702 m (5' 7\"), weight 65.2 kg (143 lb 11.8 oz), SpO2 97%.         Sleep Log  Estimated \"Sleeping\" Durations   Night Est. Hours   08/28 7.25-7.75   08/29 8.50   08/30 9.00   08/31 8.25-8.75   09/01 9.50-10.00   09/02 6.00-6.75   09/03 7.75-8.25   09/04 9.50-10.25   09/05 6.00-7.75   09/06 8.00-8.50   09/07 8.75-9.00   09/08 7.75-8.00   09/09 9.50-10.00   09/10 8.00-8.50   09/11 7.75-8.00        Review of Systems    Psychiatric ROS - Adult  Anxiety: Denies current symptoms  Depression: denies current symptoms  Delirium: Negative  Psychosis: Denies AVH.  Elizabeth: Negative  Safety Issues: when asked, he denies SI, denies HI  Psychiatric ROS Comment: - as " "noted    Physical Exam      Mental Status Exam  General: appears older than stated age, disheveled  Appearance: wearing hospital attire   Attitude: calm, confused  Behavior: disorganized but cooperative  Motor Activity: No abnormal movements noted  Speech: reduced rate and volume, poorly enunciated at times   Mood: \"I am good\"  Affect: constricted, confused at times  Thought Process:  disorganized   Thought Content: delusions, probable paranoia, when asked he denies SI/HI  Thought Perception: when asked he denies AH/VH  Cognition: alert and oriented to self, place, time, and president  Insight: possible baseline of limited - short term memory deficits, does not recall why he is admitted, extent of his illness  Judgement: possible baseline of poor to fair, patient is intermittently taking medications, nonagitated       Psychiatric Risk Assessment  Violence Risk Assessment: major mental illness, male, substance abuse, and other possible developing dementia  Acute Risk of Harm to Others is Considered: low  Suicide Risk Assessment: age > 65 yrs old, , and male  Protective Factors against Suicide: other denies SI/HI, denies past SA  Acute Risk of Harm to Self is Considered: moderate    Relevant Results  No results found for this or any previous visit (from the past 96 hours).    Scheduled medications  Scheduled Medications[1]  Continuous medications  Continuous Medications[2]  PRN medications  PRN Medications[3]       Assessment & Plan  Atypical psychosis    Seizure disorder (Multi)    Major neurocognitive disorder (Multi)    Ellis Pollack is a 64 year old M with past psychiatric history of anxiety, psychosis, admitted to [facility] on 8/5/25 with psychotic symptoms.       Improvement in irritability and delusions, persisting with some thought disorganization, seemingly purposeless wandering behaviors.  Medication adherence fluctuating however improving.  Cognitive screening assessment likely indicative of " major neurocognitive disorder. Admission continues, pending optimal disposition to the community.     Biological  - continue risperidone 1mg BID for psychosis/agitation/anxiety educated and encouraged to take the bedtime dose   - continue Depakote ER 750mg for mood stabilization     Valproic acid level 66 (09/03/25)  - prns available  - c/w daily thiamine and MVI  - continue intraoral tropicamide for sialorrhea of risperdal 1% 1 drop bid PRN swish and spit  - melatonin 3mg at 1800 for sleep/wake cycle regulation  - scheduled trazodone 25mg bedtime for sleep aide  - medical co-managing pertinences     Psychological  - Provider encourages patient to attend all groups.     Sociological  - Provider encourages patient to work with social work regarding safe discharge plan.          Nutrition/Malnutrition:  The diagnosis of malnutrition has significant impact on risk adjustment, mortality and readmission rates, length of stay and hospital reimbursement.  The below is a representation of nutrition status if evaluated.  If blank, there is no associated malnutrition finding to incorporate per the dietician team:         Total time spent with patient encounter 25 minutes. This includes patient interview, assessment, extensive chart review, personal review of labs and images as noted above, and formulation of recommendations.     Rae Benitez PMHNP-BC  Psychiatry, [facility]  1* contact: Epic chat preferred                         [1] calcium carbonate, 1,250 mg of calcium carbonate, oral, Daily  divalproex, 750 mg, oral, Daily  melatonin, 3 mg, oral, Daily  multivitamin with minerals, 1 tablet, oral, Daily  risperiDONE, 1 mg, oral, BID  thiamine, 100 mg, oral, Daily  traZODone, 25 mg, oral, Nightly     [2]    [3] PRN medications: acetaminophen, alum-mag hydroxide-simeth, hydrOXYzine HCL, magnesium hydroxide, nicotine polacrilex, OLANZapine **OR** OLANZapine, tropicamide

## 2025-09-12 NOTE — CARE PLAN
The patient's goals for the shift include listen to music    The clinical goals for the shift include discharge planning    Over the shift, the patient did make progress toward the following goals. Barriers to progression include. Recommendations to address these barriers include.